# Patient Record
Sex: FEMALE | Race: OTHER | HISPANIC OR LATINO
[De-identification: names, ages, dates, MRNs, and addresses within clinical notes are randomized per-mention and may not be internally consistent; named-entity substitution may affect disease eponyms.]

---

## 2017-05-15 ENCOUNTER — APPOINTMENT (OUTPATIENT)
Dept: OPHTHALMOLOGY | Facility: CLINIC | Age: 72
End: 2017-05-15

## 2017-11-16 ENCOUNTER — APPOINTMENT (OUTPATIENT)
Dept: OPHTHALMOLOGY | Facility: CLINIC | Age: 72
End: 2017-11-16
Payer: MEDICARE

## 2017-11-16 DIAGNOSIS — H25.12 AGE-RELATED NUCLEAR CATARACT, LEFT EYE: ICD-10-CM

## 2017-11-16 PROCEDURE — 92012 INTRM OPH EXAM EST PATIENT: CPT

## 2017-11-16 PROCEDURE — 92083 EXTENDED VISUAL FIELD XM: CPT

## 2017-11-16 PROCEDURE — 92020 GONIOSCOPY: CPT

## 2018-05-17 ENCOUNTER — APPOINTMENT (OUTPATIENT)
Dept: OPHTHALMOLOGY | Facility: CLINIC | Age: 73
End: 2018-05-17
Payer: MEDICARE

## 2018-05-17 PROCEDURE — 92020 GONIOSCOPY: CPT

## 2018-05-17 PROCEDURE — 92014 COMPRE OPH EXAM EST PT 1/>: CPT

## 2018-11-15 ENCOUNTER — APPOINTMENT (OUTPATIENT)
Dept: OPHTHALMOLOGY | Facility: CLINIC | Age: 73
End: 2018-11-15
Payer: MEDICARE

## 2018-11-15 DIAGNOSIS — H40.039 ANATOMICAL NARROW ANGLE, UNSPECIFIED EYE: ICD-10-CM

## 2018-11-15 PROCEDURE — 92133 CPTRZD OPH DX IMG PST SGM ON: CPT

## 2018-11-15 PROCEDURE — 92014 COMPRE OPH EXAM EST PT 1/>: CPT

## 2018-11-15 PROCEDURE — 92083 EXTENDED VISUAL FIELD XM: CPT

## 2018-12-28 ENCOUNTER — OUTPATIENT (OUTPATIENT)
Dept: OUTPATIENT SERVICES | Facility: HOSPITAL | Age: 73
LOS: 1 days | End: 2018-12-28

## 2018-12-28 VITALS
SYSTOLIC BLOOD PRESSURE: 100 MMHG | RESPIRATION RATE: 16 BRPM | TEMPERATURE: 98 F | DIASTOLIC BLOOD PRESSURE: 60 MMHG | HEART RATE: 78 BPM | OXYGEN SATURATION: 98 % | HEIGHT: 59 IN | WEIGHT: 128.09 LBS

## 2018-12-28 DIAGNOSIS — D49.0 NEOPLASM OF UNSPECIFIED BEHAVIOR OF DIGESTIVE SYSTEM: ICD-10-CM

## 2018-12-28 DIAGNOSIS — H54.7 UNSPECIFIED VISUAL LOSS: Chronic | ICD-10-CM

## 2018-12-28 DIAGNOSIS — Z90.49 ACQUIRED ABSENCE OF OTHER SPECIFIED PARTS OF DIGESTIVE TRACT: Chronic | ICD-10-CM

## 2018-12-28 DIAGNOSIS — K38.9 DISEASE OF APPENDIX, UNSPECIFIED: ICD-10-CM

## 2018-12-28 DIAGNOSIS — Z98.890 OTHER SPECIFIED POSTPROCEDURAL STATES: Chronic | ICD-10-CM

## 2018-12-28 DIAGNOSIS — H40.9 UNSPECIFIED GLAUCOMA: Chronic | ICD-10-CM

## 2018-12-28 RX ORDER — SODIUM CHLORIDE 9 MG/ML
1000 INJECTION, SOLUTION INTRAVENOUS
Qty: 0 | Refills: 0 | Status: DISCONTINUED | OUTPATIENT
Start: 2019-01-04 | End: 2019-01-19

## 2018-12-28 NOTE — H&P PST ADULT - NEGATIVE ENMT SYMPTOMS
no recurrent cold sores/no sinus symptoms/no hearing difficulty/no nose bleeds/no vertigo/no nasal congestion/no nasal discharge/no throat pain/no dysphagia/no ear pain/no tinnitus

## 2018-12-28 NOTE — H&P PST ADULT - NSANTHOSAYNRD_GEN_A_CORE
No. YESI screening performed.  STOP BANG Legend: 0-2 = LOW Risk; 3-4 = INTERMEDIATE Risk; 5-8 = HIGH Risk/never tested

## 2018-12-28 NOTE — H&P PST ADULT - RS GEN PE MLT RESP DETAILS PC
no rhonchi/clear to auscultation bilaterally/respirations non-labored/no intercostal retractions/no rales/airway patent

## 2018-12-28 NOTE — H&P PST ADULT - ASSESSMENT
73 yrs old female who had colonoscopy in Oct 2018 that showed enlarged and inflamed appendix as per the pt. Pt presents for preop eval to have laparoscopic appendectomy on 1/4/19.

## 2018-12-28 NOTE — H&P PST ADULT - HISTORY OF PRESENT ILLNESS
73 yrs old female who had colonoscopy in Oct 2018 that showed enlarged and inflamed appendix as per the pt. Pt then had MRI that showed ? mass. Pt presents for preop eval to have laparoscopic appendectomy on 1/4/19.

## 2018-12-28 NOTE — H&P PST ADULT - PSH
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

## 2018-12-28 NOTE — H&P PST ADULT - PMH
Appendix disease Appendix disease    Blindness  right eye, pt wears prostheis on her right eye  Glaucoma  left eye Appendix disease    Blindness  right eye, pt wears prosthesis on her right eye  Glaucoma  left eye

## 2018-12-28 NOTE — H&P PST ADULT - VISION (WITH CORRECTIVE LENSES IF THE PATIENT USUALLY WEARS THEM):
Normal vision: sees adequately in most situations; can see medication labels, newsprint/uses eye glasses Partially impaired: cannot see medication labels or newsprint, but can see obstacles in path, and the surrounding layout; can count fingers at arm's length/uses eye glasses, right eye prosthesis, pt is blind in her right

## 2018-12-28 NOTE — H&P PST ADULT - PROBLEM SELECTOR PLAN 1
Scheduled to have laparoscopic appendectomy on 1/4/19.   Labs and EKG done by PCP and copy in chart.   Preop instructions provided to pt.   Famotidine and chlorhexidine scrubs provided to pt.  Pt had medical clearance - will obtain copy.

## 2018-12-30 PROBLEM — H54.7 UNSPECIFIED VISUAL LOSS: Chronic | Status: ACTIVE | Noted: 2018-12-28

## 2019-01-03 ENCOUNTER — TRANSCRIPTION ENCOUNTER (OUTPATIENT)
Age: 74
End: 2019-01-03

## 2019-01-03 NOTE — ASU PATIENT PROFILE, ADULT - VISION (WITH CORRECTIVE LENSES IF THE PATIENT USUALLY WEARS THEM):
Partially impaired: cannot see medication labels or newsprint, but can see obstacles in path, and the surrounding layout; can count fingers at arm's length/uses eye glasses, right eye prosthesis, pt is blind in her right

## 2019-01-03 NOTE — ASU PATIENT PROFILE, ADULT - PSH
delivery delivered  many yrs ago with tubal ligation  H/O abdominoplasty  many yrs ago  H/O colonoscopy    History of cholecystectomy  15 yrs ago

## 2019-01-04 ENCOUNTER — OUTPATIENT (OUTPATIENT)
Dept: OUTPATIENT SERVICES | Facility: HOSPITAL | Age: 74
LOS: 1 days | Discharge: HOME | End: 2019-01-04
Payer: MEDICARE

## 2019-01-04 ENCOUNTER — RESULT REVIEW (OUTPATIENT)
Age: 74
End: 2019-01-04

## 2019-01-04 VITALS
DIASTOLIC BLOOD PRESSURE: 60 MMHG | HEART RATE: 78 BPM | OXYGEN SATURATION: 98 % | WEIGHT: 128.09 LBS | RESPIRATION RATE: 16 BRPM | HEIGHT: 59 IN | SYSTOLIC BLOOD PRESSURE: 100 MMHG | TEMPERATURE: 98 F

## 2019-01-04 VITALS
OXYGEN SATURATION: 100 % | DIASTOLIC BLOOD PRESSURE: 75 MMHG | HEART RATE: 80 BPM | RESPIRATION RATE: 14 BRPM | SYSTOLIC BLOOD PRESSURE: 133 MMHG

## 2019-01-04 DIAGNOSIS — Z98.890 OTHER SPECIFIED POSTPROCEDURAL STATES: Chronic | ICD-10-CM

## 2019-01-04 DIAGNOSIS — D49.0 NEOPLASM OF UNSPECIFIED BEHAVIOR OF DIGESTIVE SYSTEM: ICD-10-CM

## 2019-01-04 DIAGNOSIS — Z90.49 ACQUIRED ABSENCE OF OTHER SPECIFIED PARTS OF DIGESTIVE TRACT: Chronic | ICD-10-CM

## 2019-01-04 PROCEDURE — 88305 TISSUE EXAM BY PATHOLOGIST: CPT | Mod: 26

## 2019-01-04 PROCEDURE — 88304 TISSUE EXAM BY PATHOLOGIST: CPT | Mod: 26

## 2019-01-04 RX ORDER — IBUPROFEN 200 MG
1 TABLET ORAL
Qty: 0 | Refills: 0 | COMMUNITY

## 2019-01-04 RX ADMIN — SODIUM CHLORIDE 30 MILLILITER(S): 9 INJECTION, SOLUTION INTRAVENOUS at 15:53

## 2019-01-04 NOTE — ASU DISCHARGE PLAN (ADULT/PEDIATRIC). - SPECIAL INSTRUCTIONS
FOLLOW-UP:  1. Please call to make a follow-up appointment within one week of discharge with Dr. Mcgregor (See below for office information to call for an appointment)   ACTIVITY: No heavy lifting or straining. Otherwise, you may return to your usual level of physical activity. If you are taking narcotic pain medication (such as Percocet), do NOT drive a car, operate machinery or make important decisions.  DIET: Return to your usual diet.  WOUND CARE: steri strips will fall off on their own  BATHING: Please do not submerge wound underwater. You may shower and/or sponge bathe.

## 2019-01-04 NOTE — BRIEF OPERATIVE NOTE - OPERATION/FINDINGS
Laparoscopic appendectomy. Appendix engorged; base of appendix w/cecum taken via EndoGIA 45 purple load stapler.

## 2019-01-04 NOTE — ASU PREOP CHECKLIST - 1.
Patient's right eye is blind and small, and she wears a prosthetic to make it look normal.  She sleeps in it and Dr. Rust is aware.  She can leave it in.

## 2019-01-04 NOTE — ASU DISCHARGE PLAN (ADULT/PEDIATRIC). - NURSING INSTRUCTIONS
You were given 1000mg IV Tylenol for pain management.  Please DO NOT take Tylenol, Vicodin or Percocet for the next 6 hours (until 8:15 pm).  DO NOT EXCEED 3000MG OF TYLENOL OVER 24 HOURS.

## 2019-01-04 NOTE — BRIEF OPERATIVE NOTE - PROCEDURE
<<-----Click on this checkbox to enter Procedure Laparoscopic appendectomy  01/04/2019    Active  [facility]

## 2019-01-04 NOTE — ASU DISCHARGE PLAN (ADULT/PEDIATRIC). - MEDICATION SUMMARY - MEDICATIONS TO TAKE
I will START or STAY ON the medications listed below when I get home from the hospital:    Percocet 5/325 oral tablet  -- 1 tab(s) by mouth every 6 hours, As Needed MDD:4 tabs per day   -- Caution federal law prohibits the transfer of this drug to any person other  than the person for whom it was prescribed.  May cause drowsiness.  Alcohol may intensify this effect.  Use care when operating dangerous machinery.  This prescription cannot be refilled.  This product contains acetaminophen.  Do not use  with any other product containing acetaminophen to prevent possible liver damage.  Using more of this medication than prescribed may cause serious breathing problems.    -- Indication: For post-operative pain control    Calcitrate  -- 1 tab(s) by mouth once a day, am  -- Indication: For Nutritional supplementation    Tobradex 0.3%-0.1% ophthalmic suspension  -- 2 drop(s) to each affected eye 4 times a day, last dose 12/28/18  -- Indication: For opthalmic solution    Probiotic Formula oral capsule  -- 1 cap(s) by mouth once a day, am  -- Indication: For probiotic    Centrum oral tablet  -- 1 tab(s) by mouth once a day, last dose 12/28/18  -- Indication: For Nutritional supplementation

## 2019-01-16 LAB — SURGICAL PATHOLOGY STUDY: SIGNIFICANT CHANGE UP

## 2019-01-23 PROBLEM — H40.9 UNSPECIFIED GLAUCOMA: Chronic | Status: ACTIVE | Noted: 2018-12-28

## 2019-01-29 ENCOUNTER — APPOINTMENT (OUTPATIENT)
Dept: COLORECTAL SURGERY | Facility: CLINIC | Age: 74
End: 2019-01-29

## 2019-02-08 ENCOUNTER — OUTPATIENT (OUTPATIENT)
Dept: OUTPATIENT SERVICES | Facility: HOSPITAL | Age: 74
LOS: 1 days | End: 2019-02-08
Payer: MEDICARE

## 2019-02-08 VITALS
WEIGHT: 125 LBS | HEIGHT: 59.5 IN | HEART RATE: 72 BPM | OXYGEN SATURATION: 99 % | TEMPERATURE: 98 F | RESPIRATION RATE: 16 BRPM | SYSTOLIC BLOOD PRESSURE: 120 MMHG | DIASTOLIC BLOOD PRESSURE: 78 MMHG

## 2019-02-08 DIAGNOSIS — Z90.49 ACQUIRED ABSENCE OF OTHER SPECIFIED PARTS OF DIGESTIVE TRACT: Chronic | ICD-10-CM

## 2019-02-08 DIAGNOSIS — Z98.890 OTHER SPECIFIED POSTPROCEDURAL STATES: Chronic | ICD-10-CM

## 2019-02-08 DIAGNOSIS — C18.1 MALIGNANT NEOPLASM OF APPENDIX: ICD-10-CM

## 2019-02-08 LAB
ALBUMIN SERPL ELPH-MCNC: 4.4 G/DL — SIGNIFICANT CHANGE UP (ref 3.3–5)
ALP SERPL-CCNC: 81 U/L — SIGNIFICANT CHANGE UP (ref 40–120)
ALT FLD-CCNC: 15 U/L — SIGNIFICANT CHANGE UP (ref 4–33)
ANION GAP SERPL CALC-SCNC: 13 MMO/L — SIGNIFICANT CHANGE UP (ref 7–14)
AST SERPL-CCNC: 20 U/L — SIGNIFICANT CHANGE UP (ref 4–32)
BILIRUB SERPL-MCNC: 0.5 MG/DL — SIGNIFICANT CHANGE UP (ref 0.2–1.2)
BLD GP AB SCN SERPL QL: NEGATIVE — SIGNIFICANT CHANGE UP
BUN SERPL-MCNC: 9 MG/DL — SIGNIFICANT CHANGE UP (ref 7–23)
CALCIUM SERPL-MCNC: 10.4 MG/DL — SIGNIFICANT CHANGE UP (ref 8.4–10.5)
CHLORIDE SERPL-SCNC: 101 MMOL/L — SIGNIFICANT CHANGE UP (ref 98–107)
CO2 SERPL-SCNC: 26 MMOL/L — SIGNIFICANT CHANGE UP (ref 22–31)
CREAT SERPL-MCNC: 0.68 MG/DL — SIGNIFICANT CHANGE UP (ref 0.5–1.3)
GLUCOSE SERPL-MCNC: 76 MG/DL — SIGNIFICANT CHANGE UP (ref 70–99)
HBA1C BLD-MCNC: 4.5 % — SIGNIFICANT CHANGE UP (ref 4–5.6)
HCT VFR BLD CALC: 39.2 % — SIGNIFICANT CHANGE UP (ref 34.5–45)
HGB BLD-MCNC: 12.9 G/DL — SIGNIFICANT CHANGE UP (ref 11.5–15.5)
MCHC RBC-ENTMCNC: 30.6 PG — SIGNIFICANT CHANGE UP (ref 27–34)
MCHC RBC-ENTMCNC: 32.9 % — SIGNIFICANT CHANGE UP (ref 32–36)
MCV RBC AUTO: 93.1 FL — SIGNIFICANT CHANGE UP (ref 80–100)
NRBC # FLD: 0 K/UL — LOW (ref 25–125)
PLATELET # BLD AUTO: 265 K/UL — SIGNIFICANT CHANGE UP (ref 150–400)
PMV BLD: 11.2 FL — SIGNIFICANT CHANGE UP (ref 7–13)
POTASSIUM SERPL-MCNC: 3.9 MMOL/L — SIGNIFICANT CHANGE UP (ref 3.5–5.3)
POTASSIUM SERPL-SCNC: 3.9 MMOL/L — SIGNIFICANT CHANGE UP (ref 3.5–5.3)
PROT SERPL-MCNC: 7.7 G/DL — SIGNIFICANT CHANGE UP (ref 6–8.3)
RBC # BLD: 4.21 M/UL — SIGNIFICANT CHANGE UP (ref 3.8–5.2)
RBC # FLD: 12 % — SIGNIFICANT CHANGE UP (ref 10.3–14.5)
RH IG SCN BLD-IMP: POSITIVE — SIGNIFICANT CHANGE UP
SODIUM SERPL-SCNC: 140 MMOL/L — SIGNIFICANT CHANGE UP (ref 135–145)
WBC # BLD: 7 K/UL — SIGNIFICANT CHANGE UP (ref 3.8–10.5)
WBC # FLD AUTO: 7 K/UL — SIGNIFICANT CHANGE UP (ref 3.8–10.5)

## 2019-02-08 PROCEDURE — 93010 ELECTROCARDIOGRAM REPORT: CPT

## 2019-02-08 RX ORDER — MULTIVIT-MIN/FERROUS GLUCONATE 9 MG/15 ML
1 LIQUID (ML) ORAL
Qty: 0 | Refills: 0 | COMMUNITY

## 2019-02-08 RX ORDER — TOBRAMYCIN AND DEXAMETHASONE 1; 3 MG/ML; MG/ML
2 SUSPENSION/ DROPS OPHTHALMIC
Qty: 0 | Refills: 0 | COMMUNITY

## 2019-02-08 RX ORDER — L.ACIDOPH/B.ANIMALIS/B.LONGUM 15B CELL
1 CAPSULE ORAL
Qty: 0 | Refills: 0 | COMMUNITY

## 2019-02-08 NOTE — H&P PST ADULT - NEUROLOGICAL DETAILS
responds to verbal commands/cranial nerves intact/normal strength/alert and oriented x 3/responds to pain/sensation intact

## 2019-02-08 NOTE — H&P PST ADULT - NEGATIVE ENMT SYMPTOMS
no tinnitus/no hearing difficulty/no throat pain/no dysphagia/no sinus symptoms/no vertigo/no nasal congestion/no ear pain/no nose bleeds

## 2019-02-08 NOTE — H&P PST ADULT - PMH
Appendix disease    Arthritis    Blindness  right eye, pt wears prosthesis on her right eye  Glaucoma  left eye  Malignant neoplasm of appendix

## 2019-02-08 NOTE — H&P PST ADULT - VISION (WITH CORRECTIVE LENSES IF THE PATIENT USUALLY WEARS THEM):
uses eye glasses, right eye prosthesis, pt is blind in her right eye/Partially impaired: cannot see medication labels or newsprint, but can see obstacles in path, and the surrounding layout; can count fingers at arm's length

## 2019-02-08 NOTE — H&P PST ADULT - NEGATIVE GASTROINTESTINAL SYMPTOMS
no constipation/no abdominal pain/no melena/no diarrhea/no nausea/no vomiting/no change in bowel habits

## 2019-02-08 NOTE — H&P PST ADULT - RS GEN PE MLT RESP DETAILS PC
airway patent/no rales/breath sounds equal/respirations non-labored/clear to auscultation bilaterally/no rhonchi/no wheezes

## 2019-02-08 NOTE — H&P PST ADULT - PROBLEM SELECTOR PLAN 1
Pt is scheduled for laparoscopic right hemicolectomy for 2/22/19. Pre-op instructions provided. Pepcid provided for GI prophylaxis. Chlorhexidine soap provided for skin prep. Pt stated understanding.     Pending medical evaluation due to advanced age and planned major surgery. Instructed patient to make an appt.

## 2019-02-08 NOTE — H&P PST ADULT - NEGATIVE NEUROLOGICAL SYMPTOMS
no difficulty walking/no weakness/no generalized seizures/no focal seizures/no vertigo/no headache/no transient paralysis/no tremors/no paresthesias/no syncope

## 2019-02-08 NOTE — H&P PST ADULT - HISTORY OF PRESENT ILLNESS
74 year old female presents to presurgical testing with diagnosis of malignant neoplasm of appendix scheduled for laparoscopic right hemicolectomy for 2/22/19. Pt is s/p laparoscopic appendectomy on 1/4/19, pathology positive for carcinoma. Pt denies abdominal pain, had two episodes of diarrhea after eating oatmeal, but otherwise, no changes in bowel habits.

## 2019-02-08 NOTE — H&P PST ADULT - PSH
delivery delivered  many yrs ago with tubal ligation  H/O abdominoplasty  many yrs ago  H/O colonoscopy    History of cholecystectomy  15 yrs ago  S/P laparoscopic appendectomy  19

## 2019-02-08 NOTE — H&P PST ADULT - NEGATIVE CARDIOVASCULAR SYMPTOMS
no chest pain/no paroxysmal nocturnal dyspnea/no dyspnea on exertion/no peripheral edema/no palpitations

## 2019-03-04 ENCOUNTER — TRANSCRIPTION ENCOUNTER (OUTPATIENT)
Age: 74
End: 2019-03-04

## 2019-03-05 ENCOUNTER — INPATIENT (INPATIENT)
Facility: HOSPITAL | Age: 74
LOS: 9 days | Discharge: HOME | End: 2019-03-15
Attending: SPECIALIST | Admitting: SPECIALIST
Payer: MEDICARE

## 2019-03-05 ENCOUNTER — RESULT REVIEW (OUTPATIENT)
Age: 74
End: 2019-03-05

## 2019-03-05 VITALS
HEIGHT: 59.5 IN | TEMPERATURE: 98 F | SYSTOLIC BLOOD PRESSURE: 124 MMHG | OXYGEN SATURATION: 98 % | DIASTOLIC BLOOD PRESSURE: 62 MMHG | HEART RATE: 77 BPM | RESPIRATION RATE: 16 BRPM | WEIGHT: 125 LBS

## 2019-03-05 DIAGNOSIS — Z90.49 ACQUIRED ABSENCE OF OTHER SPECIFIED PARTS OF DIGESTIVE TRACT: Chronic | ICD-10-CM

## 2019-03-05 DIAGNOSIS — Z98.890 OTHER SPECIFIED POSTPROCEDURAL STATES: Chronic | ICD-10-CM

## 2019-03-05 DIAGNOSIS — C18.1 MALIGNANT NEOPLASM OF APPENDIX: ICD-10-CM

## 2019-03-05 LAB
ANION GAP SERPL CALC-SCNC: 15 MMO/L — HIGH (ref 7–14)
BLD GP AB SCN SERPL QL: NEGATIVE — SIGNIFICANT CHANGE UP
BUN SERPL-MCNC: 7 MG/DL — SIGNIFICANT CHANGE UP (ref 7–23)
CALCIUM SERPL-MCNC: 8.5 MG/DL — SIGNIFICANT CHANGE UP (ref 8.4–10.5)
CHLORIDE SERPL-SCNC: 101 MMOL/L — SIGNIFICANT CHANGE UP (ref 98–107)
CO2 SERPL-SCNC: 23 MMOL/L — SIGNIFICANT CHANGE UP (ref 22–31)
CREAT SERPL-MCNC: 0.66 MG/DL — SIGNIFICANT CHANGE UP (ref 0.5–1.3)
GLUCOSE BLDC GLUCOMTR-MCNC: 92 MG/DL — SIGNIFICANT CHANGE UP (ref 70–99)
GLUCOSE SERPL-MCNC: 129 MG/DL — HIGH (ref 70–99)
HCT VFR BLD CALC: 35.5 % — SIGNIFICANT CHANGE UP (ref 34.5–45)
HGB BLD-MCNC: 11.6 G/DL — SIGNIFICANT CHANGE UP (ref 11.5–15.5)
MAGNESIUM SERPL-MCNC: 1.3 MG/DL — LOW (ref 1.6–2.6)
MCHC RBC-ENTMCNC: 30.2 PG — SIGNIFICANT CHANGE UP (ref 27–34)
MCHC RBC-ENTMCNC: 32.7 % — SIGNIFICANT CHANGE UP (ref 32–36)
MCV RBC AUTO: 92.4 FL — SIGNIFICANT CHANGE UP (ref 80–100)
NRBC # FLD: 0 K/UL — LOW (ref 25–125)
PHOSPHATE SERPL-MCNC: 3.4 MG/DL — SIGNIFICANT CHANGE UP (ref 2.5–4.5)
PLATELET # BLD AUTO: 210 K/UL — SIGNIFICANT CHANGE UP (ref 150–400)
PMV BLD: 10.1 FL — SIGNIFICANT CHANGE UP (ref 7–13)
POTASSIUM SERPL-MCNC: 3.7 MMOL/L — SIGNIFICANT CHANGE UP (ref 3.5–5.3)
POTASSIUM SERPL-SCNC: 3.7 MMOL/L — SIGNIFICANT CHANGE UP (ref 3.5–5.3)
RBC # BLD: 3.84 M/UL — SIGNIFICANT CHANGE UP (ref 3.8–5.2)
RBC # FLD: 11.9 % — SIGNIFICANT CHANGE UP (ref 10.3–14.5)
RH IG SCN BLD-IMP: POSITIVE — SIGNIFICANT CHANGE UP
SODIUM SERPL-SCNC: 139 MMOL/L — SIGNIFICANT CHANGE UP (ref 135–145)
WBC # BLD: 10.81 K/UL — HIGH (ref 3.8–10.5)
WBC # FLD AUTO: 10.81 K/UL — HIGH (ref 3.8–10.5)

## 2019-03-05 PROCEDURE — 88309 TISSUE EXAM BY PATHOLOGIST: CPT | Mod: 26

## 2019-03-05 RX ORDER — ACETAMINOPHEN 500 MG
1000 TABLET ORAL ONCE
Qty: 0 | Refills: 0 | Status: DISCONTINUED | OUTPATIENT
Start: 2019-03-05 | End: 2019-03-05

## 2019-03-05 RX ORDER — HYDROMORPHONE HYDROCHLORIDE 2 MG/ML
0.5 INJECTION INTRAMUSCULAR; INTRAVENOUS; SUBCUTANEOUS
Qty: 0 | Refills: 0 | Status: DISCONTINUED | OUTPATIENT
Start: 2019-03-05 | End: 2019-03-06

## 2019-03-05 RX ORDER — ONDANSETRON 8 MG/1
4 TABLET, FILM COATED ORAL EVERY 6 HOURS
Qty: 0 | Refills: 0 | Status: DISCONTINUED | OUTPATIENT
Start: 2019-03-05 | End: 2019-03-06

## 2019-03-05 RX ORDER — SODIUM CHLORIDE 9 MG/ML
1000 INJECTION, SOLUTION INTRAVENOUS
Qty: 0 | Refills: 0 | Status: DISCONTINUED | OUTPATIENT
Start: 2019-03-05 | End: 2019-03-06

## 2019-03-05 RX ORDER — ACETAMINOPHEN 500 MG
1000 TABLET ORAL ONCE
Qty: 0 | Refills: 0 | Status: COMPLETED | OUTPATIENT
Start: 2019-03-06 | End: 2019-03-06

## 2019-03-05 RX ORDER — MAGNESIUM SULFATE 500 MG/ML
2 VIAL (ML) INJECTION ONCE
Qty: 0 | Refills: 0 | Status: COMPLETED | OUTPATIENT
Start: 2019-03-05 | End: 2019-03-05

## 2019-03-05 RX ORDER — NALOXONE HYDROCHLORIDE 4 MG/.1ML
0.1 SPRAY NASAL
Qty: 0 | Refills: 0 | Status: DISCONTINUED | OUTPATIENT
Start: 2019-03-05 | End: 2019-03-06

## 2019-03-05 RX ORDER — ENOXAPARIN SODIUM 100 MG/ML
40 INJECTION SUBCUTANEOUS DAILY
Qty: 0 | Refills: 0 | Status: DISCONTINUED | OUTPATIENT
Start: 2019-03-05 | End: 2019-03-15

## 2019-03-05 RX ORDER — ONDANSETRON 8 MG/1
4 TABLET, FILM COATED ORAL ONCE
Qty: 0 | Refills: 0 | Status: DISCONTINUED | OUTPATIENT
Start: 2019-03-05 | End: 2019-03-05

## 2019-03-05 RX ORDER — SODIUM CHLORIDE 9 MG/ML
1000 INJECTION, SOLUTION INTRAVENOUS
Qty: 0 | Refills: 0 | Status: DISCONTINUED | OUTPATIENT
Start: 2019-03-05 | End: 2019-03-05

## 2019-03-05 RX ORDER — HYDROMORPHONE HYDROCHLORIDE 2 MG/ML
30 INJECTION INTRAMUSCULAR; INTRAVENOUS; SUBCUTANEOUS
Qty: 0 | Refills: 0 | Status: DISCONTINUED | OUTPATIENT
Start: 2019-03-05 | End: 2019-03-06

## 2019-03-05 RX ORDER — HYDROMORPHONE HYDROCHLORIDE 2 MG/ML
0.5 INJECTION INTRAMUSCULAR; INTRAVENOUS; SUBCUTANEOUS
Qty: 0 | Refills: 0 | Status: DISCONTINUED | OUTPATIENT
Start: 2019-03-05 | End: 2019-03-05

## 2019-03-05 RX ORDER — ACETAMINOPHEN 500 MG
1000 TABLET ORAL ONCE
Qty: 0 | Refills: 0 | Status: COMPLETED | OUTPATIENT
Start: 2019-03-05 | End: 2019-03-05

## 2019-03-05 RX ORDER — DIPHENHYDRAMINE HCL 50 MG
12.5 CAPSULE ORAL EVERY 4 HOURS
Qty: 0 | Refills: 0 | Status: DISCONTINUED | OUTPATIENT
Start: 2019-03-05 | End: 2019-03-06

## 2019-03-05 RX ADMIN — HYDROMORPHONE HYDROCHLORIDE 30 MILLILITER(S): 2 INJECTION INTRAMUSCULAR; INTRAVENOUS; SUBCUTANEOUS at 16:00

## 2019-03-05 RX ADMIN — ENOXAPARIN SODIUM 40 MILLIGRAM(S): 100 INJECTION SUBCUTANEOUS at 23:53

## 2019-03-05 RX ADMIN — HYDROMORPHONE HYDROCHLORIDE 30 MILLILITER(S): 2 INJECTION INTRAMUSCULAR; INTRAVENOUS; SUBCUTANEOUS at 20:26

## 2019-03-05 RX ADMIN — Medication 50 GRAM(S): at 16:42

## 2019-03-05 RX ADMIN — Medication 400 MILLIGRAM(S): at 23:57

## 2019-03-05 NOTE — CHART NOTE - NSCHARTNOTEFT_GEN_A_CORE
B TEAM SURGERY POST-OPERATIVE NOTE    Subjective:  Patient is s/p laparoscopic right hemicolectomy for appendicular mucocele. Patient tolerated the procedure well and was transferred to PACU and then the floor without any issues. She is feeling well and denies any abdominal pain at this time, she has pressed the PCA pump button once. She has been tolerating sips of water with out any nausea or vomiting. She denies chest pain, lightheadedness or SOB. She has not been out of bed.     Objective:  Vital Signs Last 24 Hrs  T(C): 36.5 (05 Mar 2019 17:57), Max: 36.7 (05 Mar 2019 09:36)  T(F): 97.7 (05 Mar 2019 17:57), Max: 98.1 (05 Mar 2019 09:36)  HR: 88 (05 Mar 2019 17:57) (65 - 93)  BP: 130/70 (05 Mar 2019 17:57) (96/53 - 138/64)  BP(mean): 67 (05 Mar 2019 17:00) (61 - 97)  RR: 16 (05 Mar 2019 17:57) (8 - 16)  SpO2: 96% (05 Mar 2019 17:57) (96% - 100%)  I&O's Detail    05 Mar 2019 07:01  -  05 Mar 2019 19:02  --------------------------------------------------------  IN:    IV PiggyBack: 50 mL    lactated ringers.: 250 mL    Oral Fluid: 30 mL  Total IN: 330 mL    OUT:    Indwelling Catheter - Urethral: 575 mL  Total OUT: 575 mL    Total NET: -245 mL    enoxaparin Injectable 40    Physical Exam:  General: NAD, resting comfortably in bed  Pulmonary: Nonlabored breathing, no respiratory distress on RA  Cardiovascular: pulse is regular  Abdominal: soft, mildly distended, nontender throughout abdomen, even around incisions, midline incision dressing in place with scant s/s staining, lap incision dressings x4 c/d/i  Extremities: WWP  : cortes in place draining clear urine      LABS:                        11.6   10.81 )-----------( 210      ( 05 Mar 2019 14:55 )             35.5     03-05    139  |  101  |  7   ----------------------------<  129<H>  3.7   |  23  |  0.66    Ca    8.5      05 Mar 2019 14:55  Phos  3.4     03-05  Mg     1.3     03-05    CAPILLARY BLOOD GLUCOSE    POCT Blood Glucose.: 92 mg/dL (05 Mar 2019 10:02)    Assessment:  74y Female, now several hours post-op from a laparoscopic right hemicolectomy, recovering well.    Plan:  - CLD with IVF  - Pain control as needed  - LVX for DVT ppx  - OOB and ambulating as tolerated  - F/u AM labs  - Continue cortes until tomorrow

## 2019-03-05 NOTE — BRIEF OPERATIVE NOTE - OPERATION/FINDINGS
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

## 2019-03-05 NOTE — BRIEF OPERATIVE NOTE - PROCEDURE
<<-----Click on this checkbox to enter Procedure Right hemicolectomy  03/05/2019  laparoscopic assisted  Active  [id number]

## 2019-03-06 DIAGNOSIS — Z98.890 OTHER SPECIFIED POSTPROCEDURAL STATES: ICD-10-CM

## 2019-03-06 LAB
ANION GAP SERPL CALC-SCNC: 13 MMO/L — SIGNIFICANT CHANGE UP (ref 7–14)
BUN SERPL-MCNC: 6 MG/DL — LOW (ref 7–23)
CALCIUM SERPL-MCNC: 8.8 MG/DL — SIGNIFICANT CHANGE UP (ref 8.4–10.5)
CHLORIDE SERPL-SCNC: 101 MMOL/L — SIGNIFICANT CHANGE UP (ref 98–107)
CO2 SERPL-SCNC: 23 MMOL/L — SIGNIFICANT CHANGE UP (ref 22–31)
CREAT SERPL-MCNC: 0.59 MG/DL — SIGNIFICANT CHANGE UP (ref 0.5–1.3)
GLUCOSE SERPL-MCNC: 118 MG/DL — HIGH (ref 70–99)
HCT VFR BLD CALC: 37 % — SIGNIFICANT CHANGE UP (ref 34.5–45)
HGB BLD-MCNC: 12.1 G/DL — SIGNIFICANT CHANGE UP (ref 11.5–15.5)
MAGNESIUM SERPL-MCNC: 1.8 MG/DL — SIGNIFICANT CHANGE UP (ref 1.6–2.6)
MCHC RBC-ENTMCNC: 30.1 PG — SIGNIFICANT CHANGE UP (ref 27–34)
MCHC RBC-ENTMCNC: 32.7 % — SIGNIFICANT CHANGE UP (ref 32–36)
MCV RBC AUTO: 92 FL — SIGNIFICANT CHANGE UP (ref 80–100)
NRBC # FLD: 0 K/UL — LOW (ref 25–125)
PHOSPHATE SERPL-MCNC: 3.3 MG/DL — SIGNIFICANT CHANGE UP (ref 2.5–4.5)
PLATELET # BLD AUTO: 248 K/UL — SIGNIFICANT CHANGE UP (ref 150–400)
PMV BLD: 10.7 FL — SIGNIFICANT CHANGE UP (ref 7–13)
POTASSIUM SERPL-MCNC: 4 MMOL/L — SIGNIFICANT CHANGE UP (ref 3.5–5.3)
POTASSIUM SERPL-SCNC: 4 MMOL/L — SIGNIFICANT CHANGE UP (ref 3.5–5.3)
RBC # BLD: 4.02 M/UL — SIGNIFICANT CHANGE UP (ref 3.8–5.2)
RBC # FLD: 11.8 % — SIGNIFICANT CHANGE UP (ref 10.3–14.5)
SODIUM SERPL-SCNC: 137 MMOL/L — SIGNIFICANT CHANGE UP (ref 135–145)
WBC # BLD: 17.27 K/UL — HIGH (ref 3.8–10.5)
WBC # FLD AUTO: 17.27 K/UL — HIGH (ref 3.8–10.5)

## 2019-03-06 PROCEDURE — 93010 ELECTROCARDIOGRAM REPORT: CPT

## 2019-03-06 RX ORDER — SODIUM CHLORIDE 9 MG/ML
1000 INJECTION, SOLUTION INTRAVENOUS
Qty: 0 | Refills: 0 | Status: DISCONTINUED | OUTPATIENT
Start: 2019-03-06 | End: 2019-03-07

## 2019-03-06 RX ORDER — OXYCODONE HYDROCHLORIDE 5 MG/1
5 TABLET ORAL
Qty: 0 | Refills: 0 | Status: DISCONTINUED | OUTPATIENT
Start: 2019-03-06 | End: 2019-03-08

## 2019-03-06 RX ORDER — OXYCODONE HYDROCHLORIDE 5 MG/1
10 TABLET ORAL
Qty: 0 | Refills: 0 | Status: DISCONTINUED | OUTPATIENT
Start: 2019-03-06 | End: 2019-03-08

## 2019-03-06 RX ORDER — ACETAMINOPHEN 500 MG
650 TABLET ORAL EVERY 6 HOURS
Qty: 0 | Refills: 0 | Status: DISCONTINUED | OUTPATIENT
Start: 2019-03-06 | End: 2019-03-08

## 2019-03-06 RX ORDER — MAGNESIUM SULFATE 500 MG/ML
1 VIAL (ML) INJECTION ONCE
Qty: 0 | Refills: 0 | Status: COMPLETED | OUTPATIENT
Start: 2019-03-06 | End: 2019-03-06

## 2019-03-06 RX ADMIN — ENOXAPARIN SODIUM 40 MILLIGRAM(S): 100 INJECTION SUBCUTANEOUS at 11:31

## 2019-03-06 RX ADMIN — HYDROMORPHONE HYDROCHLORIDE 30 MILLILITER(S): 2 INJECTION INTRAMUSCULAR; INTRAVENOUS; SUBCUTANEOUS at 08:33

## 2019-03-06 RX ADMIN — Medication 400 MILLIGRAM(S): at 07:27

## 2019-03-06 RX ADMIN — Medication 650 MILLIGRAM(S): at 19:35

## 2019-03-06 RX ADMIN — Medication 400 MILLIGRAM(S): at 11:30

## 2019-03-06 RX ADMIN — Medication 100 GRAM(S): at 19:35

## 2019-03-06 RX ADMIN — OXYCODONE HYDROCHLORIDE 5 MILLIGRAM(S): 5 TABLET ORAL at 16:45

## 2019-03-06 NOTE — PROGRESS NOTE ADULT - SUBJECTIVE AND OBJECTIVE BOX
POD#1      Subjective: I feel well. I walked today.  I had some liquid to drink. I passed some felicity and some mucus from my rectum'    Objective:   Vital Signs Last 24 Hrs  T(C): 37.1 (06 Mar 2019 21:08), Max: 37.1 (06 Mar 2019 21:08)  T(F): 98.8 (06 Mar 2019 21:08), Max: 98.8 (06 Mar 2019 21:08)  HR: 95 (06 Mar 2019 21:08) (81 - 104)  BP: 114/63 (06 Mar 2019 21:08) (114/63 - 126/72)  BP(mean): --  RR: 18 (06 Mar 2019 21:08) (18 - 18)  SpO2: 94% (06 Mar 2019 21:08) (92% - 100%)    Daily     Daily     Heent: N/C, AT PER no scleral icterus, No JVD  Pul: clear  Cor: RRR  Abdomen: soft, non distended, normal BS. Wound - clean  Extremities: without edema, motor/sensory intact    I&O's Detail    05 Mar 2019 07:01  -  06 Mar 2019 07:00  --------------------------------------------------------  IN:    IV PiggyBack: 50 mL    lactated ringers.: 550 mL    Oral Fluid: 30 mL  Total IN: 630 mL    OUT:    Indwelling Catheter - Urethral: 1175 mL  Total OUT: 1175 mL    Total NET: -545 mL      06 Mar 2019 07:01  -  06 Mar 2019 22:11  --------------------------------------------------------  IN:    dextrose 5% + sodium chloride 0.45%.: 550 mL    Oral Fluid: 536 mL  Total IN: 1086 mL    OUT:    Voided: 1900 mL  Total OUT: 1900 mL    Total NET: -814 mL          MEDICATIONS  (STANDING):  acetaminophen   Tablet .. 650 milliGRAM(s) Oral every 6 hours  dextrose 5% + sodium chloride 0.45%. 1000 milliLiter(s) (50 mL/Hr) IV Continuous <Continuous>  enoxaparin Injectable 40 milliGRAM(s) SubCutaneous daily    MEDICATIONS  (PRN):  oxyCODONE    IR 5 milliGRAM(s) Oral every 3 hours PRN Moderate Pain (4 - 6)  oxyCODONE    IR 10 milliGRAM(s) Oral every 3 hours PRN Severe Pain (7 - 10)                            12.1   17.27 )-----------( 248      ( 06 Mar 2019 05:45 )             37.0     03-06    137  |  101  |  6<L>  ----------------------------<  118<H>  4.0   |  23  |  0.59    Ca    8.8      06 Mar 2019 05:45  Phos  3.3     03-06  Mg     1.8     03-06          Radiologic Studies:

## 2019-03-06 NOTE — PROGRESS NOTE ADULT - SUBJECTIVE AND OBJECTIVE BOX
A Team Surgery    SUBJECTIVE: Pt seen and examined at bedside.  Pt doing well.  Fritz small amount of water, on CLD.  Denies N/V.  +/- GI function.  Pain control adequate.  No acute complaints at this time.    OBJECTIVE: T(C): 36.9 (03-06-19 @ 10:35), Max: 36.9 (03-06-19 @ 10:35)  HR: 83 (03-06-19 @ 10:35) (65 - 93)  BP: 120/63 (03-06-19 @ 10:35) (96/53 - 138/64)  RR: 18 (03-06-19 @ 10:35) (8 - 18)  SpO2: 95% (03-06-19 @ 10:35) (94% - 100%)  Wt(kg): --  I&O's Summary    05 Mar 2019 07:01  -  06 Mar 2019 07:00  --------------------------------------------------------  IN: 630 mL / OUT: 1175 mL / NET: -545 mL      I&O's Detail    05 Mar 2019 07:01  -  06 Mar 2019 07:00  --------------------------------------------------------  IN:    IV PiggyBack: 50 mL    lactated ringers.: 550 mL    Oral Fluid: 30 mL  Total IN: 630 mL    OUT:    Indwelling Catheter - Urethral: 1175 mL  Total OUT: 1175 mL    Total NET: -545 mL        General: NAD  Abdomen: soft, NT, ND, dressing c/d/i    MEDICATIONS  (STANDING):  acetaminophen   Tablet .. 650 milliGRAM(s) Oral every 6 hours  acetaminophen  IVPB .. 1000 milliGRAM(s) IV Intermittent once  enoxaparin Injectable 40 milliGRAM(s) SubCutaneous daily  lactated ringers. 1000 milliLiter(s) (100 mL/Hr) IV Continuous <Continuous>  magnesium sulfate  IVPB 1 Gram(s) IV Intermittent once    MEDICATIONS  (PRN):  oxyCODONE    IR 5 milliGRAM(s) Oral every 3 hours PRN Moderate Pain (4 - 6)  oxyCODONE    IR 10 milliGRAM(s) Oral every 3 hours PRN Severe Pain (7 - 10)      LABS:                        12.1   17.27 )-----------( 248      ( 06 Mar 2019 05:45 )             37.0     03-06    137  |  101  |  6<L>  ----------------------------<  118<H>  4.0   |  23  |  0.59    Ca    8.8      06 Mar 2019 05:45  Phos  3.3     03-06  Mg     1.8     03-06            RADIOLOGY & ADDITIONAL STUDIES:

## 2019-03-06 NOTE — PROGRESS NOTE ADULT - SUBJECTIVE AND OBJECTIVE BOX
ANESTHESIA POSTOP CHECK    74y Female POSTOP DAY 1 S/P     Vital Signs Last 24 Hrs  T(C): 36.9 (06 Mar 2019 10:35), Max: 36.9 (06 Mar 2019 10:35)  T(F): 98.4 (06 Mar 2019 10:35), Max: 98.4 (06 Mar 2019 10:35)  HR: 83 (06 Mar 2019 10:35) (68 - 93)  BP: 120/63 (06 Mar 2019 10:35) (96/53 - 130/70)  BP(mean): 67 (05 Mar 2019 17:00) (64 - 67)  RR: 18 (06 Mar 2019 10:35) (10 - 18)  SpO2: 95% (06 Mar 2019 10:35) (94% - 100%)  I&O's Summary    05 Mar 2019 07:01  -  06 Mar 2019 07:00  --------------------------------------------------------  IN: 630 mL / OUT: 1175 mL / NET: -545 mL    06 Mar 2019 07:01  -  06 Mar 2019 16:21  --------------------------------------------------------  IN: 518 mL / OUT: 400 mL / NET: 118 mL        [X ] NO APPARENT ANESTHESIA COMPLICATIONS      Comments:

## 2019-03-06 NOTE — PROGRESS NOTE ADULT - SUBJECTIVE AND OBJECTIVE BOX
Anesthesia Pain Management Service    SUBJECTIVE: Pt now off IV PCA without problems reported.    Therapy:	  [ X] IV PCA	   [ ] Epidural           [ ] s/p Spinal Opoid              [ ] Postpartum infusion	  [ ] Patient controlled regional anesthesia (PCRA)    [ ] prn Analgesics    Allergies    No Known Allergies    Intolerances      MEDICATIONS  (STANDING):  acetaminophen   Tablet .. 650 milliGRAM(s) Oral every 6 hours  dextrose 5% + sodium chloride 0.45%. 1000 milliLiter(s) (50 mL/Hr) IV Continuous <Continuous>  enoxaparin Injectable 40 milliGRAM(s) SubCutaneous daily  magnesium sulfate  IVPB 1 Gram(s) IV Intermittent once    MEDICATIONS  (PRN):  oxyCODONE    IR 5 milliGRAM(s) Oral every 3 hours PRN Moderate Pain (4 - 6)  oxyCODONE    IR 10 milliGRAM(s) Oral every 3 hours PRN Severe Pain (7 - 10)      OBJECTIVE:   [X] No new signs     [ ] Other:    Side Effects:  [X ] None			[ ] Other:    Assessment of Catheter Site:		[ ] Intact		[ ] Other:    ASSESSMENT/PLAN  [ ] Continue current therapy    [X ] Therapy changed to:    [ ] IV PCA       [ ] Epidural     [ X] prn Analgesics     Comments: PRN Oral/IV opioids and/or non-opioid adjuvant analgesics to be used at this point.    Progress Note written now but Patient was seen earlier.

## 2019-03-06 NOTE — PROGRESS NOTE ADULT - ASSESSMENT
Assessment/Plan: 74yFemale s/p laparoscopic assisted right hemicolectomy  -PRN pain control with IV tylenol and PRN oxy  -IS/OOB/amb, PT  -cont CLD, f/u further GI function  -trend WBC  -remove cortes-->DTV in 8hrs  -strict I&O  -VTE ppx

## 2019-03-06 NOTE — PROGRESS NOTE ADULT - SUBJECTIVE AND OBJECTIVE BOX
Anesthesia Pain Management Service    SUBJECTIVE: Patient is doing well with IV PCA and no significant problems reported.    Pain Scale Score	At rest: _2__ 	With Activity: ___ 	[X ] Refer to charted pain scores    THERAPY:    [ ] IV PCA Morphine		[ ] 5 mg/mL	[ ] 1 mg/mL  [X ] IV PCA Hydromorphone	[ ] 5 mg/mL	[X ] 1 mg/mL  [ ] IV PCA Fentanyl		[ ] 50 micrograms/mL    Demand dose __0.2_ lockout __6_ (minutes) Continuous Rate _0__ Total: __1.2_   mg used (in past 24 hrs)      MEDICATIONS  (STANDING):  acetaminophen   Tablet .. 650 milliGRAM(s) Oral every 6 hours  acetaminophen  IVPB .. 1000 milliGRAM(s) IV Intermittent once  enoxaparin Injectable 40 milliGRAM(s) SubCutaneous daily  lactated ringers. 1000 milliLiter(s) (100 mL/Hr) IV Continuous <Continuous>  magnesium sulfate  IVPB 1 Gram(s) IV Intermittent once    MEDICATIONS  (PRN):  oxyCODONE    IR 5 milliGRAM(s) Oral every 3 hours PRN Moderate Pain (4 - 6)  oxyCODONE    IR 10 milliGRAM(s) Oral every 3 hours PRN Severe Pain (7 - 10)      OBJECTIVE: sitting in chair     Sedation Score:	[ X] Alert	[ ] Drowsy 	[ ] Arousable	[ ] Asleep	[ ] Unresponsive    Side Effects:	[X ] None	[ ] Nausea	[ ] Vomiting	[ ] Pruritus  		[ ] Other:    Vital Signs Last 24 Hrs  T(C): 36.9 (06 Mar 2019 10:35), Max: 36.9 (06 Mar 2019 10:35)  T(F): 98.4 (06 Mar 2019 10:35), Max: 98.4 (06 Mar 2019 10:35)  HR: 83 (06 Mar 2019 10:35) (65 - 93)  BP: 120/63 (06 Mar 2019 10:35) (96/53 - 138/64)  BP(mean): 67 (05 Mar 2019 17:00) (61 - 97)  RR: 18 (06 Mar 2019 10:35) (8 - 18)  SpO2: 95% (06 Mar 2019 10:35) (94% - 100%)    ASSESSMENT/ PLAN    Therapy to  be:	[ ] Continue   [ X] Discontinued   [X ] Change to prn Analgesics    Documentation and Verification of current medications:   [X] Done	[ ] Not done, not elligible    Comments: PRN Oral/IV opioids and/or Adjuvant medication to be ordered at this point.

## 2019-03-07 DIAGNOSIS — M19.90 UNSPECIFIED OSTEOARTHRITIS, UNSPECIFIED SITE: ICD-10-CM

## 2019-03-07 LAB
ANION GAP SERPL CALC-SCNC: 12 MMO/L — SIGNIFICANT CHANGE UP (ref 7–14)
BUN SERPL-MCNC: 3 MG/DL — LOW (ref 7–23)
CALCIUM SERPL-MCNC: 8.7 MG/DL — SIGNIFICANT CHANGE UP (ref 8.4–10.5)
CHLORIDE SERPL-SCNC: 102 MMOL/L — SIGNIFICANT CHANGE UP (ref 98–107)
CO2 SERPL-SCNC: 25 MMOL/L — SIGNIFICANT CHANGE UP (ref 22–31)
CREAT SERPL-MCNC: 0.54 MG/DL — SIGNIFICANT CHANGE UP (ref 0.5–1.3)
GLUCOSE SERPL-MCNC: 113 MG/DL — HIGH (ref 70–99)
HCT VFR BLD CALC: 35.7 % — SIGNIFICANT CHANGE UP (ref 34.5–45)
HGB BLD-MCNC: 11.6 G/DL — SIGNIFICANT CHANGE UP (ref 11.5–15.5)
MAGNESIUM SERPL-MCNC: 1.8 MG/DL — SIGNIFICANT CHANGE UP (ref 1.6–2.6)
MCHC RBC-ENTMCNC: 30.4 PG — SIGNIFICANT CHANGE UP (ref 27–34)
MCHC RBC-ENTMCNC: 32.5 % — SIGNIFICANT CHANGE UP (ref 32–36)
MCV RBC AUTO: 93.7 FL — SIGNIFICANT CHANGE UP (ref 80–100)
NRBC # FLD: 0 K/UL — LOW (ref 25–125)
PHOSPHATE SERPL-MCNC: 1.2 MG/DL — LOW (ref 2.5–4.5)
PLATELET # BLD AUTO: 238 K/UL — SIGNIFICANT CHANGE UP (ref 150–400)
PMV BLD: 11.3 FL — SIGNIFICANT CHANGE UP (ref 7–13)
POTASSIUM SERPL-MCNC: 3.4 MMOL/L — LOW (ref 3.5–5.3)
POTASSIUM SERPL-SCNC: 3.4 MMOL/L — LOW (ref 3.5–5.3)
RBC # BLD: 3.81 M/UL — SIGNIFICANT CHANGE UP (ref 3.8–5.2)
RBC # FLD: 12.1 % — SIGNIFICANT CHANGE UP (ref 10.3–14.5)
SODIUM SERPL-SCNC: 139 MMOL/L — SIGNIFICANT CHANGE UP (ref 135–145)
WBC # BLD: 11.93 K/UL — HIGH (ref 3.8–10.5)
WBC # FLD AUTO: 11.93 K/UL — HIGH (ref 3.8–10.5)

## 2019-03-07 RX ORDER — ONDANSETRON 8 MG/1
4 TABLET, FILM COATED ORAL ONCE
Qty: 0 | Refills: 0 | Status: COMPLETED | OUTPATIENT
Start: 2019-03-07 | End: 2019-03-07

## 2019-03-07 RX ORDER — DIPHENHYDRAMINE HCL 50 MG
50 CAPSULE ORAL ONCE
Qty: 0 | Refills: 0 | Status: COMPLETED | OUTPATIENT
Start: 2019-03-07 | End: 2019-03-07

## 2019-03-07 RX ORDER — POTASSIUM PHOSPHATE, MONOBASIC POTASSIUM PHOSPHATE, DIBASIC 236; 224 MG/ML; MG/ML
15 INJECTION, SOLUTION INTRAVENOUS ONCE
Qty: 0 | Refills: 0 | Status: COMPLETED | OUTPATIENT
Start: 2019-03-07 | End: 2019-03-07

## 2019-03-07 RX ADMIN — Medication 650 MILLIGRAM(S): at 17:36

## 2019-03-07 RX ADMIN — ENOXAPARIN SODIUM 40 MILLIGRAM(S): 100 INJECTION SUBCUTANEOUS at 11:45

## 2019-03-07 RX ADMIN — POTASSIUM PHOSPHATE, MONOBASIC POTASSIUM PHOSPHATE, DIBASIC 62.5 MILLIMOLE(S): 236; 224 INJECTION, SOLUTION INTRAVENOUS at 08:56

## 2019-03-07 RX ADMIN — Medication 650 MILLIGRAM(S): at 12:15

## 2019-03-07 RX ADMIN — Medication 650 MILLIGRAM(S): at 07:13

## 2019-03-07 RX ADMIN — Medication 650 MILLIGRAM(S): at 11:46

## 2019-03-07 RX ADMIN — Medication 650 MILLIGRAM(S): at 00:52

## 2019-03-07 RX ADMIN — ONDANSETRON 4 MILLIGRAM(S): 8 TABLET, FILM COATED ORAL at 07:13

## 2019-03-07 RX ADMIN — Medication 650 MILLIGRAM(S): at 01:30

## 2019-03-07 RX ADMIN — ONDANSETRON 4 MILLIGRAM(S): 8 TABLET, FILM COATED ORAL at 23:18

## 2019-03-07 NOTE — PROGRESS NOTE ADULT - SUBJECTIVE AND OBJECTIVE BOX
POD# 2      Subjective: I was feeling nauseated yesterday. I am still passing flatus.    Objective:   Vital Signs Last 24 Hrs  T(C): 37.3 (07 Mar 2019 10:44), Max: 37.3 (07 Mar 2019 10:44)  T(F): 99.1 (07 Mar 2019 10:44), Max: 99.1 (07 Mar 2019 10:44)  HR: 85 (07 Mar 2019 10:44) (84 - 104)  BP: 147/71 (07 Mar 2019 10:44) (114/63 - 148/80)  BP(mean): --  RR: 18 (07 Mar 2019 10:44) (16 - 18)  SpO2: 96% (07 Mar 2019 10:44) (92% - 100%)    Daily     Daily     Heent: N/C, AT PER no scleral icterus, No JVD  Pul: clear  Cor: RRR  Abdomen: soft, normal BS. Wound - clean  Extremities: without edema, motor/sensory intact    I&O's Detail    06 Mar 2019 07:01  -  07 Mar 2019 07:00  --------------------------------------------------------  IN:    dextrose 5% + sodium chloride 0.45%.: 750 mL    Oral Fluid: 536 mL  Total IN: 1286 mL    OUT:    Voided: 2700 mL  Total OUT: 2700 mL    Total NET: -1414 mL          MEDICATIONS  (STANDING):  acetaminophen   Tablet .. 650 milliGRAM(s) Oral every 6 hours  enoxaparin Injectable 40 milliGRAM(s) SubCutaneous daily    MEDICATIONS  (PRN):  oxyCODONE    IR 5 milliGRAM(s) Oral every 3 hours PRN Moderate Pain (4 - 6)  oxyCODONE    IR 10 milliGRAM(s) Oral every 3 hours PRN Severe Pain (7 - 10)                            11.6   11.93 )-----------( 238      ( 07 Mar 2019 06:15 )             35.7     03-07    139  |  102  |  3<L>  ----------------------------<  113<H>  3.4<L>   |  25  |  0.54    Ca    8.7      07 Mar 2019 06:00  Phos  1.2     03-07  Mg     1.8     03-07          Radiologic Studies:

## 2019-03-07 NOTE — PROGRESS NOTE ADULT - SUBJECTIVE AND OBJECTIVE BOX
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 Patient states she urinated multiple times overnight and was unable to sleep, she denies dysuria. Patient passing gas no bm    Vital Signs Last 24 Hrs  T(C): 36.4 (07 Mar 2019 05:50), Max: 37.1 (06 Mar 2019 21:08)  T(F): 97.5 (07 Mar 2019 05:50), Max: 98.8 (06 Mar 2019 21:08)  HR: 84 (07 Mar 2019 01:40) (81 - 104)  BP: 148/80 (07 Mar 2019 05:50) (114/63 - 148/80)  BP(mean): --  RR: 16 (07 Mar 2019 05:50) (16 - 18)  SpO2: 93% (07 Mar 2019 05:50) (92% - 100%)I&O's Detail    05 Mar 2019 07:01  -  06 Mar 2019 07:00  --------------------------------------------------------  IN:    IV PiggyBack: 50 mL    lactated ringers.: 550 mL    Oral Fluid: 30 mL  Total IN: 630 mL    OUT:    Indwelling Catheter - Urethral: 1175 mL  Total OUT: 1175 mL    Total NET: -545 mL      06 Mar 2019 07:01  -  07 Mar 2019 06:52  --------------------------------------------------------  IN:    dextrose 5% + sodium chloride 0.45%.: 750 mL    Oral Fluid: 536 mL  Total IN: 1286 mL    OUT:    Voided: 2500 mL  Total OUT: 2500 mL    Total NET: -1214 mL      MEDICATIONS  (STANDING):  acetaminophen   Tablet .. 650 milliGRAM(s) Oral every 6 hours  enoxaparin Injectable 40 milliGRAM(s) SubCutaneous daily  ondansetron Injectable 4 milliGRAM(s) IV Push once    MEDICATIONS  (PRN):  oxyCODONE    IR 5 milliGRAM(s) Oral every 3 hours PRN Moderate Pain (4 - 6)  oxyCODONE    IR 10 milliGRAM(s) Oral every 3 hours PRN Severe Pain (7 - 10)      Physical Exam  Constitutional: A&Ox3, NAD  Respiratory: nonlabored breathing  Gastrointestinal: soft appropriately tender, incision c/d/i    LABS:                        12.1   17.27 )-----------( 248      ( 06 Mar 2019 05:45 )             37.0     03-06    137  |  101  |  6<L>  ----------------------------<  118<H>  4.0   |  23  |  0.59    Ca    8.8      06 Mar 2019 05:45  Phos  3.3     03-06  Mg     1.8     03-06

## 2019-03-07 NOTE — PROVIDER CONTACT NOTE (OTHER) - SITUATION
Pt. is complaining of incisional pain; standing tylenol is not due at this time; provider said ok to give tylenol early

## 2019-03-07 NOTE — PROGRESS NOTE ADULT - ASSESSMENT
74yFemale s/p laparoscopic assisted right hemicolectomy with nausea this am  -PRN pain control with IV tylenol and PRN oxy  -Zofran for nausea  -IV lock  -IS/OOB/amb, PT  -cont CLD, f/u further GI function  -trend WBC  -strict I&O  -VTE ppx  -Seen and d/w A team

## 2019-03-08 LAB
ANION GAP SERPL CALC-SCNC: 12 MMO/L — SIGNIFICANT CHANGE UP (ref 7–14)
BUN SERPL-MCNC: 6 MG/DL — LOW (ref 7–23)
CALCIUM SERPL-MCNC: 9.7 MG/DL — SIGNIFICANT CHANGE UP (ref 8.4–10.5)
CHLORIDE SERPL-SCNC: 100 MMOL/L — SIGNIFICANT CHANGE UP (ref 98–107)
CO2 SERPL-SCNC: 28 MMOL/L — SIGNIFICANT CHANGE UP (ref 22–31)
CREAT SERPL-MCNC: 0.6 MG/DL — SIGNIFICANT CHANGE UP (ref 0.5–1.3)
GLUCOSE SERPL-MCNC: 115 MG/DL — HIGH (ref 70–99)
HCT VFR BLD CALC: 38.6 % — SIGNIFICANT CHANGE UP (ref 34.5–45)
HGB BLD-MCNC: 12.5 G/DL — SIGNIFICANT CHANGE UP (ref 11.5–15.5)
MAGNESIUM SERPL-MCNC: 1.9 MG/DL — SIGNIFICANT CHANGE UP (ref 1.6–2.6)
MCHC RBC-ENTMCNC: 30.5 PG — SIGNIFICANT CHANGE UP (ref 27–34)
MCHC RBC-ENTMCNC: 32.4 % — SIGNIFICANT CHANGE UP (ref 32–36)
MCV RBC AUTO: 94.1 FL — SIGNIFICANT CHANGE UP (ref 80–100)
NRBC # FLD: 0 K/UL — LOW (ref 25–125)
PHOSPHATE SERPL-MCNC: 3.2 MG/DL — SIGNIFICANT CHANGE UP (ref 2.5–4.5)
PLATELET # BLD AUTO: 290 K/UL — SIGNIFICANT CHANGE UP (ref 150–400)
PMV BLD: 10.5 FL — SIGNIFICANT CHANGE UP (ref 7–13)
POTASSIUM SERPL-MCNC: 3.7 MMOL/L — SIGNIFICANT CHANGE UP (ref 3.5–5.3)
POTASSIUM SERPL-SCNC: 3.7 MMOL/L — SIGNIFICANT CHANGE UP (ref 3.5–5.3)
RBC # BLD: 4.1 M/UL — SIGNIFICANT CHANGE UP (ref 3.8–5.2)
RBC # FLD: 11.7 % — SIGNIFICANT CHANGE UP (ref 10.3–14.5)
SODIUM SERPL-SCNC: 140 MMOL/L — SIGNIFICANT CHANGE UP (ref 135–145)
WBC # BLD: 11.26 K/UL — HIGH (ref 3.8–10.5)
WBC # FLD AUTO: 11.26 K/UL — HIGH (ref 3.8–10.5)

## 2019-03-08 PROCEDURE — 71045 X-RAY EXAM CHEST 1 VIEW: CPT | Mod: 26

## 2019-03-08 PROCEDURE — 74018 RADEX ABDOMEN 1 VIEW: CPT | Mod: 26

## 2019-03-08 RX ORDER — ACETAMINOPHEN 500 MG
1000 TABLET ORAL ONCE
Qty: 0 | Refills: 0 | Status: COMPLETED | OUTPATIENT
Start: 2019-03-08 | End: 2019-03-08

## 2019-03-08 RX ORDER — BENZOCAINE AND MENTHOL 5; 1 G/100ML; G/100ML
1 LIQUID ORAL EVERY 6 HOURS
Qty: 0 | Refills: 0 | Status: DISCONTINUED | OUTPATIENT
Start: 2019-03-08 | End: 2019-03-11

## 2019-03-08 RX ORDER — HYDROMORPHONE HYDROCHLORIDE 2 MG/ML
1 INJECTION INTRAMUSCULAR; INTRAVENOUS; SUBCUTANEOUS EVERY 6 HOURS
Qty: 0 | Refills: 0 | Status: DISCONTINUED | OUTPATIENT
Start: 2019-03-08 | End: 2019-03-13

## 2019-03-08 RX ORDER — ONDANSETRON 8 MG/1
8 TABLET, FILM COATED ORAL ONCE
Qty: 0 | Refills: 0 | Status: COMPLETED | OUTPATIENT
Start: 2019-03-08 | End: 2019-03-08

## 2019-03-08 RX ORDER — HYDROMORPHONE HYDROCHLORIDE 2 MG/ML
0.5 INJECTION INTRAMUSCULAR; INTRAVENOUS; SUBCUTANEOUS EVERY 6 HOURS
Qty: 0 | Refills: 0 | Status: DISCONTINUED | OUTPATIENT
Start: 2019-03-08 | End: 2019-03-13

## 2019-03-08 RX ORDER — DEXTROSE MONOHYDRATE, SODIUM CHLORIDE, AND POTASSIUM CHLORIDE 50; .745; 4.5 G/1000ML; G/1000ML; G/1000ML
1000 INJECTION, SOLUTION INTRAVENOUS
Qty: 0 | Refills: 0 | Status: DISCONTINUED | OUTPATIENT
Start: 2019-03-08 | End: 2019-03-10

## 2019-03-08 RX ORDER — SODIUM CHLORIDE 9 MG/ML
500 INJECTION INTRAMUSCULAR; INTRAVENOUS; SUBCUTANEOUS ONCE
Qty: 0 | Refills: 0 | Status: COMPLETED | OUTPATIENT
Start: 2019-03-08 | End: 2019-03-08

## 2019-03-08 RX ORDER — HYDROMORPHONE HYDROCHLORIDE 2 MG/ML
1 INJECTION INTRAMUSCULAR; INTRAVENOUS; SUBCUTANEOUS EVERY 6 HOURS
Qty: 0 | Refills: 0 | Status: DISCONTINUED | OUTPATIENT
Start: 2019-03-08 | End: 2019-03-08

## 2019-03-08 RX ADMIN — ENOXAPARIN SODIUM 40 MILLIGRAM(S): 100 INJECTION SUBCUTANEOUS at 15:06

## 2019-03-08 RX ADMIN — Medication 650 MILLIGRAM(S): at 07:04

## 2019-03-08 RX ADMIN — Medication 50 MILLIGRAM(S): at 01:39

## 2019-03-08 RX ADMIN — Medication 400 MILLIGRAM(S): at 21:38

## 2019-03-08 RX ADMIN — Medication 400 MILLIGRAM(S): at 15:06

## 2019-03-08 RX ADMIN — Medication 1000 MILLIGRAM(S): at 22:08

## 2019-03-08 RX ADMIN — ONDANSETRON 8 MILLIGRAM(S): 8 TABLET, FILM COATED ORAL at 02:31

## 2019-03-08 RX ADMIN — SODIUM CHLORIDE 500 MILLILITER(S): 9 INJECTION INTRAMUSCULAR; INTRAVENOUS; SUBCUTANEOUS at 08:49

## 2019-03-08 RX ADMIN — Medication 1000 MILLIGRAM(S): at 15:30

## 2019-03-08 RX ADMIN — Medication 650 MILLIGRAM(S): at 01:32

## 2019-03-08 RX ADMIN — DEXTROSE MONOHYDRATE, SODIUM CHLORIDE, AND POTASSIUM CHLORIDE 75 MILLILITER(S): 50; .745; 4.5 INJECTION, SOLUTION INTRAVENOUS at 09:39

## 2019-03-08 RX ADMIN — BENZOCAINE AND MENTHOL 1 LOZENGE: 5; 1 LIQUID ORAL at 16:24

## 2019-03-08 NOTE — PROGRESS NOTE ADULT - ASSESSMENT
74yFemale s/p laparoscopic assisted right hemicolectomy with nausea this am  -PRN pain control with IV tylenol and PRN oxy  -Zofran for nausea  -AM bolus IV NS  -IS/OOB/amb, PT  -cont CLD, f/u further GI function  -trend WBC  -strict I&O  -VTE ppx    [id number]

## 2019-03-08 NOTE — PROGRESS NOTE ADULT - SUBJECTIVE AND OBJECTIVE BOX
POD # 3        Subjective:  I vomited a large amount this morning after feeling full and nauseated most of the day yesterday.  I have passed some liquid stools as well as some flatus.    Objective:   Vital Signs Last 24 Hrs  T(C): 37.2 (08 Mar 2019 10:43), Max: 37.2 (07 Mar 2019 14:37)  T(F): 99 (08 Mar 2019 10:43), Max: 99 (07 Mar 2019 14:37)  HR: 100 (08 Mar 2019 10:43) (82 - 100)  BP: 117/80 (08 Mar 2019 10:43) (117/80 - 147/88)  BP(mean): --  RR: 18 (08 Mar 2019 10:43) (18 - 20)  SpO2: 96% (08 Mar 2019 10:43) (93% - 98%)    Daily     Daily Weight in k.3 (08 Mar 2019 01:24)    Heent: N/C, AT PER no scleral icterus, No JVD  Pul: clear  Cor: RRR  Abdomen: soft, non distended, non tender, decreased BS. Wound - clean  Extremities: without edema, motor/sensory intact    I&O's Detail    07 Mar 2019 07:01  -  08 Mar 2019 07:00  --------------------------------------------------------  IN:    Oral Fluid: 100 mL  Total IN: 100 mL    OUT:    Voided: 950 mL  Total OUT: 950 mL    Total NET: -850 mL      08 Mar 2019 07:  -  08 Mar 2019 13:30  --------------------------------------------------------  IN:    dextrose 5% + sodium chloride 0.9% with potassium chloride 20 mEq/L: 75 mL    Oral Fluid: 100 mL  Total IN: 175 mL    OUT:    Voided: 200 mL  Total OUT: 200 mL  < from: Xray Abdomen 1 View PORTABLE -Urgent (19 @ 09:44) >  Mildly dilated air-filled loops of small bowel are seen. Possible air   within the cecum. Nonspecific bowel gas pattern, if there is clinical   concern for obstruction cross-sectional imaging can be obtained.     Mild degenerative changes of the thoracolumbar spine.    IMPRESSION:     Mildly dilated air-filled loops of small bowel. Nonspecific bowel gas   pattern.     If there is clinical concern for obstruction cross-sectional imaging can   be obtained.    < end of copied text >    Total NET: -25 mL          MEDICATIONS  (STANDING):  acetaminophen   Tablet .. 650 milliGRAM(s) Oral every 6 hours  dextrose 5% + sodium chloride 0.9% with potassium chloride 20 mEq/L 1000 milliLiter(s) (75 mL/Hr) IV Continuous <Continuous>  enoxaparin Injectable 40 milliGRAM(s) SubCutaneous daily    MEDICATIONS  (PRN):  oxyCODONE    IR 5 milliGRAM(s) Oral every 3 hours PRN Moderate Pain (4 - 6)  oxyCODONE    IR 10 milliGRAM(s) Oral every 3 hours PRN Severe Pain (7 - 10)                            12.5   11.26 )-----------( 290      ( 08 Mar 2019 05:45 )             38.6     03-08    140  |  100  |  6<L>  ----------------------------<  115<H>  3.7   |  28  |  0.60    Ca    9.7      08 Mar 2019 05:45  Phos  3.2     03-08  Mg     1.9     03-08          Radiologic Studies:< from: Xray Abdomen 1 View PORTABLE -Urgent (19 @ 09:44) >  Mildly dilated air-filled loops of small bowel are seen. Possible air   within the cecum. Nonspecific bowel gas pattern, if there is clinical   concern for obstruction cross-sectional imaging can be obtained.     Mild degenerative changes of the thoracolumbar spine.    IMPRESSION:     Mildly dilated air-filled loops of small bowel. Nonspecific bowel gas   pattern.     If there is clinical concern for obstruction cross-sectional imaging can   be obtained.

## 2019-03-08 NOTE — PROGRESS NOTE ADULT - SUBJECTIVE AND OBJECTIVE BOX
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 Patient states she urinated multiple times overnight. Patient passing gas, no bm.    Vital Signs (24 Hrs):  T(C): 36.8 (19 @ 01:24), Max: 37.3 (19 @ 10:44)  HR: 90 (19 @ 01:24) (82 - 90)  BP: 132/76 (19 @ 01:24) (132/76 - 147/88)  RR: 20 (19 @ 01:24) (18 - 20)  SpO2: 96% (19 @ 01:24) (93% - 97%)  Wt(kg): --  Daily     Daily Weight in k.3 (08 Mar 2019 01:24)    I&O's Summary    06 Mar 2019 07:01  -  07 Mar 2019 07:00  --------------------------------------------------------  IN: 1286 mL / OUT: 2700 mL / NET: -1414 mL    07 Mar 2019 07:01  -  08 Mar 2019 06:48  --------------------------------------------------------  IN: 100 mL / OUT: 850 mL / NET: -750 mL      Physical Exam  Constitutional: A&Ox3, NAD  Respiratory: nonlabored breathing  Gastrointestinal: soft appropriately tender, incision c/d/i                          11.6   11.93 )-----------( 238      ( 07 Mar 2019 06:15 )             35.7           139  |  102  |  3<L>  ----------------------------<  113<H>  3.4<L>   |  25  |  0.54    Ca    8.7      07 Mar 2019 06:00  Phos  1.2     03-07  Mg     1.8     03-07

## 2019-03-09 LAB
ANION GAP SERPL CALC-SCNC: 10 MMO/L — SIGNIFICANT CHANGE UP (ref 7–14)
BUN SERPL-MCNC: 8 MG/DL — SIGNIFICANT CHANGE UP (ref 7–23)
CALCIUM SERPL-MCNC: 9.1 MG/DL — SIGNIFICANT CHANGE UP (ref 8.4–10.5)
CHLORIDE SERPL-SCNC: 106 MMOL/L — SIGNIFICANT CHANGE UP (ref 98–107)
CO2 SERPL-SCNC: 28 MMOL/L — SIGNIFICANT CHANGE UP (ref 22–31)
CREAT SERPL-MCNC: 0.55 MG/DL — SIGNIFICANT CHANGE UP (ref 0.5–1.3)
GLUCOSE SERPL-MCNC: 118 MG/DL — HIGH (ref 70–99)
HCT VFR BLD CALC: 35.9 % — SIGNIFICANT CHANGE UP (ref 34.5–45)
HGB BLD-MCNC: 11.6 G/DL — SIGNIFICANT CHANGE UP (ref 11.5–15.5)
MAGNESIUM SERPL-MCNC: 1.8 MG/DL — SIGNIFICANT CHANGE UP (ref 1.6–2.6)
MCHC RBC-ENTMCNC: 29.8 PG — SIGNIFICANT CHANGE UP (ref 27–34)
MCHC RBC-ENTMCNC: 32.3 % — SIGNIFICANT CHANGE UP (ref 32–36)
MCV RBC AUTO: 92.3 FL — SIGNIFICANT CHANGE UP (ref 80–100)
NRBC # FLD: 0 K/UL — LOW (ref 25–125)
PHOSPHATE SERPL-MCNC: 2.2 MG/DL — LOW (ref 2.5–4.5)
PLATELET # BLD AUTO: 320 K/UL — SIGNIFICANT CHANGE UP (ref 150–400)
PMV BLD: 10.3 FL — SIGNIFICANT CHANGE UP (ref 7–13)
POTASSIUM SERPL-MCNC: 3.5 MMOL/L — SIGNIFICANT CHANGE UP (ref 3.5–5.3)
POTASSIUM SERPL-SCNC: 3.5 MMOL/L — SIGNIFICANT CHANGE UP (ref 3.5–5.3)
RBC # BLD: 3.89 M/UL — SIGNIFICANT CHANGE UP (ref 3.8–5.2)
RBC # FLD: 11.9 % — SIGNIFICANT CHANGE UP (ref 10.3–14.5)
SODIUM SERPL-SCNC: 144 MMOL/L — SIGNIFICANT CHANGE UP (ref 135–145)
WBC # BLD: 9.76 K/UL — SIGNIFICANT CHANGE UP (ref 3.8–10.5)
WBC # FLD AUTO: 9.76 K/UL — SIGNIFICANT CHANGE UP (ref 3.8–10.5)

## 2019-03-09 PROCEDURE — 74018 RADEX ABDOMEN 1 VIEW: CPT | Mod: 26,59

## 2019-03-09 RX ADMIN — BENZOCAINE AND MENTHOL 1 LOZENGE: 5; 1 LIQUID ORAL at 03:27

## 2019-03-09 RX ADMIN — BENZOCAINE AND MENTHOL 1 LOZENGE: 5; 1 LIQUID ORAL at 14:53

## 2019-03-09 RX ADMIN — ENOXAPARIN SODIUM 40 MILLIGRAM(S): 100 INJECTION SUBCUTANEOUS at 13:52

## 2019-03-09 RX ADMIN — BENZOCAINE AND MENTHOL 1 LOZENGE: 5; 1 LIQUID ORAL at 21:09

## 2019-03-09 RX ADMIN — DEXTROSE MONOHYDRATE, SODIUM CHLORIDE, AND POTASSIUM CHLORIDE 75 MILLILITER(S): 50; .745; 4.5 INJECTION, SOLUTION INTRAVENOUS at 13:52

## 2019-03-09 NOTE — PROGRESS NOTE ADULT - SUBJECTIVE AND OBJECTIVE BOX
POD # 4      Subjective: I am passing stool and flatus. I have no abdominal pain.    Objective:   Vital Signs Last 24 Hrs  T(C): 37.4 (09 Mar 2019 10:00), Max: 37.4 (08 Mar 2019 21:37)  T(F): 99.4 (09 Mar 2019 10:00), Max: 99.4 (09 Mar 2019 10:00)  HR: 105 (09 Mar 2019 10:00) (93 - 105)  BP: 129/79 (09 Mar 2019 10:00) (117/63 - 144/87)  BP(mean): --  RR: 18 (09 Mar 2019 10:00) (16 - 18)  SpO2: 95% (09 Mar 2019 10:00) (93% - 97%)    Daily     Daily     Heent: N/C, AT PER no scleral icterus, No JVD  Pul: clear  Cor: RRR  Abdomen: soft, non distended. non tender, Wound - clean  Extremities: without edema, motor/sensory intact    I&O's Detail    08 Mar 2019 07:01  -  09 Mar 2019 07:00  --------------------------------------------------------  IN:    dextrose 5% + sodium chloride 0.9% with potassium chloride 20 mEq/L: 1650 mL    Oral Fluid: 100 mL  Total IN: 1750 mL    OUT:    Nasoenteral Tube: 1350 mL    Voided: 550 mL  Total OUT: 1900 mL    Total NET: -150 mL      09 Mar 2019 06:01  -  09 Mar 2019 13:05  --------------------------------------------------------  IN:    dextrose 5% + sodium chloride 0.9% with potassium chloride 20 mEq/L: 300 mL  Total IN: 300 mL    OUT:    Nasoenteral Tube: 250 mL    Voided: 200 mL  Total OUT: 450 mL    Total NET: -150 mL          MEDICATIONS  (STANDING):  dextrose 5% + sodium chloride 0.9% with potassium chloride 20 mEq/L 1000 milliLiter(s) (75 mL/Hr) IV Continuous <Continuous>  enoxaparin Injectable 40 milliGRAM(s) SubCutaneous daily    MEDICATIONS  (PRN):  benzocaine 15 mG/menthol 3.6 mG Lozenge 1 Lozenge Oral every 6 hours PRN Sore Throat  HYDROmorphone  Injectable 0.5 milliGRAM(s) IV Push every 6 hours PRN Moderate Pain (4 - 6)  HYDROmorphone  Injectable 1 milliGRAM(s) IV Push every 6 hours PRN Severe Pain (7 - 10)                            11.6   9.76  )-----------( 320      ( 09 Mar 2019 06:00 )             35.9     03-09    144  |  106  |  8   ----------------------------<  118<H>  3.5   |  28  |  0.55    Ca    9.1      09 Mar 2019 06:00  Phos  2.2     03-09  Mg     1.8     03-09          Radiologic Studies: Abdominal X-ray done today: Still with gas distension of the small bowel, with a cluster in the right upper quadrant.  Gas seen in the descending colon.

## 2019-03-09 NOTE — PROGRESS NOTE ADULT - ASSESSMENT
74yFemale s/p laparoscopic assisted right hemicolectomy with nausea this am  -PRN pain control with IV tylenol and PRN oxy  -Zofran for nausea  -AM bolus IV NS  -IS/OOB/amb, PT  -cont CLD, f/u further GI function  -trend WBC  -strict I&O  -VTE ppx 74yFemale s/p laparoscopic assisted right hemicolectomy with nausea this am  -PRN pain control with IV tylenol and PRN oxy  -Zofran for nausea  -IVF  -IS/OOB/amb, PT  - NPO, f/u further GI function  -strict I&O,   - Abd X ray  -VTE ppx

## 2019-03-09 NOTE — PROGRESS NOTE ADULT - SUBJECTIVE AND OBJECTIVE BOX
Morning Surgical Progress Note    SUBJECTIVE: Patient seen and examined at bedside with surgical team, patient states she is nauseated. Patient states she urinated multiple times overnight. Patient passing gas, no bm.      Vital Signs Last 24 Hrs  T(C): 37.1 (09 Mar 2019 05:07), Max: 37.4 (08 Mar 2019 21:37)  T(F): 98.8 (09 Mar 2019 05:07), Max: 99.3 (08 Mar 2019 21:37)  HR: 97 (09 Mar 2019 05:07) (93 - 101)  BP: 118/70 (09 Mar 2019 05:07) (117/63 - 144/87)  BP(mean): --  RR: 17 (09 Mar 2019 05:07) (16 - 18)  SpO2: 93% (09 Mar 2019 05:07) (93% - 97%)    I&O's Detail    08 Mar 2019 07:01  -  09 Mar 2019 07:00  --------------------------------------------------------  IN:    dextrose 5% + sodium chloride 0.9% with potassium chloride 20 mEq/L: 1650 mL    Oral Fluid: 100 mL  Total IN: 1750 mL    OUT:    Nasoenteral Tube: 1350 mL    Voided: 550 mL  Total OUT: 1900 mL    Total NET: -150 mL          MEDICATIONS  (STANDING):  dextrose 5% + sodium chloride 0.9% with potassium chloride 20 mEq/L 1000 milliLiter(s) (75 mL/Hr) IV Continuous <Continuous>  enoxaparin Injectable 40 milliGRAM(s) SubCutaneous daily    MEDICATIONS  (PRN):  benzocaine 15 mG/menthol 3.6 mG Lozenge 1 Lozenge Oral every 6 hours PRN Sore Throat  HYDROmorphone  Injectable 0.5 milliGRAM(s) IV Push every 6 hours PRN Moderate Pain (4 - 6)  HYDROmorphone  Injectable 1 milliGRAM(s) IV Push every 6 hours PRN Severe Pain (7 - 10)      LABS:                        11.6   9.76  )-----------( 320      ( 09 Mar 2019 06:00 )             35.9     03-09    144  |  106  |  8   ----------------------------<  118<H>  3.5   |  28  |  0.55    Ca    9.1      09 Mar 2019 06:00  Phos  2.2       Mg     1.8                       Vital Signs (24 Hrs):  T(C): 36.8 (19 @ 01:24), Max: 37.3 (19 @ 10:44)  HR: 90 (19 @ 01:24) (82 - 90)  BP: 132/76 (19 @ 01:24) (132/76 - 147/88)  RR: 20 (19:24) (18 - 20)  SpO2: 96% (19 @ 01:24) (93% - 97%)  Wt(kg): --  Daily     Daily Weight in k.3 (08 Mar 2019 01:24)    I&O's Summary    06 Mar 2019 07:01  -  07 Mar 2019 07:00  --------------------------------------------------------  IN: 1286 mL / OUT: 2700 mL / NET: -1414 mL    07 Mar 2019 07:01  -  08 Mar 2019 06:48  --------------------------------------------------------  IN: 100 mL / OUT: 850 mL / NET: -750 mL      Physical Exam  Constitutional: A&Ox3, NAD  Respiratory: nonlabored breathing  Gastrointestinal: soft appropriately tender, incision c/d/i                          11.6   11.93 )-----------( 238      ( 07 Mar 2019 06:15 )             35.7       03-07    139  |  102  |  3<L>  ----------------------------<  113<H>  3.4<L>   |  25  |  0.54    Ca    8.7      07 Mar 2019 06:00  Phos  1.2     03-07  Mg     1.8     03-07 Morning Surgical Progress Note    SUBJECTIVE: Patient seen and examined at bedside with surgical team, patient states she is nauseated with NG tube in place. Patient states she urinated multiple times overnight. Patient passing gas, no bm.       Vital Signs Last 24 Hrs  T(C): 37.1 (09 Mar 2019 05:07), Max: 37.4 (08 Mar 2019 21:37)  T(F): 98.8 (09 Mar 2019 05:07), Max: 99.3 (08 Mar 2019 21:37)  HR: 97 (09 Mar 2019 05:07) (93 - 101)  BP: 118/70 (09 Mar 2019 05:07) (117/63 - 144/87)  BP(mean): --  RR: 17 (09 Mar 2019 05:07) (16 - 18)  SpO2: 93% (09 Mar 2019 05:07) (93% - 97%)    I&O's Detail    08 Mar 2019 07:01  -  09 Mar 2019 07:00  --------------------------------------------------------  IN:    dextrose 5% + sodium chloride 0.9% with potassium chloride 20 mEq/L: 1650 mL    Oral Fluid: 100 mL  Total IN: 1750 mL    OUT:    Nasoenteral Tube: 1350 mL    Voided: 550 mL  Total OUT: 1900 mL    Total NET: -150 mL          MEDICATIONS  (STANDING):  dextrose 5% + sodium chloride 0.9% with potassium chloride 20 mEq/L 1000 milliLiter(s) (75 mL/Hr) IV Continuous <Continuous>  enoxaparin Injectable 40 milliGRAM(s) SubCutaneous daily    MEDICATIONS  (PRN):  benzocaine 15 mG/menthol 3.6 mG Lozenge 1 Lozenge Oral every 6 hours PRN Sore Throat  HYDROmorphone  Injectable 0.5 milliGRAM(s) IV Push every 6 hours PRN Moderate Pain (4 - 6)  HYDROmorphone  Injectable 1 milliGRAM(s) IV Push every 6 hours PRN Severe Pain (7 - 10)      LABS:                        11.6   9.76  )-----------( 320      ( 09 Mar 2019 06:00 )             35.9     03-09    144  |  106  |  8   ----------------------------<  118<H>  3.5   |  28  |  0.55    Ca    9.1      09 Mar 2019 06:00  Phos  2.2       Mg     1.8                       Vital Signs (24 Hrs):  T(C): 36.8 (19 @ 01:24), Max: 37.3 (19 @ 10:44)  HR: 90 (19 @ 01:24) (82 - 90)  BP: 132/76 (19 @ 01:24) (132/76 - 147/88)  RR: 20 (19:24) (18 - 20)  SpO2: 96% (19 @ 01:24) (93% - 97%)  Wt(kg): --  Daily     Daily Weight in k.3 (08 Mar 2019 01:24)    I&O's Summary    06 Mar 2019 07:01  -  07 Mar 2019 07:00  --------------------------------------------------------  IN: 1286 mL / OUT: 2700 mL / NET: -1414 mL    07 Mar 2019 07:01  -  08 Mar 2019 06:48  --------------------------------------------------------  IN: 100 mL / OUT: 850 mL / NET: -750 mL      Physical Exam  Constitutional: A&Ox3, NAD  Respiratory: nonlabored breathing  Gastrointestinal: soft appropriately tender, incision c/d/i                          11.6   11.93 )-----------( 238      ( 07 Mar 2019 06:15 )             35.7           139  |  102  |  3<L>  ----------------------------<  113<H>  3.4<L>   |  25  |  0.54    Ca    8.7      07 Mar 2019 06:00  Phos  1.2     03-07  Mg     1.8     03-07

## 2019-03-10 LAB
ANION GAP SERPL CALC-SCNC: 12 MMO/L — SIGNIFICANT CHANGE UP (ref 7–14)
BUN SERPL-MCNC: 9 MG/DL — SIGNIFICANT CHANGE UP (ref 7–23)
CALCIUM SERPL-MCNC: 9 MG/DL — SIGNIFICANT CHANGE UP (ref 8.4–10.5)
CHLORIDE SERPL-SCNC: 108 MMOL/L — HIGH (ref 98–107)
CO2 SERPL-SCNC: 26 MMOL/L — SIGNIFICANT CHANGE UP (ref 22–31)
CREAT SERPL-MCNC: 0.57 MG/DL — SIGNIFICANT CHANGE UP (ref 0.5–1.3)
GLUCOSE SERPL-MCNC: 132 MG/DL — HIGH (ref 70–99)
HCT VFR BLD CALC: 33.6 % — LOW (ref 34.5–45)
HGB BLD-MCNC: 10.6 G/DL — LOW (ref 11.5–15.5)
MAGNESIUM SERPL-MCNC: 1.8 MG/DL — SIGNIFICANT CHANGE UP (ref 1.6–2.6)
MCHC RBC-ENTMCNC: 29.5 PG — SIGNIFICANT CHANGE UP (ref 27–34)
MCHC RBC-ENTMCNC: 31.5 % — LOW (ref 32–36)
MCV RBC AUTO: 93.6 FL — SIGNIFICANT CHANGE UP (ref 80–100)
NRBC # FLD: 0 K/UL — LOW (ref 25–125)
PHOSPHATE SERPL-MCNC: 2 MG/DL — LOW (ref 2.5–4.5)
PLATELET # BLD AUTO: 301 K/UL — SIGNIFICANT CHANGE UP (ref 150–400)
PMV BLD: SIGNIFICANT CHANGE UP FL (ref 7–13)
POTASSIUM SERPL-MCNC: 3.2 MMOL/L — LOW (ref 3.5–5.3)
POTASSIUM SERPL-SCNC: 3.2 MMOL/L — LOW (ref 3.5–5.3)
RBC # BLD: 3.59 M/UL — LOW (ref 3.8–5.2)
RBC # FLD: SIGNIFICANT CHANGE UP % (ref 10.3–14.5)
SODIUM SERPL-SCNC: 146 MMOL/L — HIGH (ref 135–145)
WBC # BLD: 10.43 K/UL — SIGNIFICANT CHANGE UP (ref 3.8–10.5)
WBC # FLD AUTO: 10.43 K/UL — SIGNIFICANT CHANGE UP (ref 3.8–10.5)

## 2019-03-10 PROCEDURE — 71045 X-RAY EXAM CHEST 1 VIEW: CPT | Mod: 26

## 2019-03-10 PROCEDURE — 74177 CT ABD & PELVIS W/CONTRAST: CPT | Mod: 26

## 2019-03-10 RX ORDER — POTASSIUM CHLORIDE 20 MEQ
10 PACKET (EA) ORAL
Qty: 0 | Refills: 0 | Status: COMPLETED | OUTPATIENT
Start: 2019-03-10 | End: 2019-03-10

## 2019-03-10 RX ORDER — DEXTROSE MONOHYDRATE, SODIUM CHLORIDE, AND POTASSIUM CHLORIDE 50; .745; 4.5 G/1000ML; G/1000ML; G/1000ML
1000 INJECTION, SOLUTION INTRAVENOUS
Qty: 0 | Refills: 0 | Status: DISCONTINUED | OUTPATIENT
Start: 2019-03-10 | End: 2019-03-13

## 2019-03-10 RX ORDER — BENZOCAINE AND MENTHOL 5; 1 G/100ML; G/100ML
1 LIQUID ORAL THREE TIMES A DAY
Qty: 0 | Refills: 0 | Status: DISCONTINUED | OUTPATIENT
Start: 2019-03-10 | End: 2019-03-13

## 2019-03-10 RX ADMIN — ENOXAPARIN SODIUM 40 MILLIGRAM(S): 100 INJECTION SUBCUTANEOUS at 12:57

## 2019-03-10 RX ADMIN — BENZOCAINE AND MENTHOL 1 LOZENGE: 5; 1 LIQUID ORAL at 23:46

## 2019-03-10 RX ADMIN — Medication 100 MILLIEQUIVALENT(S): at 19:35

## 2019-03-10 RX ADMIN — Medication 100 MILLIEQUIVALENT(S): at 18:19

## 2019-03-10 RX ADMIN — DEXTROSE MONOHYDRATE, SODIUM CHLORIDE, AND POTASSIUM CHLORIDE 75 MILLILITER(S): 50; .745; 4.5 INJECTION, SOLUTION INTRAVENOUS at 17:47

## 2019-03-10 RX ADMIN — Medication 100 MILLIEQUIVALENT(S): at 21:36

## 2019-03-10 NOTE — PROGRESS NOTE ADULT - SUBJECTIVE AND OBJECTIVE BOX
Morning Surgical Progress Note    SUBJECTIVE: Patient seen and examined at bedside with surgical team, patient denies nausea. Patient states she urinated multiple times overnight, but little volume. Patient passing gas, no bm.     Vital Signs (24 Hrs):  T(C): 37.2 (03-10-19 @ 05:43), Max: 37.5 (03-09-19 @ 17:06)  HR: 92 (03-10-19 @ 05:43) (90 - 106)  BP: 125/69 (03-10-19 @ 05:43) (105/70 - 131/66)  RR: 18 (03-10-19 @ 05:43) (18 - 18)  SpO2: 95% (03-10-19 @ 05:43) (95% - 97%)  Wt(kg): --  Daily     Daily     I&O's Summary    09 Mar 2019 06:01  -  10 Mar 2019 07:00  --------------------------------------------------------  IN: 600 mL / OUT: 810 mL / NET: -210 mL    Physical Exam  Constitutional: A&Ox3, NAD  Respiratory: nonlabored breathing  Gastrointestinal: soft appropriately tender, incision c/d/i                          10.6   10.43 )-----------( 301      ( 10 Mar 2019 06:34 )             33.6       03-10    146<H>  |  108<H>  |  9   ----------------------------<  132<H>  3.2<L>   |  26  |  0.57    Ca    9.0      10 Mar 2019 06:34  Phos  2.0     03-10  Mg     1.8     03-10

## 2019-03-10 NOTE — PROGRESS NOTE ADULT - SUBJECTIVE AND OBJECTIVE BOX
POD # 5        Subjective: I am passing flatus and stool.  I have no abdominal pain.  My NG tube came out during the night.  I have no nausea or vomiting.    Objective:   Vital Signs Last 24 Hrs  T(C): 36.9 (10 Mar 2019 14:00), Max: 37.5 (09 Mar 2019 17:06)  T(F): 98.4 (10 Mar 2019 14:00), Max: 99.5 (09 Mar 2019 17:06)  HR: 94 (10 Mar 2019 14:00) (90 - 102)  BP: 135/68 (10 Mar 2019 14:00) (125/69 - 135/68)  BP(mean): --  RR: 18 (10 Mar 2019 14:00) (18 - 18)  SpO2: 95% (10 Mar 2019 14:00) (95% - 97%)    Daily     Daily     Heent: N/C, AT PER no scleral icterus, No JVD  Pul: clear  Cor: RRR  Abdomen: soft, normal BS. Wound - clean  Extremities: without edema, motor/sensory intact    I&O's Detail    09 Mar 2019 06:01  -  10 Mar 2019 07:00  --------------------------------------------------------  IN:    dextrose 5% + sodium chloride 0.9% with potassium chloride 20 mEq/L: 600 mL  Total IN: 600 mL    OUT:    Nasoenteral Tube: 410 mL    Voided: 400 mL  Total OUT: 810 mL    Total NET: -210 mL      10 Mar 2019 07:01  -  10 Mar 2019 14:07  --------------------------------------------------------  IN:    dextrose 5% + sodium chloride 0.9% with potassium chloride 20 mEq/L: 375 mL    Oral Fluid: 970 mL  Total IN: 1345 mL    OUT:    Voided: 100 mL  Total OUT: 100 mL    Total NET: 1245 mL          MEDICATIONS  (STANDING):  dextrose 5% + sodium chloride 0.9% with potassium chloride 20 mEq/L 1000 milliLiter(s) (75 mL/Hr) IV Continuous <Continuous>  enoxaparin Injectable 40 milliGRAM(s) SubCutaneous daily    MEDICATIONS  (PRN):  benzocaine 15 mG/menthol 3.6 mG Lozenge 1 Lozenge Oral every 6 hours PRN Sore Throat  HYDROmorphone  Injectable 0.5 milliGRAM(s) IV Push every 6 hours PRN Moderate Pain (4 - 6)  HYDROmorphone  Injectable 1 milliGRAM(s) IV Push every 6 hours PRN Severe Pain (7 - 10)                            10.6   10.43 )-----------( 301      ( 10 Mar 2019 06:34 )             33.6     03-10    146<H>  |  108<H>  |  9   ----------------------------<  132<H>  3.2<L>   |  26  |  0.57    Ca    9.0      10 Mar 2019 06:34  Phos  2.0     03-10  Mg     1.8     03-10          Radiologic Studies:  < from: CT Abdomen and Pelvis w/ Oral Cont and w/ IV Cont (03.10.19 @ 13:39) >  Bowel: Post right hemicolectomy with dilated loops of proximal small   bowel and collapsed distal small bowel and proximal colon. Small amount   of oral contrast in the distal small bowel without a transition point.    Pelvis:  Air within the urinary bladder noted related to recent   instrumentation. Small amount of pelvic ascites noted. Pelvic organs are   unremarkable.     Bones and soft tissues: No focal lytic or blastic lesions.    IMPRESSION:    Post right hemicolectomy with low-grade or partial small bowel   obstruction. No abscess or perforation.    Minimal patchy opacity in the right lower lobe which may reflect focal   areas of mucoid impaction    < end of copied text >

## 2019-03-10 NOTE — PROGRESS NOTE ADULT - ASSESSMENT
74F s/p laparoscopic assisted right hemicolectomy     - CT abd pelvis with contrast today  - Monitor UOP  - PRN pain control with IV tylenol and PRN oxy  - Zofran for nausea  - IVF  - IS/OOB/amb, PT  - NPO, f/u further GI function  - strict I&O,   - VTE ppx    [id number]

## 2019-03-11 LAB
ANION GAP SERPL CALC-SCNC: 12 MMO/L — SIGNIFICANT CHANGE UP (ref 7–14)
ANION GAP SERPL CALC-SCNC: 15 MMO/L — HIGH (ref 7–14)
BUN SERPL-MCNC: 7 MG/DL — SIGNIFICANT CHANGE UP (ref 7–23)
BUN SERPL-MCNC: 9 MG/DL — SIGNIFICANT CHANGE UP (ref 7–23)
CALCIUM SERPL-MCNC: 8.8 MG/DL — SIGNIFICANT CHANGE UP (ref 8.4–10.5)
CALCIUM SERPL-MCNC: 9 MG/DL — SIGNIFICANT CHANGE UP (ref 8.4–10.5)
CHLORIDE SERPL-SCNC: 105 MMOL/L — SIGNIFICANT CHANGE UP (ref 98–107)
CHLORIDE SERPL-SCNC: 106 MMOL/L — SIGNIFICANT CHANGE UP (ref 98–107)
CO2 SERPL-SCNC: 22 MMOL/L — SIGNIFICANT CHANGE UP (ref 22–31)
CO2 SERPL-SCNC: 26 MMOL/L — SIGNIFICANT CHANGE UP (ref 22–31)
CREAT SERPL-MCNC: 0.45 MG/DL — LOW (ref 0.5–1.3)
CREAT SERPL-MCNC: 0.5 MG/DL — SIGNIFICANT CHANGE UP (ref 0.5–1.3)
GLUCOSE SERPL-MCNC: 110 MG/DL — HIGH (ref 70–99)
GLUCOSE SERPL-MCNC: 117 MG/DL — HIGH (ref 70–99)
HCT VFR BLD CALC: 32.7 % — LOW (ref 34.5–45)
HGB BLD-MCNC: 10.5 G/DL — LOW (ref 11.5–15.5)
MAGNESIUM SERPL-MCNC: 1.6 MG/DL — SIGNIFICANT CHANGE UP (ref 1.6–2.6)
MAGNESIUM SERPL-MCNC: 2.1 MG/DL — SIGNIFICANT CHANGE UP (ref 1.6–2.6)
MCHC RBC-ENTMCNC: 30.3 PG — SIGNIFICANT CHANGE UP (ref 27–34)
MCHC RBC-ENTMCNC: 32.1 % — SIGNIFICANT CHANGE UP (ref 32–36)
MCV RBC AUTO: 94.5 FL — SIGNIFICANT CHANGE UP (ref 80–100)
NRBC # FLD: 0 K/UL — LOW (ref 25–125)
PHOSPHATE SERPL-MCNC: 2.2 MG/DL — LOW (ref 2.5–4.5)
PHOSPHATE SERPL-MCNC: 3.1 MG/DL — SIGNIFICANT CHANGE UP (ref 2.5–4.5)
PLATELET # BLD AUTO: 282 K/UL — SIGNIFICANT CHANGE UP (ref 150–400)
PMV BLD: 10.5 FL — SIGNIFICANT CHANGE UP (ref 7–13)
POTASSIUM SERPL-MCNC: 3.3 MMOL/L — LOW (ref 3.5–5.3)
POTASSIUM SERPL-MCNC: 3.5 MMOL/L — SIGNIFICANT CHANGE UP (ref 3.5–5.3)
POTASSIUM SERPL-SCNC: 3.3 MMOL/L — LOW (ref 3.5–5.3)
POTASSIUM SERPL-SCNC: 3.5 MMOL/L — SIGNIFICANT CHANGE UP (ref 3.5–5.3)
RBC # BLD: 3.46 M/UL — LOW (ref 3.8–5.2)
RBC # FLD: 12.1 % — SIGNIFICANT CHANGE UP (ref 10.3–14.5)
SODIUM SERPL-SCNC: 142 MMOL/L — SIGNIFICANT CHANGE UP (ref 135–145)
SODIUM SERPL-SCNC: 144 MMOL/L — SIGNIFICANT CHANGE UP (ref 135–145)
WBC # BLD: 8.1 K/UL — SIGNIFICANT CHANGE UP (ref 3.8–10.5)
WBC # FLD AUTO: 8.1 K/UL — SIGNIFICANT CHANGE UP (ref 3.8–10.5)

## 2019-03-11 PROCEDURE — 71045 X-RAY EXAM CHEST 1 VIEW: CPT | Mod: 26

## 2019-03-11 RX ORDER — POTASSIUM PHOSPHATE, MONOBASIC POTASSIUM PHOSPHATE, DIBASIC 236; 224 MG/ML; MG/ML
15 INJECTION, SOLUTION INTRAVENOUS ONCE
Qty: 0 | Refills: 0 | Status: COMPLETED | OUTPATIENT
Start: 2019-03-11 | End: 2019-03-11

## 2019-03-11 RX ORDER — TETRACAINE/BENZOCAINE/BUTAMBEN 2%-14%-2%
1 OINTMENT (GRAM) TOPICAL
Qty: 0 | Refills: 0 | Status: DISCONTINUED | OUTPATIENT
Start: 2019-03-11 | End: 2019-03-13

## 2019-03-11 RX ORDER — METOCLOPRAMIDE HCL 10 MG
10 TABLET ORAL EVERY 6 HOURS
Qty: 0 | Refills: 0 | Status: DISCONTINUED | OUTPATIENT
Start: 2019-03-11 | End: 2019-03-13

## 2019-03-11 RX ORDER — POTASSIUM CHLORIDE 20 MEQ
10 PACKET (EA) ORAL
Qty: 0 | Refills: 0 | Status: COMPLETED | OUTPATIENT
Start: 2019-03-11 | End: 2019-03-11

## 2019-03-11 RX ORDER — MAGNESIUM SULFATE 500 MG/ML
2 VIAL (ML) INJECTION ONCE
Qty: 0 | Refills: 0 | Status: COMPLETED | OUTPATIENT
Start: 2019-03-11 | End: 2019-03-11

## 2019-03-11 RX ORDER — METOCLOPRAMIDE HCL 10 MG
10 TABLET ORAL EVERY 6 HOURS
Qty: 0 | Refills: 0 | Status: DISCONTINUED | OUTPATIENT
Start: 2019-03-11 | End: 2019-03-11

## 2019-03-11 RX ADMIN — Medication 10 MILLIGRAM(S): at 18:24

## 2019-03-11 RX ADMIN — Medication 50 GRAM(S): at 09:29

## 2019-03-11 RX ADMIN — Medication 100 MILLIEQUIVALENT(S): at 23:05

## 2019-03-11 RX ADMIN — Medication 100 MILLIEQUIVALENT(S): at 11:13

## 2019-03-11 RX ADMIN — Medication 10 MILLIGRAM(S): at 14:14

## 2019-03-11 RX ADMIN — POTASSIUM PHOSPHATE, MONOBASIC POTASSIUM PHOSPHATE, DIBASIC 62.5 MILLIMOLE(S): 236; 224 INJECTION, SOLUTION INTRAVENOUS at 15:37

## 2019-03-11 RX ADMIN — DEXTROSE MONOHYDRATE, SODIUM CHLORIDE, AND POTASSIUM CHLORIDE 75 MILLILITER(S): 50; .745; 4.5 INJECTION, SOLUTION INTRAVENOUS at 12:43

## 2019-03-11 RX ADMIN — Medication 100 MILLIEQUIVALENT(S): at 12:43

## 2019-03-11 RX ADMIN — Medication 100 MILLIEQUIVALENT(S): at 21:55

## 2019-03-11 RX ADMIN — ENOXAPARIN SODIUM 40 MILLIGRAM(S): 100 INJECTION SUBCUTANEOUS at 14:11

## 2019-03-11 RX ADMIN — Medication 100 MILLIEQUIVALENT(S): at 20:45

## 2019-03-11 NOTE — PROGRESS NOTE ADULT - ASSESSMENT
74F s/p laparoscopic assisted right hemicolectomy with ileus     - NPO with NG Tube to LWS  - PRN pain control with IV tylenol and PRN oxy  - Monitor GI function  - IVF  - IS/OOB/amb, PT  - strict I&O,   - VTE ppx    [id number]

## 2019-03-11 NOTE — PROGRESS NOTE ADULT - PROBLEM SELECTOR PLAN 1
S/P Laparoscopic appendectomy  S/P Laparoscopic assisted right francia-colectomy  Pending pathology report
S/P Laparoscopic right francia-colectomy  Awaiting pathology report
S/P Laparoscopic right francia-colectomy  Awaiting pathology results
S/P Laparoscopic right hemicolectomy  Awaiting pathology report
S/P Right francia-colectomy  Awaiting pathology results
S/P laparoscopic appendectomy  S/P Laparoscopic right hemicolectomy  Awaiting pathology report

## 2019-03-11 NOTE — PROGRESS NOTE ADULT - SUBJECTIVE AND OBJECTIVE BOX
POD # 6        Subjective: I did not sleep well. I had a bowel movement,  I have no abdominal pain,    Objective:   Vital Signs Last 24 Hrs  T(C): 36.8 (11 Mar 2019 05:51), Max: 37.1 (10 Mar 2019 18:26)  T(F): 98.3 (11 Mar 2019 05:51), Max: 98.8 (10 Mar 2019 18:26)  HR: 72 (11 Mar 2019 05:51) (72 - 101)  BP: 107/53 (11 Mar 2019 05:51) (103/52 - 135/68)  BP(mean): --  RR: 18 (11 Mar 2019 05:51) (18 - 18)  SpO2: 96% (11 Mar 2019 05:51) (94% - 96%)    Daily     Daily     Heent: N/C, AT PER no scleral icterus, No JVD  Pul: clear  Cor: RRR  Abdomen: soft, non distended, normal BS. Wound - clean  Extremities: without edema, motor/sensory intact    I&O's Detail    10 Mar 2019 07:01  -  11 Mar 2019 07:00  --------------------------------------------------------  IN:    dextrose 5% + sodium chloride 0.9% with potassium chloride 20 mEq/L: 375 mL    dextrose 5% + sodium chloride 0.9% with potassium chloride 20 mEq/L: 300 mL    IV PiggyBack: 100 mL    Oral Fluid: 970 mL  Total IN: 1745 mL    OUT:    Nasoenteral Tube: 900 mL    Voided: 400 mL  Total OUT: 1300 mL    Total NET: 445 mL          MEDICATIONS  (STANDING):  dextrose 5% + sodium chloride 0.9% with potassium chloride 20 mEq/L 1000 milliLiter(s) (75 mL/Hr) IV Continuous <Continuous>  enoxaparin Injectable 40 milliGRAM(s) SubCutaneous daily  magnesium sulfate  IVPB 2 Gram(s) IV Intermittent once  metoclopramide  IVPB 10 milliGRAM(s) IV Intermittent every 6 hours  potassium chloride  10 mEq/100 mL IVPB 10 milliEquivalent(s) IV Intermittent every 1 hour  potassium phosphate IVPB 15 milliMole(s) IV Intermittent once    MEDICATIONS  (PRN):  benzocaine 15 mG/menthol 3.6 mG (Sugar-Free) Lozenge 1 Lozenge Oral three times a day PRN Sore Throat  benzocaine 15 mG/menthol 3.6 mG Lozenge 1 Lozenge Oral every 6 hours PRN Sore Throat  HYDROmorphone  Injectable 0.5 milliGRAM(s) IV Push every 6 hours PRN Moderate Pain (4 - 6)  HYDROmorphone  Injectable 1 milliGRAM(s) IV Push every 6 hours PRN Severe Pain (7 - 10)                            10.5   8.10  )-----------( 282      ( 11 Mar 2019 07:03 )             32.7     03-11    144  |  106  |  7   ----------------------------<  110<H>  3.3<L>   |  26  |  0.50    Ca    8.8      11 Mar 2019 07:03  Phos  2.2     03-11  Mg     1.6     03-11          Radiologic Studies:

## 2019-03-11 NOTE — PROGRESS NOTE ADULT - PROBLEM SELECTOR PLAN 2
Delayed GI function suggestive of partial SBO vs ileus.  NPO  Will continue NG suction one more day and start on REGLAN q 6 h.  Small bowel follow through tomorrow am.
Had vomiting - Abdominal X-Ray suggests ileus vs obstruction  Will insert NG tube for naso-gastric decompression  To repeat AXR in the morning.
Passing flatus and stool  Pain well controlled  To remove Harrison catheter  Advance diet in am.
Passing stool and flatus but CT scan shows dilated stomach and proximal small bowel  suggestive of partial small bowel obstruction  We will reinsert the naso-gastric tube for GI decompression.  Eventually we will do a small bowel follow through.  To continue IV hydration - If the SBO does not resolve, we will have to consider TPN in a few days.
Still with ileus vs possible partial SBO.  To continue naso-gastric decompression  To repeat Flat plate and upright abdominal series in AM.
Had nausea yesterday, although she has continued to pass flatus.  Will keep on liquid today and advance to regular if no nausea by tomorrow.

## 2019-03-11 NOTE — PROGRESS NOTE ADULT - PROBLEM SELECTOR PROBLEM 1
Malignant neoplasm of appendix

## 2019-03-11 NOTE — PROGRESS NOTE ADULT - PROBLEM SELECTOR PROBLEM 2
Post-operative state

## 2019-03-11 NOTE — PROGRESS NOTE ADULT - SUBJECTIVE AND OBJECTIVE BOX
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 (10 Mar 2019 14:00)      SUBJECTIVE: Patient seen and examined at bedside with surgical team, patient denies nausea and is passing gas, had 2 BMs yesterday    Vital Signs Last 24 Hrs  T(C): 36.8 (11 Mar 2019 05:51), Max: 37.1 (10 Mar 2019 18:26)  T(F): 98.3 (11 Mar 2019 05:51), Max: 98.8 (10 Mar 2019 18:26)  HR: 72 (11 Mar 2019 05:51) (72 - 101)  BP: 107/53 (11 Mar 2019 05:51) (103/52 - 135/68)  RR: 18 (11 Mar 2019 05:51) (18 - 18)  SpO2: 96% (11 Mar 2019 05:51) (94% - 96%)I&O's Detail    10 Mar 2019 07:01  -  11 Mar 2019 07:00  --------------------------------------------------------  IN:    dextrose 5% + sodium chloride 0.9% with potassium chloride 20 mEq/L: 300 mL    dextrose 5% + sodium chloride 0.9% with potassium chloride 20 mEq/L: 375 mL    IV PiggyBack: 100 mL    Oral Fluid: 970 mL  Total IN: 1745 mL  OUT:    Nasoenteral Tube: 500 mL    Voided: 400 mL  Total OUT: 900 mL  Total NET: 845 mL    MEDICATIONS  (STANDING):  dextrose 5% + sodium chloride 0.9% with potassium chloride 20 mEq/L 1000 milliLiter(s) (75 mL/Hr) IV Continuous <Continuous>  enoxaparin Injectable 40 milliGRAM(s) SubCutaneous daily  MEDICATIONS  (PRN):  benzocaine 15 mG/menthol 3.6 mG (Sugar-Free) Lozenge 1 Lozenge Oral three times a day PRN Sore Throat  benzocaine 15 mG/menthol 3.6 mG Lozenge 1 Lozenge Oral every 6 hours PRN Sore Throat  HYDROmorphone  Injectable 0.5 milliGRAM(s) IV Push every 6 hours PRN Moderate Pain (4 - 6)  HYDROmorphone  Injectable 1 milliGRAM(s) IV Push every 6 hours PRN Severe Pain (7 - 10)      Physical Exam  Constitutional: A&Ox3, NAD  Respiratory: unlabored breathing  Gastrointestinal: Soft nontender, mildly distended    LABS:                        10.6   10.43 )-----------( 301      ( 10 Mar 2019 06:34 )             33.6     03-10    146<H>  |  108<H>  |  9   ----------------------------<  132<H>  3.2<L>   |  26  |  0.57    Ca    9.0      10 Mar 2019 06:34  Phos  2.0     03-10  Mg     1.8     03-10

## 2019-03-12 LAB
ANION GAP SERPL CALC-SCNC: 11 MMO/L — SIGNIFICANT CHANGE UP (ref 7–14)
BUN SERPL-MCNC: 5 MG/DL — LOW (ref 7–23)
CALCIUM SERPL-MCNC: 8.9 MG/DL — SIGNIFICANT CHANGE UP (ref 8.4–10.5)
CHLORIDE SERPL-SCNC: 107 MMOL/L — SIGNIFICANT CHANGE UP (ref 98–107)
CO2 SERPL-SCNC: 24 MMOL/L — SIGNIFICANT CHANGE UP (ref 22–31)
CREAT SERPL-MCNC: 0.48 MG/DL — LOW (ref 0.5–1.3)
GLUCOSE SERPL-MCNC: 104 MG/DL — HIGH (ref 70–99)
HCT VFR BLD CALC: 30.7 % — LOW (ref 34.5–45)
HGB BLD-MCNC: 10 G/DL — LOW (ref 11.5–15.5)
MAGNESIUM SERPL-MCNC: 1.9 MG/DL — SIGNIFICANT CHANGE UP (ref 1.6–2.6)
MCHC RBC-ENTMCNC: 29.9 PG — SIGNIFICANT CHANGE UP (ref 27–34)
MCHC RBC-ENTMCNC: 32.6 % — SIGNIFICANT CHANGE UP (ref 32–36)
MCV RBC AUTO: 91.6 FL — SIGNIFICANT CHANGE UP (ref 80–100)
NRBC # FLD: 0 K/UL — LOW (ref 25–125)
PHOSPHATE SERPL-MCNC: 2.9 MG/DL — SIGNIFICANT CHANGE UP (ref 2.5–4.5)
PLATELET # BLD AUTO: 270 K/UL — SIGNIFICANT CHANGE UP (ref 150–400)
PMV BLD: 10.5 FL — SIGNIFICANT CHANGE UP (ref 7–13)
POTASSIUM SERPL-MCNC: 3.8 MMOL/L — SIGNIFICANT CHANGE UP (ref 3.5–5.3)
POTASSIUM SERPL-SCNC: 3.8 MMOL/L — SIGNIFICANT CHANGE UP (ref 3.5–5.3)
RBC # BLD: 3.35 M/UL — LOW (ref 3.8–5.2)
RBC # FLD: 11.9 % — SIGNIFICANT CHANGE UP (ref 10.3–14.5)
SODIUM SERPL-SCNC: 142 MMOL/L — SIGNIFICANT CHANGE UP (ref 135–145)
WBC # BLD: 8.01 K/UL — SIGNIFICANT CHANGE UP (ref 3.8–10.5)
WBC # FLD AUTO: 8.01 K/UL — SIGNIFICANT CHANGE UP (ref 3.8–10.5)

## 2019-03-12 PROCEDURE — 74018 RADEX ABDOMEN 1 VIEW: CPT | Mod: 26,59

## 2019-03-12 PROCEDURE — 74250 X-RAY XM SM INT 1CNTRST STD: CPT | Mod: 26

## 2019-03-12 RX ORDER — POTASSIUM CHLORIDE 20 MEQ
10 PACKET (EA) ORAL
Qty: 0 | Refills: 0 | Status: COMPLETED | OUTPATIENT
Start: 2019-03-12 | End: 2019-03-12

## 2019-03-12 RX ADMIN — Medication 100 MILLIEQUIVALENT(S): at 15:00

## 2019-03-12 RX ADMIN — Medication 10 MILLIGRAM(S): at 11:55

## 2019-03-12 RX ADMIN — Medication 1 SPRAY(S): at 00:57

## 2019-03-12 RX ADMIN — Medication 10 MILLIGRAM(S): at 00:16

## 2019-03-12 RX ADMIN — Medication 100 MILLIEQUIVALENT(S): at 13:33

## 2019-03-12 RX ADMIN — DEXTROSE MONOHYDRATE, SODIUM CHLORIDE, AND POTASSIUM CHLORIDE 75 MILLILITER(S): 50; .745; 4.5 INJECTION, SOLUTION INTRAVENOUS at 11:56

## 2019-03-12 RX ADMIN — Medication 10 MILLIGRAM(S): at 05:46

## 2019-03-12 RX ADMIN — ENOXAPARIN SODIUM 40 MILLIGRAM(S): 100 INJECTION SUBCUTANEOUS at 11:56

## 2019-03-12 RX ADMIN — Medication 100 MILLIEQUIVALENT(S): at 11:56

## 2019-03-12 RX ADMIN — Medication 10 MILLIGRAM(S): at 17:36

## 2019-03-12 NOTE — PROGRESS NOTE ADULT - NSICDXPROBLEM_GEN_ALL_CORE_FT
PROBLEM DIAGNOSES  Problem: Post-operative state  Assessment and Plan:     Problem: Malignant neoplasm of appendix  Assessment and Plan:

## 2019-03-12 NOTE — PROVIDER CONTACT NOTE (OTHER) - NAME OF MD/NP/PA/DO NOTIFIED:
Batool Flores
Cesar Fishman- Surg A
Jerad Galindo -Surg A
MARITA JOHNSON MD
MARITA JOHNSON MD- Surg A

## 2019-03-12 NOTE — PROGRESS NOTE ADULT - SUBJECTIVE AND OBJECTIVE BOX
POD # 7        Subjective: I have no abdominal pain.  I had some diarrhea    Objective:   Vital Signs Last 24 Hrs  T(C): 36.7 (12 Mar 2019 07:47), Max: 36.9 (11 Mar 2019 17:07)  T(F): 98.1 (12 Mar 2019 07:47), Max: 98.5 (11 Mar 2019 17:07)  HR: 72 (12 Mar 2019 07:47) (72 - 91)  BP: 106/64 (12 Mar 2019 07:47) (106/64 - 140/57)  BP(mean): --  RR: 18 (12 Mar 2019 07:47) (18 - 20)  SpO2: 96% (12 Mar 2019 07:47) (95% - 98%)    Daily     Daily Weight in k.2 (12 Mar 2019 01:56)    Heent: N/C, AT PER no scleral icterus, No JVD  Pul: clear  Cor: RRR  Abdomen: soft, normal BS. Wound - clean  Extremities: without edema, motor/sensory intact    I&O's Detail    11 Mar 2019 07:01  -  12 Mar 2019 07:00  --------------------------------------------------------  IN:    dextrose 5% + sodium chloride 0.9% with potassium chloride 20 mEq/L: 750 mL    IV PiggyBack: 285 mL  Total IN: 1035 mL    OUT:    Nasoenteral Tube: 720 mL    Voided: 250 mL  Total OUT: 970 mL    Total NET: 65 mL          MEDICATIONS  (STANDING):  dextrose 5% + sodium chloride 0.9% with potassium chloride 20 mEq/L 1000 milliLiter(s) (75 mL/Hr) IV Continuous <Continuous>  enoxaparin Injectable 40 milliGRAM(s) SubCutaneous daily  metoclopramide Injectable 10 milliGRAM(s) IV Push every 6 hours    MEDICATIONS  (PRN):  benzocaine 15 mG/menthol 3.6 mG (Sugar-Free) Lozenge 1 Lozenge Oral three times a day PRN Sore Throat  HYDROmorphone  Injectable 0.5 milliGRAM(s) IV Push every 6 hours PRN Moderate Pain (4 - 6)  HYDROmorphone  Injectable 1 milliGRAM(s) IV Push every 6 hours PRN Severe Pain (7 - 10)  tetracaine/benzocaine/butamben Spray 1 Spray(s) Topical four times a day PRN throat irritation                            10.0   8.01  )-----------( 270      ( 12 Mar 2019 06:01 )             30.7     03-12    142  |  107  |  5<L>  ----------------------------<  104<H>  3.8   |  24  |  0.48<L>    Ca    8.9      12 Mar 2019 06:01  Phos  2.9     03-12  Mg     1.9     03-12    Abdominal X-Ray done this morning  with no official report yet. Small bowel distension has improved.  However, there are still some loops that are gas filled in the right upper and the left upper quadrants. No much gas seen in the colon.      Radiologic Studies:

## 2019-03-12 NOTE — PROVIDER CONTACT NOTE (OTHER) - BACKGROUND
admitted for malignant neoplasm of appendix
75y/o female admitted for Malignant neoplasm of appendix
Patient admitted for malignant neoplasm of appendix
Patient admitted for malignant neoplasm of appendix, s/p right francia 3/5
Patient s/p hemicolectomy. NGT placed for n/v. Patient receiving D5 0.9% NS and runs of K+ and Mag+.

## 2019-03-12 NOTE — PROGRESS NOTE ADULT - SUBJECTIVE AND OBJECTIVE BOX
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 Patient states she is voiding but has not been saving it all.     Vital Signs Last 24 Hrs  T(C): 36.8 (12 Mar 2019 01:56), Max: 37.2 (11 Mar 2019 10:20)  T(F): 98.3 (12 Mar 2019 01:56), Max: 98.9 (11 Mar 2019 10:20)  HR: 78 (12 Mar 2019 01:56) (76 - 91)  BP: 113/55 (12 Mar 2019 01:56) (112/65 - 140/57)  RR: 20 (12 Mar 2019 01:56) (18 - 20)  SpO2: 96% (12 Mar 2019 01:56) (95% - 98%)I&O's Detail    11 Mar 2019 07:01  -  12 Mar 2019 07:00  --------------------------------------------------------  IN:    dextrose 5% + sodium chloride 0.9% with potassium chloride 20 mEq/L: 750 mL    IV PiggyBack: 285 mL  Total IN: 1035 mL  OUT:    Nasoenteral Tube: 220 mL    Voided: 150 mL  Total OUT: 370 mL  Total NET: 665 mL    MEDICATIONS  (STANDING):  dextrose 5% + sodium chloride 0.9% with potassium chloride 20 mEq/L 1000 milliLiter(s) (75 mL/Hr) IV Continuous <Continuous>  enoxaparin Injectable 40 milliGRAM(s) SubCutaneous daily  metoclopramide Injectable 10 milliGRAM(s) IV Push every 6 hours  MEDICATIONS  (PRN):  benzocaine 15 mG/menthol 3.6 mG (Sugar-Free) Lozenge 1 Lozenge Oral three times a day PRN Sore Throat  HYDROmorphone  Injectable 0.5 milliGRAM(s) IV Push every 6 hours PRN Moderate Pain (4 - 6)  HYDROmorphone  Injectable 1 milliGRAM(s) IV Push every 6 hours PRN Severe Pain (7 - 10)  tetracaine/benzocaine/butamben Spray 1 Spray(s) Topical four times a day PRN throat irritation    Physical Exam  Constitutional: A&Ox3, NAD, + NGT  Gastrointestinal:     LABS:             10.0   8.01  )-----------( 270      ( 12 Mar 2019 06:01 )             30.7   03-11  142  |  105  |  9   ----------------------------<  117<H>  3.5   |  22  |  0.45<L>  Ca    9.0      11 Mar 2019 18:07  Phos  3.1     03-11  Mg     2.1     03-11

## 2019-03-12 NOTE — PROVIDER CONTACT NOTE (OTHER) - REASON
NGT came out
NGT dislodged
NGT pulled slightly down and patient having loose stool
Patient vomiting
Pt. complaining of pain

## 2019-03-12 NOTE — PROGRESS NOTE ADULT - ASSESSMENT
74F s/p laparoscopic assisted right hemicolectomy with ileus now with GI function    - NPO with NG Tube to LWS, possibly remove today  - PRN pain control with IV tylenol  - Monitor GI function  - IVF  - IS/OOB/amb, PT  - strict I&O  - VTE ppx  - seen and discussed with a team    [id number]

## 2019-03-12 NOTE — PROVIDER CONTACT NOTE (OTHER) - ASSESSMENT
A&Ox4, VS stable, no signs or symptoms of resp distress. Complaining of incisional pain.
Patient is A + O x 4, resting comfortably. Suction turned off and tubing placed at bedside.
Patient resting in bed with no signs of distress. Patient had 3 loose BM, denies having pain, N/V. No signs of bleeding noted.
Patient vomited 1x after c/o feeling nauseous throughout the night. Gave zofran 8 mg IVP at night.
Pt denies pain, sob, n/v. +BM, flatus. Dark brown/green output noted from NGT when it was maintained on low continuous suction

## 2019-03-12 NOTE — PROVIDER CONTACT NOTE (OTHER) - ACTION/TREATMENT ORDERED:
Provider said to give tylenol early. Continue to monitor for pain
MARITA JOHNSON MD made aware, ordered to keep NGT off for now and to maintain NPO status. Will continue to monitor
Provider made aware, assessed patient at bedside. Stated the NGT was ok, to just put new tape in place. Provider stated loose BM is ok and will just do regular AM labs and see K+ levels later today.
Provider made aware, was at bedside assessing and talking to patient. NS bolus to be given.
Surgical team CARSON Flores made aware and will assess patient shortly. Will continue to monitor.

## 2019-03-12 NOTE — PROVIDER CONTACT NOTE (OTHER) - SITUATION
Patient's NGT moved slightly down when changing her gown, and patient has been having 3 loose BM, had 3 K+ runs. Patient's NGT moved slightly down when changing her gown, and patient has been having 3 loose BM, had 3 potassium chloride IVPB runs.

## 2019-03-12 NOTE — PROGRESS NOTE ADULT - ASSESSMENT
S/P laparoscopic right hemicolectomy   Pathology report still pending  Post-operative ileus vs partial sbo.  X-Ray done this am still shows some dilated loops of small bowel, although she is passing flatus and stool.  We will obtain a small bowel follow through this morning.  Further decision will depend on the results.

## 2019-03-13 LAB
ANION GAP SERPL CALC-SCNC: 13 MMO/L — SIGNIFICANT CHANGE UP (ref 7–14)
BUN SERPL-MCNC: 4 MG/DL — LOW (ref 7–23)
CALCIUM SERPL-MCNC: 9.1 MG/DL — SIGNIFICANT CHANGE UP (ref 8.4–10.5)
CHLORIDE SERPL-SCNC: 102 MMOL/L — SIGNIFICANT CHANGE UP (ref 98–107)
CO2 SERPL-SCNC: 24 MMOL/L — SIGNIFICANT CHANGE UP (ref 22–31)
CREAT SERPL-MCNC: 0.5 MG/DL — SIGNIFICANT CHANGE UP (ref 0.5–1.3)
GLUCOSE SERPL-MCNC: 90 MG/DL — SIGNIFICANT CHANGE UP (ref 70–99)
HCT VFR BLD CALC: 31.7 % — LOW (ref 34.5–45)
HGB BLD-MCNC: 10.3 G/DL — LOW (ref 11.5–15.5)
MAGNESIUM SERPL-MCNC: 1.9 MG/DL — SIGNIFICANT CHANGE UP (ref 1.6–2.6)
MCHC RBC-ENTMCNC: 30.5 PG — SIGNIFICANT CHANGE UP (ref 27–34)
MCHC RBC-ENTMCNC: 32.5 % — SIGNIFICANT CHANGE UP (ref 32–36)
MCV RBC AUTO: 93.8 FL — SIGNIFICANT CHANGE UP (ref 80–100)
NRBC # FLD: 0 K/UL — LOW (ref 25–125)
PHOSPHATE SERPL-MCNC: 3.5 MG/DL — SIGNIFICANT CHANGE UP (ref 2.5–4.5)
PLATELET # BLD AUTO: 326 K/UL — SIGNIFICANT CHANGE UP (ref 150–400)
PMV BLD: 10.5 FL — SIGNIFICANT CHANGE UP (ref 7–13)
POTASSIUM SERPL-MCNC: 3.7 MMOL/L — SIGNIFICANT CHANGE UP (ref 3.5–5.3)
POTASSIUM SERPL-SCNC: 3.7 MMOL/L — SIGNIFICANT CHANGE UP (ref 3.5–5.3)
RBC # BLD: 3.38 M/UL — LOW (ref 3.8–5.2)
RBC # FLD: 12 % — SIGNIFICANT CHANGE UP (ref 10.3–14.5)
SODIUM SERPL-SCNC: 139 MMOL/L — SIGNIFICANT CHANGE UP (ref 135–145)
SURGICAL PATHOLOGY STUDY: SIGNIFICANT CHANGE UP
WBC # BLD: 7.59 K/UL — SIGNIFICANT CHANGE UP (ref 3.8–10.5)
WBC # FLD AUTO: 7.59 K/UL — SIGNIFICANT CHANGE UP (ref 3.8–10.5)

## 2019-03-13 RX ORDER — OXYCODONE HYDROCHLORIDE 5 MG/1
5 TABLET ORAL EVERY 6 HOURS
Qty: 0 | Refills: 0 | Status: DISCONTINUED | OUTPATIENT
Start: 2019-03-13 | End: 2019-03-15

## 2019-03-13 RX ORDER — OXYCODONE HYDROCHLORIDE 5 MG/1
10 TABLET ORAL EVERY 6 HOURS
Qty: 0 | Refills: 0 | Status: DISCONTINUED | OUTPATIENT
Start: 2019-03-13 | End: 2019-03-15

## 2019-03-13 RX ORDER — ACETAMINOPHEN 500 MG
650 TABLET ORAL EVERY 6 HOURS
Qty: 0 | Refills: 0 | Status: DISCONTINUED | OUTPATIENT
Start: 2019-03-13 | End: 2019-03-15

## 2019-03-13 RX ADMIN — Medication 10 MILLIGRAM(S): at 12:27

## 2019-03-13 RX ADMIN — Medication 10 MILLIGRAM(S): at 06:00

## 2019-03-13 RX ADMIN — DEXTROSE MONOHYDRATE, SODIUM CHLORIDE, AND POTASSIUM CHLORIDE 75 MILLILITER(S): 50; .745; 4.5 INJECTION, SOLUTION INTRAVENOUS at 12:27

## 2019-03-13 RX ADMIN — Medication 10 MILLIGRAM(S): at 00:55

## 2019-03-13 RX ADMIN — ENOXAPARIN SODIUM 40 MILLIGRAM(S): 100 INJECTION SUBCUTANEOUS at 12:28

## 2019-03-13 NOTE — PROGRESS NOTE ADULT - SUBJECTIVE AND OBJECTIVE BOX
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     SUBJECTIVE: Patient seen and examined at bedside with surgical team, patient without complaints. Patient is POD 8 s/p right hemicolectomy. Patient is recovering well and reports passing flatus and having 2-3 episodes of diarrhea yesterday. Patient has been voiding without difficulty. Patient denies pain, CP, SOB, nausea, vomiting, and bloating.     OBJECTIVE:  Vital Signs Last 24 Hrs  T(C): 36.6 (13 Mar 2019 05:55), Max: 37.2 (12 Mar 2019 14:40)  T(F): 97.8 (13 Mar 2019 05:55), Max: 98.9 (12 Mar 2019 14:40)  HR: 75 (13 Mar 2019 05:55) (72 - 88)  BP: 123/69 (13 Mar 2019 05:55) (106/64 - 132/69)  BP(mean): --  RR: 18 (13 Mar 2019 05:55) (18 - 18)  SpO2: 96% (13 Mar 2019 05:55) (94% - 98%)I&O's Detail    11 Mar 2019 07:01  -  12 Mar 2019 07:00  --------------------------------------------------------  IN:    dextrose 5% + sodium chloride 0.9% with potassium chloride 20 mEq/L: 750 mL    IV PiggyBack: 285 mL  Total IN: 1035 mL    OUT:    Nasoenteral Tube: 720 mL    Voided: 250 mL  Total OUT: 970 mL    Total NET: 65 mL    12 Mar 2019 07:01  -  13 Mar 2019 06:58  --------------------------------------------------------  IN:  Total IN: 0 mL    OUT:    Voided: 300 mL  Total OUT: 300 mL    Total NET: -300 mL    MEDICATIONS  (STANDING):  dextrose 5% + sodium chloride 0.9% with potassium chloride 20 mEq/L 1000 milliLiter(s) (75 mL/Hr) IV Continuous <Continuous>  enoxaparin Injectable 40 milliGRAM(s) SubCutaneous daily  metoclopramide Injectable 10 milliGRAM(s) IV Push every 6 hours    MEDICATIONS  (PRN):  benzocaine 15 mG/menthol 3.6 mG (Sugar-Free) Lozenge 1 Lozenge Oral three times a day PRN Sore Throat  HYDROmorphone  Injectable 0.5 milliGRAM(s) IV Push every 6 hours PRN Moderate Pain (4 - 6)  HYDROmorphone  Injectable 1 milliGRAM(s) IV Push every 6 hours PRN Severe Pain (7 - 10)  tetracaine/benzocaine/butamben Spray 1 Spray(s) Topical four times a day PRN throat irritation      Physical Exam  Constitutional: A&Ox3, NAD. NGT in place.  Respiratory: No evidence of respiratory distress. Breathing unlabored.   Gastrointestinal: Abdomen nondistended, soft, nontender. No rebound or guarding.     LABS:                        10.0   8.01  )-----------( 270      ( 12 Mar 2019 06:01 )             30.7     03-12    142  |  107  |  5<L>  ----------------------------<  104<H>  3.8   |  24  |  0.48<L>    Ca    8.9      12 Mar 2019 06:01  Phos  2.9     03-12  Mg     1.9     03-12      ASSESSMENT/ PLAN:  Patient is a 74y old Female  POD 8 s/p right hemicolectomy for mucinous adenocarcinoma of the appendix. Exhibiting signs of GI function.  -f/u AM labs  -NPO with NG Tube to LWS, consider removing today  - PRN pain control with IV tylenol  - Monitor GI function  - IVF  - IS/OOB/amb, PT  - strict I&O  - VTE ppx  - seen and discussed with A team    [id number]

## 2019-03-14 ENCOUNTER — TRANSCRIPTION ENCOUNTER (OUTPATIENT)
Age: 74
End: 2019-03-14

## 2019-03-14 LAB
ANION GAP SERPL CALC-SCNC: 17 MMO/L — HIGH (ref 7–14)
BUN SERPL-MCNC: 5 MG/DL — LOW (ref 7–23)
CALCIUM SERPL-MCNC: 9.2 MG/DL — SIGNIFICANT CHANGE UP (ref 8.4–10.5)
CHLORIDE SERPL-SCNC: 99 MMOL/L — SIGNIFICANT CHANGE UP (ref 98–107)
CO2 SERPL-SCNC: 23 MMOL/L — SIGNIFICANT CHANGE UP (ref 22–31)
CREAT SERPL-MCNC: 0.55 MG/DL — SIGNIFICANT CHANGE UP (ref 0.5–1.3)
GLUCOSE BLDC GLUCOMTR-MCNC: 119 MG/DL — HIGH (ref 70–99)
GLUCOSE SERPL-MCNC: 98 MG/DL — SIGNIFICANT CHANGE UP (ref 70–99)
HCT VFR BLD CALC: 31.4 % — LOW (ref 34.5–45)
HGB BLD-MCNC: 10.2 G/DL — LOW (ref 11.5–15.5)
MAGNESIUM SERPL-MCNC: 1.9 MG/DL — SIGNIFICANT CHANGE UP (ref 1.6–2.6)
MCHC RBC-ENTMCNC: 30.3 PG — SIGNIFICANT CHANGE UP (ref 27–34)
MCHC RBC-ENTMCNC: 32.5 % — SIGNIFICANT CHANGE UP (ref 32–36)
MCV RBC AUTO: 93.2 FL — SIGNIFICANT CHANGE UP (ref 80–100)
NRBC # FLD: 0 K/UL — LOW (ref 25–125)
PHOSPHATE SERPL-MCNC: 3.5 MG/DL — SIGNIFICANT CHANGE UP (ref 2.5–4.5)
PLATELET # BLD AUTO: 349 K/UL — SIGNIFICANT CHANGE UP (ref 150–400)
PMV BLD: 10.6 FL — SIGNIFICANT CHANGE UP (ref 7–13)
POTASSIUM SERPL-MCNC: 3.3 MMOL/L — LOW (ref 3.5–5.3)
POTASSIUM SERPL-SCNC: 3.3 MMOL/L — LOW (ref 3.5–5.3)
RBC # BLD: 3.37 M/UL — LOW (ref 3.8–5.2)
RBC # FLD: 11.9 % — SIGNIFICANT CHANGE UP (ref 10.3–14.5)
SODIUM SERPL-SCNC: 139 MMOL/L — SIGNIFICANT CHANGE UP (ref 135–145)
WBC # BLD: 7.65 K/UL — SIGNIFICANT CHANGE UP (ref 3.8–10.5)
WBC # FLD AUTO: 7.65 K/UL — SIGNIFICANT CHANGE UP (ref 3.8–10.5)

## 2019-03-14 RX ORDER — POTASSIUM CHLORIDE 20 MEQ
10 PACKET (EA) ORAL
Qty: 0 | Refills: 0 | Status: COMPLETED | OUTPATIENT
Start: 2019-03-14 | End: 2019-03-14

## 2019-03-14 RX ORDER — MAGNESIUM SULFATE 500 MG/ML
1 VIAL (ML) INJECTION ONCE
Qty: 0 | Refills: 0 | Status: COMPLETED | OUTPATIENT
Start: 2019-03-14 | End: 2019-03-14

## 2019-03-14 RX ADMIN — ENOXAPARIN SODIUM 40 MILLIGRAM(S): 100 INJECTION SUBCUTANEOUS at 12:08

## 2019-03-14 RX ADMIN — Medication 100 MILLIEQUIVALENT(S): at 12:08

## 2019-03-14 RX ADMIN — Medication 100 GRAM(S): at 10:43

## 2019-03-14 RX ADMIN — Medication 100 MILLIEQUIVALENT(S): at 17:40

## 2019-03-14 RX ADMIN — Medication 100 MILLIEQUIVALENT(S): at 14:53

## 2019-03-14 NOTE — DISCHARGE NOTE PROVIDER - NSDCCPCAREPLAN_GEN_ALL_CORE_FT
PRINCIPAL DISCHARGE DIAGNOSIS  Diagnosis: Malignant neoplasm of appendix  Assessment and Plan of Treatment: WOUND CARE:  Keep incisions, clean, dry and in tact.   BATHING: Please do not submerge wound underwater. You may shower and/or sponge bathe.  ACTIVITY: No heavy lifting or straining. Otherwise, you may return to your usual level of physical activity. If you are taking narcotic pain medication (such as Percocet) DO NOT drive a car, operate machinery or make important decisions.  DIET: Return to your usual diet.  NOTIFY YOUR SURGEON IF: You have any bleeding that does not stop, any pus draining from your wound(s), any fever (over 100.4 F) or chills, persistent nausea/vomiting, persistent diarrhea, or if your pain is not controlled on your discharge pain medications.  FOLLOW-UP: Please follow up with your primary care physician in one week regarding your hospitalization   Please call Dr. Mcgregor to make an appointment in 1 week [phone]

## 2019-03-14 NOTE — PROGRESS NOTE ADULT - ASSESSMENT
S/P laparoscopic right hemicolectomy  Pathology report: no residual tumor- all Lymph nodes= negative    Post=operative ileus vs partial SBO resolved as SBFT shows prompt flow of contrast material to the rectum.  We will advance diet as tolerated.  To have all staples removed from the suture lines.

## 2019-03-14 NOTE — DISCHARGE NOTE PROVIDER - HOSPITAL COURSE
74 year old female presents to presurgical testing with diagnosis of malignant neoplasm of appendix scheduled for laparoscopic right hemicolectomy for 2/22/19. Pt is s/p laparoscopic appendectomy on 1/4/19, pathology positive for carcinoma. Pt denies abdominal pain, had two episodes of diarrhea after eating oatmeal, but otherwise, no changes in bowel habits.         Patient taken to the operating room laparoscopic assisted right hemicolectomy. Patient tolerated procedure well and transferred to surgical floor. During hospital course patients diet was slowly advanced as tolerated.  At this time, pt is tolerating a regular diet, ambulating and voiding.  Pt is stable for discharge as per the attending at this time. 74 year old female presents to presurgical testing with diagnosis of malignant neoplasm of appendix scheduled for laparoscopic right hemicolectomy for 2/22/19. Pt is s/p laparoscopic appendectomy on 1/4/19, pathology positive for carcinoma. Pt denies abdominal pain, had two episodes of diarrhea after eating oatmeal, but otherwise, no changes in bowel habits.         Patient taken to the operating room laparoscopic assisted right hemicolectomy. Patient tolerated procedure well and transferred to surgical floor. During hospital course patients diet was slowly advanced as tolerated.  At this time, pt is tolerating a regular diet, ambulating and voiding.  Staples removed on post-op day. Pt is stable for discharge as per the attending at this time. 74 year old female presents to presurgical testing with diagnosis of malignant neoplasm of appendix scheduled for laparoscopic right hemicolectomy for 2/22/19. Pt is s/p laparoscopic appendectomy on 1/4/19, pathology positive for carcinoma. Pt denies abdominal pain, had two episodes of diarrhea after eating oatmeal, but otherwise, no changes in bowel habits.         Patient taken to the operating room laparoscopic assisted right hemicolectomy. Patient tolerated procedure well and transferred to surgical floor. During hospital course patients diet was slowly advanced as tolerated.  At this time, pt is tolerating a regular diet, ambulating and voiding.  Staples removed prior to discharge. Pt is stable for discharge as per the attending at this time.

## 2019-03-14 NOTE — DISCHARGE NOTE PROVIDER - NSDCFUADDAPPT_GEN_ALL_CORE_FT
Please call Dr. Mcgregor to make an appointment in 1 week [phone] Please call Dr. Mcgregor to make an appointment in 1 week [phone]  Please follow up with primary medical doctor within one week of discharge.

## 2019-03-14 NOTE — PROGRESS NOTE ADULT - REASON FOR ADMISSION
Appendiceal malignancy
Mucinous adenocarcinoma of the appendix
Appendiceal mucinous carcinoma
Carcinoma of the appendix
Mucinous adenocarcinoma of the appendix
Neoplasm of the appendix

## 2019-03-14 NOTE — PROGRESS NOTE ADULT - SUBJECTIVE AND OBJECTIVE BOX
GENERAL SURGERY DAILY PROGRESS NOTE:    Interval:  No acute events overnight.    Subjective:  Patient seen and examined. Reports pain is well controlled. Denies N/V. Tolerating clears, though oral intake bothers her throat. Passing flatus, +BM.    Vital Signs Last 24 Hrs  T(C): 36.7 (14 Mar 2019 10:32), Max: 37.1 (13 Mar 2019 17:07)  T(F): 98 (14 Mar 2019 10:32), Max: 98.7 (13 Mar 2019 17:07)  HR: 82 (14 Mar 2019 10:32) (72 - 83)  BP: 108/66 (14 Mar 2019 10:32) (102/70 - 128/67)  RR: 18 (14 Mar 2019 10:32) (17 - 18)  SpO2: 96% (14 Mar 2019 10:32) (95% - 99%)    Exam:  Gen: NAD, resting in bed, alert and responding appropriately  Resp: Airway patent, non-labored respirations  Abd: Soft, ND, NT, no rebound or guarding. Incision c/d/i  Ext: WWP      I&O's Detail    13 Mar 2019 07:01  -  14 Mar 2019 07:00  --------------------------------------------------------  IN:    dextrose 5% + sodium chloride 0.9% with potassium chloride 20 mEq/L: 150 mL    Oral Fluid: 240 mL  Total IN: 390 mL    OUT:    Voided: 900 mL  Total OUT: 900 mL    Total NET: -510 mL      MEDICATIONS  (STANDING):  enoxaparin Injectable 40 milliGRAM(s) SubCutaneous daily  magnesium sulfate  IVPB 1 Gram(s) IV Intermittent once  potassium chloride  10 mEq/100 mL IVPB 10 milliEquivalent(s) IV Intermittent every 1 hour    MEDICATIONS  (PRN):  acetaminophen   Tablet .. 650 milliGRAM(s) Oral every 6 hours PRN Mild Pain (1 - 3)  oxyCODONE    IR 5 milliGRAM(s) Oral every 6 hours PRN Moderate Pain (4 - 6)  oxyCODONE    IR 10 milliGRAM(s) Oral every 6 hours PRN Severe Pain (7 - 10)      LABS:                        10.2   7.65  )-----------( 349      ( 14 Mar 2019 05:23 )             31.4     03-14    139  |  99  |  5<L>  ----------------------------<  98  3.3<L>   |  23  |  0.55    Ca    9.2      14 Mar 2019 05:23  Phos  3.5     03-14  Mg     1.9     03-14

## 2019-03-14 NOTE — PROGRESS NOTE ADULT - ASSESSMENT
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

## 2019-03-14 NOTE — PROGRESS NOTE ADULT - SUBJECTIVE AND OBJECTIVE BOX
POD # 9        Subjective: I had several bowel movements.  I have no abdominal pain.     Objective:   Vital Signs Last 24 Hrs  T(C): 36.8 (14 Mar 2019 05:49), Max: 37.1 (13 Mar 2019 10:33)  T(F): 98.3 (14 Mar 2019 05:49), Max: 98.7 (13 Mar 2019 10:33)  HR: 72 (14 Mar 2019 05:49) (72 - 83)  BP: 128/67 (14 Mar 2019 05:49) (102/70 - 128/67)  BP(mean): --  RR: 17 (14 Mar 2019 05:49) (17 - 18)  SpO2: 97% (14 Mar 2019 05:49) (95% - 99%)    Daily     Daily     Heent: N/C, AT PER no scleral icterus, No JVD  Pul: clear  Cor: RRR  Abdomen: soft, normal BS. Wound - clean  Extremities: without edema, motor/sensory intact    I&O's Detail    13 Mar 2019 07:01  -  14 Mar 2019 07:00  --------------------------------------------------------  IN:    dextrose 5% + sodium chloride 0.9% with potassium chloride 20 mEq/L: 150 mL    Oral Fluid: 240 mL  Total IN: 390 mL    OUT:    Voided: 900 mL  Total OUT: 900 mL    Total NET: -510 mL          MEDICATIONS  (STANDING):  enoxaparin Injectable 40 milliGRAM(s) SubCutaneous daily    MEDICATIONS  (PRN):  acetaminophen   Tablet .. 650 milliGRAM(s) Oral every 6 hours PRN Mild Pain (1 - 3)  oxyCODONE    IR 5 milliGRAM(s) Oral every 6 hours PRN Moderate Pain (4 - 6)  oxyCODONE    IR 10 milliGRAM(s) Oral every 6 hours PRN Severe Pain (7 - 10)                            10.2   7.65  )-----------( 349      ( 14 Mar 2019 05:23 )             31.4     03-14    139  |  99  |  5<L>  ----------------------------<  98  3.3<L>   |  23  |  0.55    Ca    9.2      14 Mar 2019 05:23  Phos  3.5     03-14  Mg     1.9     03-14          Radiologic Studies:< from: Xray Small Bowel Series (03.12.19 @ 17:24) >  Immediately after the injection of contrast, contrast is seen within the   stomach. At 4 hours there is contrast within the small and large bowel.   No evidence of obstruction. There is no evidence of free air.    IMPRESSION:     No obstruction or free air.      < end of copied text >    Surgical Pathology Report (03.05.19 @ 13:16)    Surgical Pathology Report:   ACCESSION No:  [id number]    VENITA DELGADO                     2        Surgical Final Report          Final Diagnosis  1-Right colon, resection:  - Focal adenoma (0.2 x 0.2 x 0.2 cm) at the prior  appendectomy site changes.  - No mucin or invasion identified.  - Twenty nine lymph nodes are negative for tumor (N0).  - Resection margins are free of tumor.    Verified by: Paulo Castellanos M.D.  (Electronic Signature)  Reported on: 03/13/19 12:42 EDT, [address] [place] [address]  _________________________________________________________________    Clinical History  S/P appendectomy for mucinous adenocarcinoma in the appendix    Specimen(s) Submitted  1-Right colon    Gross Description    The specimen is received in formalin and the specimen container  is labeled: Right colon.  It consists of a 4.0 cm terminal ileal  segment with an closed proximal margin, 4.5 cm in circumference,  inked blue, that surrounded by a 3.0 cm ileum mesentery.  The  ileum is contiguous with the cecum and a 14.0 cm portion of  ascending colon, the distal margin of which is closed, 5.5 cm in  open circumference, inked black, that surrounded x 5.5 cm  mesocolon. The cecum shows a 1.5 x 1.5 x 0.9 cm nodular area at  the appendiceal orifice.  It approaches the serosal surface of  cecum without puckering, inked green. Photographs taken.  It lies  2.0 cm below the ileocecal valve, and 8.0 cm from the pedicle  (inked orange). The adjacent mucosa is grossly unremarkable.  The  pericolic fat contains 28 lymph nodes the ranging in size from  0.1 cm to 0.4 cm in greatest dimension.  Representative sections  submitted as follows: 1A = proximal margin, perpendicular  section; 1B = distal margin, perpendicular section; 1C= pedicle  with lymph node, shave section; 1D-1F= nodular area, entirely;  1G= ileocecal valve above the lesional area, perpendicular  section; 1H= three lymph nodes; 1I= four lymph nodes; 1J= four  lymph nodes; 1K= four lymph nodes; 1L= four lymph nodes; 1M= four  lymph nodes; 1N= onebisected lymph node; 1O= three lymph nodes.  Total cassettes-15    In addition to other data that may appear on the specimen  container, the label has been inspected to confirm the              VENITA DELGADO 2        Surgical Final Report          presence of the patient's name and date of birth.  TK 03/10/2019 18:37

## 2019-03-14 NOTE — DISCHARGE NOTE PROVIDER - CARE PROVIDER_API CALL
Brooks Mcgregor)  Surgery  [address] [place] [address], [address]  [place] [address]  Phone: [phone]  Fax: [phone]  Follow Up Time:

## 2019-03-14 NOTE — PROGRESS NOTE ADULT - NSHPATTENDINGPLANDISCUSS_GEN_ALL_CORE
Dr. Hon Henry
Dr. Baker
Dr. Cesar Fishman and Dr. Hon Henry
Dr. Hon Henry.

## 2019-03-15 ENCOUNTER — TRANSCRIPTION ENCOUNTER (OUTPATIENT)
Age: 74
End: 2019-03-15

## 2019-03-15 VITALS
HEART RATE: 88 BPM | TEMPERATURE: 98 F | DIASTOLIC BLOOD PRESSURE: 67 MMHG | OXYGEN SATURATION: 96 % | SYSTOLIC BLOOD PRESSURE: 109 MMHG | RESPIRATION RATE: 18 BRPM

## 2019-03-15 LAB
ANION GAP SERPL CALC-SCNC: 13 MMO/L — SIGNIFICANT CHANGE UP (ref 7–14)
BUN SERPL-MCNC: 6 MG/DL — LOW (ref 7–23)
CALCIUM SERPL-MCNC: 9 MG/DL — SIGNIFICANT CHANGE UP (ref 8.4–10.5)
CHLORIDE SERPL-SCNC: 100 MMOL/L — SIGNIFICANT CHANGE UP (ref 98–107)
CO2 SERPL-SCNC: 24 MMOL/L — SIGNIFICANT CHANGE UP (ref 22–31)
CREAT SERPL-MCNC: 0.54 MG/DL — SIGNIFICANT CHANGE UP (ref 0.5–1.3)
GLUCOSE SERPL-MCNC: 102 MG/DL — HIGH (ref 70–99)
HCT VFR BLD CALC: 30.3 % — LOW (ref 34.5–45)
HGB BLD-MCNC: 9.9 G/DL — LOW (ref 11.5–15.5)
MAGNESIUM SERPL-MCNC: 2 MG/DL — SIGNIFICANT CHANGE UP (ref 1.6–2.6)
MCHC RBC-ENTMCNC: 30.1 PG — SIGNIFICANT CHANGE UP (ref 27–34)
MCHC RBC-ENTMCNC: 32.7 % — SIGNIFICANT CHANGE UP (ref 32–36)
MCV RBC AUTO: 92.1 FL — SIGNIFICANT CHANGE UP (ref 80–100)
NRBC # FLD: 0 K/UL — LOW (ref 25–125)
PHOSPHATE SERPL-MCNC: 3.3 MG/DL — SIGNIFICANT CHANGE UP (ref 2.5–4.5)
PLATELET # BLD AUTO: 325 K/UL — SIGNIFICANT CHANGE UP (ref 150–400)
PMV BLD: 10 FL — SIGNIFICANT CHANGE UP (ref 7–13)
POTASSIUM SERPL-MCNC: 3.9 MMOL/L — SIGNIFICANT CHANGE UP (ref 3.5–5.3)
POTASSIUM SERPL-SCNC: 3.9 MMOL/L — SIGNIFICANT CHANGE UP (ref 3.5–5.3)
RBC # BLD: 3.29 M/UL — LOW (ref 3.8–5.2)
RBC # FLD: 11.9 % — SIGNIFICANT CHANGE UP (ref 10.3–14.5)
SODIUM SERPL-SCNC: 137 MMOL/L — SIGNIFICANT CHANGE UP (ref 135–145)
WBC # BLD: 7.68 K/UL — SIGNIFICANT CHANGE UP (ref 3.8–10.5)
WBC # FLD AUTO: 7.68 K/UL — SIGNIFICANT CHANGE UP (ref 3.8–10.5)

## 2019-03-15 RX ORDER — ACETAMINOPHEN 500 MG
2 TABLET ORAL
Qty: 0 | Refills: 0 | DISCHARGE
Start: 2019-03-15

## 2019-03-15 RX ORDER — OXYCODONE HYDROCHLORIDE 5 MG/1
1 TABLET ORAL
Qty: 8 | Refills: 0 | OUTPATIENT
Start: 2019-03-15 | End: 2019-03-16

## 2019-03-15 RX ORDER — OXYCODONE HYDROCHLORIDE 5 MG/1
1 TABLET ORAL
Qty: 0 | Refills: 0 | COMMUNITY
Start: 2019-03-15

## 2019-03-15 RX ORDER — OXYCODONE HYDROCHLORIDE 5 MG/1
1 TABLET ORAL
Qty: 8 | Refills: 0
Start: 2019-03-15 | End: 2019-03-16

## 2019-03-15 RX ADMIN — ENOXAPARIN SODIUM 40 MILLIGRAM(S): 100 INJECTION SUBCUTANEOUS at 12:09

## 2019-03-15 NOTE — PROGRESS NOTE ADULT - PROVIDER SPECIALTY LIST ADULT
Anesthesia
Pain Medicine
Pain Medicine
Surgery

## 2019-03-15 NOTE — DISCHARGE NOTE NURSING/CASE MANAGEMENT/SOCIAL WORK - NSDCPEEMAIL_GEN_ALL_CORE
[facility] email [email or web address]

## 2019-03-15 NOTE — PROGRESS NOTE ADULT - SUBJECTIVE AND OBJECTIVE BOX
GENERAL SURGERY DAILY PROGRESS NOTE:    Interval:  No acute events overnight.    Subjective:  Patient seen and examined. Reports pain is well controlled. Denies N/V. Tolerating diet. Passing flatus, +BM.    Vital Signs Last 24 Hrs  T(C): 36.8 (14 Mar 2019 21:19), Max: 36.8 (14 Mar 2019 05:49)  T(F): 98.3 (14 Mar 2019 21:19), Max: 98.3 (14 Mar 2019 05:49)  HR: 81 (14 Mar 2019 21:19) (72 - 90)  BP: 96/61 (14 Mar 2019 21:19) (96/61 - 128/67)  RR: 16 (14 Mar 2019 21:19) (16 - 22)  SpO2: 96% (14 Mar 2019 21:19) (92% - 97%)    Exam:  Gen: NAD, resting in bed, alert and responding appropriately  Resp: Airway patent, non-labored respirations  Abd: Soft, ND, NT, incision healing c/d/i  Ext: WWP      I&O's Detail    13 Mar 2019 07:01  -  14 Mar 2019 07:00  --------------------------------------------------------  IN:    dextrose 5% + sodium chloride 0.9% with potassium chloride 20 mEq/L: 150 mL    Oral Fluid: 240 mL  Total IN: 390 mL    OUT:    Voided: 900 mL  Total OUT: 900 mL    Total NET: -510 mL    MEDICATIONS  (STANDING):  enoxaparin Injectable 40 milliGRAM(s) SubCutaneous daily    MEDICATIONS  (PRN):  acetaminophen   Tablet .. 650 milliGRAM(s) Oral every 6 hours PRN Mild Pain (1 - 3)  oxyCODONE    IR 5 milliGRAM(s) Oral every 6 hours PRN Moderate Pain (4 - 6)  oxyCODONE    IR 10 milliGRAM(s) Oral every 6 hours PRN Severe Pain (7 - 10)      LABS:                        10.2   7.65  )-----------( 349      ( 14 Mar 2019 05:23 )             31.4     03-14    139  |  99  |  5<L>  ----------------------------<  98  3.3<L>   |  23  |  0.55    Ca    9.2      14 Mar 2019 05:23  Phos  3.5     03-14  Mg     1.9     03-14

## 2019-03-15 NOTE — PROGRESS NOTE ADULT - ASSESSMENT
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

## 2019-03-15 NOTE — DISCHARGE NOTE NURSING/CASE MANAGEMENT/SOCIAL WORK - NSDCPNINST_GEN_ALL_CORE
Instructed patient to monitor incision site for redness, swelling, pain and drainage.  Patient verbalized understanding.

## 2019-03-15 NOTE — DISCHARGE NOTE NURSING/CASE MANAGEMENT/SOCIAL WORK - NSDCPEWEB_GEN_ALL_CORE
[place] website --- [email or web address]/[facility] for Tobacco Control website --- [email or web address]

## 2019-03-15 NOTE — DISCHARGE NOTE NURSING/CASE MANAGEMENT/SOCIAL WORK - NSDCDPATPORTLINK_GEN_ALL_CORE
You can access the [facility] Patient Portal, offered by [facility], by registering with the following website: [email or web address]

## 2019-03-15 NOTE — DISCHARGE NOTE NURSING/CASE MANAGEMENT/SOCIAL WORK - NSDCFUADDAPPT_GEN_ALL_CORE_FT
Please call Dr. Mcgregor to make an appointment in 1 week [phone]  Please follow up with primary medical doctor within one week of discharge.

## 2019-05-23 ENCOUNTER — APPOINTMENT (OUTPATIENT)
Dept: OPHTHALMOLOGY | Facility: CLINIC | Age: 74
End: 2019-05-23
Payer: MEDICARE

## 2019-05-23 ENCOUNTER — NON-APPOINTMENT (OUTPATIENT)
Age: 74
End: 2019-05-23

## 2019-05-23 PROBLEM — M19.90 UNSPECIFIED OSTEOARTHRITIS, UNSPECIFIED SITE: Chronic | Status: ACTIVE | Noted: 2019-02-08

## 2019-05-23 PROCEDURE — 92020 GONIOSCOPY: CPT

## 2019-05-23 PROCEDURE — 76514 ECHO EXAM OF EYE THICKNESS: CPT

## 2019-05-23 PROCEDURE — 92014 COMPRE OPH EXAM EST PT 1/>: CPT

## 2019-05-23 PROCEDURE — 92250 FUNDUS PHOTOGRAPHY W/I&R: CPT

## 2019-05-23 PROCEDURE — 92083 EXTENDED VISUAL FIELD XM: CPT

## 2019-09-27 NOTE — PATIENT PROFILE ADULT - NSPROEXTENSIONSOFSELF_GEN_A_NUR
Encounter addended by: Lor Carbajal, PT on: 9/27/2019 3:23 PM   Actions taken: Clinical Note Signed, Episode resolved eyeglasses

## 2019-10-24 ENCOUNTER — NON-APPOINTMENT (OUTPATIENT)
Age: 74
End: 2019-10-24

## 2019-10-24 ENCOUNTER — APPOINTMENT (OUTPATIENT)
Dept: OPHTHALMOLOGY | Facility: CLINIC | Age: 74
End: 2019-10-24
Payer: MEDICARE

## 2019-10-24 PROCEDURE — 92014 COMPRE OPH EXAM EST PT 1/>: CPT

## 2019-10-24 PROCEDURE — 92083 EXTENDED VISUAL FIELD XM: CPT

## 2019-10-24 PROCEDURE — 92133 CPTRZD OPH DX IMG PST SGM ON: CPT

## 2020-03-26 ENCOUNTER — APPOINTMENT (OUTPATIENT)
Dept: OPHTHALMOLOGY | Facility: CLINIC | Age: 75
End: 2020-03-26

## 2020-09-16 ENCOUNTER — APPOINTMENT (OUTPATIENT)
Dept: OPHTHALMOLOGY | Facility: CLINIC | Age: 75
End: 2020-09-16
Payer: MEDICARE

## 2020-09-16 ENCOUNTER — NON-APPOINTMENT (OUTPATIENT)
Age: 75
End: 2020-09-16

## 2020-09-16 PROCEDURE — 92250 FUNDUS PHOTOGRAPHY W/I&R: CPT

## 2020-09-16 PROCEDURE — 92020 GONIOSCOPY: CPT

## 2020-09-16 PROCEDURE — 92014 COMPRE OPH EXAM EST PT 1/>: CPT

## 2020-09-16 PROCEDURE — 92083 EXTENDED VISUAL FIELD XM: CPT

## 2020-11-06 ENCOUNTER — NON-APPOINTMENT (OUTPATIENT)
Age: 75
End: 2020-11-06

## 2020-11-06 ENCOUNTER — APPOINTMENT (OUTPATIENT)
Dept: OPHTHALMOLOGY | Facility: CLINIC | Age: 75
End: 2020-11-06
Payer: MEDICARE

## 2020-11-06 PROCEDURE — 99072 ADDL SUPL MATRL&STAF TM PHE: CPT

## 2020-11-06 PROCEDURE — 92014 COMPRE OPH EXAM EST PT 1/>: CPT

## 2021-01-04 ENCOUNTER — APPOINTMENT (OUTPATIENT)
Dept: OPHTHALMOLOGY | Facility: CLINIC | Age: 76
End: 2021-01-04

## 2021-03-31 ENCOUNTER — APPOINTMENT (OUTPATIENT)
Dept: OPHTHALMOLOGY | Facility: CLINIC | Age: 76
End: 2021-03-31
Payer: MEDICARE

## 2021-03-31 ENCOUNTER — NON-APPOINTMENT (OUTPATIENT)
Age: 76
End: 2021-03-31

## 2021-03-31 PROCEDURE — 92083 EXTENDED VISUAL FIELD XM: CPT

## 2021-03-31 PROCEDURE — 99213 OFFICE O/P EST LOW 20 MIN: CPT

## 2021-03-31 PROCEDURE — 92133 CPTRZD OPH DX IMG PST SGM ON: CPT

## 2021-03-31 PROCEDURE — 99072 ADDL SUPL MATRL&STAF TM PHE: CPT

## 2021-05-05 NOTE — H&P PST ADULT - TOBACCO USAGE DEFINITIONS
START Repaglinide 0.5mg before dinner.     While on Dexcom: If blood sugar is below 80 or above 200, confirm with finger stick. Do not consume more than 500mg of Vitamin C per day.     Follow up in 6 weeks with Dexcom.     -Anytime you feel \"low\"    Call Nima Go MD office Dept: [phone] if your blood sugar goes below 70 or if it is above 300 and not coming down.    Bring in your blood sugar readings or glucose meter to every appointment with Nima Go MD.    
.

## 2021-05-07 ENCOUNTER — APPOINTMENT (OUTPATIENT)
Dept: OPHTHALMOLOGY | Facility: CLINIC | Age: 76
End: 2021-05-07

## 2021-06-02 ENCOUNTER — NON-APPOINTMENT (OUTPATIENT)
Age: 76
End: 2021-06-02

## 2021-06-02 ENCOUNTER — APPOINTMENT (OUTPATIENT)
Dept: OPHTHALMOLOGY | Facility: CLINIC | Age: 76
End: 2021-06-02
Payer: MEDICARE

## 2021-06-02 PROCEDURE — 99072 ADDL SUPL MATRL&STAF TM PHE: CPT

## 2021-06-02 PROCEDURE — 92012 INTRM OPH EXAM EST PATIENT: CPT

## 2021-07-06 NOTE — H&P PST ADULT - PRESSURE ULCER(S)
PROBLEM VISIT     Date of service: 2021    Keysha Barker  Is a 25 y.o. single female    PT's PCP is: Keren Lujan MD     : 1996                                             Subjective:       Patient's last menstrual period was 2021 (exact date). OB History    Para Term  AB Living   0 0 0 0 0 0   SAB TAB Ectopic Molar Multiple Live Births   0 0 0 0 0 0        Social History     Tobacco Use   Smoking Status Never Smoker   Smokeless Tobacco Never Used        Social History     Substance and Sexual Activity   Alcohol Use Yes    Comment: social       Allergies: Patient has no known allergies. Current Outpatient Medications:     norgestimate-ethinyl estradiol (ORTHO-CYCLEN, 28,) 0.25-35 MG-MCG per tablet, Take 1 tablet by mouth daily, Disp: 3 packet, Rfl: 3    Social History     Substance and Sexual Activity   Sexual Activity Not Currently    Partners: Male       Last Yearly:      Last pap: 21 UNSAT    Last HPV: never     Have you had a positive covid test: No    Have you had the covid immunization: No    Chief Complaint   Patient presents with   [facility] Other     pt presents here today for a repeat pap. last pap 21 UNSAT. pt states no issues or concerns     Exposure to STD     pt needs GENIE. previously treated. PE:  Vital Signs  Blood pressure 122/80, height 5' 7\" (1.702 m), weight 249 lb (112.9 kg), last menstrual period 2021, not currently breastfeeding. Estimated body mass index is 39 kg/m² as calculated from the following:    Height as of this encounter: 5' 7\" (1.702 m). Weight as of this encounter: 249 lb (112.9 kg). No data recorded    NURSE: LOU    HPI: Patient here today for follow-up chlamydia and unsatisfactory Pap. Patient states she is very sad that she was in a situation and she did have a discussion with him and they are not together anymore.     Yes PT denies fever, chills, nausea and vomiting       Objective     Pelvic Exam:
no

## 2021-09-29 ENCOUNTER — NON-APPOINTMENT (OUTPATIENT)
Age: 76
End: 2021-09-29

## 2021-09-29 ENCOUNTER — APPOINTMENT (OUTPATIENT)
Dept: OPHTHALMOLOGY | Facility: CLINIC | Age: 76
End: 2021-09-29
Payer: MEDICARE

## 2021-09-29 PROCEDURE — 92020 GONIOSCOPY: CPT

## 2021-09-29 PROCEDURE — 92250 FUNDUS PHOTOGRAPHY W/I&R: CPT

## 2021-09-29 PROCEDURE — 92014 COMPRE OPH EXAM EST PT 1/>: CPT

## 2021-09-29 PROCEDURE — 92083 EXTENDED VISUAL FIELD XM: CPT

## 2021-10-20 ENCOUNTER — LABORATORY RESULT (OUTPATIENT)
Age: 76
End: 2021-10-20

## 2021-10-20 ENCOUNTER — APPOINTMENT (OUTPATIENT)
Dept: GASTROENTEROLOGY | Facility: CLINIC | Age: 76
End: 2021-10-20
Payer: MEDICARE

## 2021-10-20 VITALS
WEIGHT: 124 LBS | SYSTOLIC BLOOD PRESSURE: 140 MMHG | TEMPERATURE: 97.6 F | DIASTOLIC BLOOD PRESSURE: 80 MMHG | HEIGHT: 59 IN | BODY MASS INDEX: 25 KG/M2

## 2021-10-20 DIAGNOSIS — Z63.5 DISRUPTION OF FAMILY BY SEPARATION AND DIVORCE: ICD-10-CM

## 2021-10-20 DIAGNOSIS — Z78.9 OTHER SPECIFIED HEALTH STATUS: ICD-10-CM

## 2021-10-20 PROCEDURE — 99204 OFFICE O/P NEW MOD 45 MIN: CPT

## 2021-10-20 SDOH — SOCIAL STABILITY - SOCIAL INSECURITY: DISRUPTION OF FAMILY BY SEPARATION AND DIVORCE: Z63.5

## 2021-10-20 NOTE — PHYSICAL EXAM
[General Appearance - Alert] : alert [General Appearance - In No Acute Distress] : in no acute distress [Sclera] : the sclera and conjunctiva were normal [PERRL With Normal Accommodation] : pupils were equal in size, round, and reactive to light [Extraocular Movements] : extraocular movements were intact [Outer Ear] : the ears and nose were normal in appearance [Neck Appearance] : the appearance of the neck was normal [Oropharynx] : the oropharynx was normal [Neck Cervical Mass (___cm)] : no neck mass was observed [Jugular Venous Distention Increased] : there was no jugular-venous distention [Thyroid Diffuse Enlargement] : the thyroid was not enlarged [Thyroid Nodule] : there were no palpable thyroid nodules [Auscultation Breath Sounds / Voice Sounds] : lungs were clear to auscultation bilaterally [Heart Rate And Rhythm] : heart rate was normal and rhythm regular [Heart Sounds] : normal S1 and S2 [Heart Sounds Gallop] : no gallops [Murmurs] : no murmurs [Heart Sounds Pericardial Friction Rub] : no pericardial rub [Bowel Sounds] : normal bowel sounds [Abdomen Soft] : soft [Abdomen Tenderness] : non-tender [Abdomen Mass (___ Cm)] : no abdominal mass palpated [Abnormal Walk] : normal gait [Nail Clubbing] : no clubbing  or cyanosis of the fingernails [Musculoskeletal - Swelling] : no joint swelling seen [Motor Tone] : muscle strength and tone were normal [Skin Color & Pigmentation] : normal skin color and pigmentation [Skin Turgor] : normal skin turgor [] : no rash [Deep Tendon Reflexes (DTR)] : deep tendon reflexes were 2+ and symmetric [Sensation] : the sensory exam was normal to light touch and pinprick [No Focal Deficits] : no focal deficits

## 2021-10-20 NOTE — ASSESSMENT
[FreeTextEntry1] : Rule out celiac sprue as etiology of her diarrhea\par Rule out recurrent colonic lesion or inflammation in the colon\par Labs were drawn\par \par VENITA DELGADO was advised to undergo colonoscopy to which she agreed. The procedure will be performed in [facility] \par [facility] with the assistance of an anesthesiologist. The patient was given a Suprep preparation prescription and understood the \par procedure as it was explained to her. She was given a booklet distributed by the American Society of Gastrointestinal\par  Endoscopy explaining the procedure in detail and she understood the risks of the procedure not limited to infection, bleeding,\par perforation or non- diagnosis of colorectal cancer. She was advised that she could not drive home, if she chooses to\par  receive sedation.\par \par Further diagnostic and treatment recommendations will be based upon the procedure and any biopsies, if they are taken.\par \par Thank you for allowing me to participate in this [facility] health care.\par \par , Best personal regards -- Don\par

## 2021-10-20 NOTE — CONSULT LETTER
[Dear  ___] : Dear  [unfilled], [Consult Letter:] : I had the pleasure of evaluating your patient, [unfilled]. [( Thank you for referring [unfilled] for consultation for _____ )] : Thank you for referring [unfilled] for consultation for [unfilled] [Please see my note below.] : Please see my note below. [Consult Closing:] : Thank you very much for allowing me to participate in the care of this patient.  If you have any questions, please do not hesitate to contact me. [Sincerely,] : Sincerely, [FreeTextEntry3] : don\par \par George Barboza MD\par

## 2021-10-20 NOTE — HISTORY OF PRESENT ILLNESS
[FreeTextEntry1] : She is a 76-year-old female with 2 episodes of explosive diarrhea.  She has a past history of colon cancer resected in 2019.  Her first episode she had a lot of  lactose-containing foods that she feels contributed to her diarrhea.  Since then ,she has stopped lactose-containing foods.  Recently she had an episode of diarrhea on Sunday while she was in [place].  She denies abdominal pain, rectal bleeding or weight loss.  Since Sunday, her bowel movements have been normal Monday Tuesday and  Wednesday.  She denies a history of antibiotic use.

## 2021-10-21 LAB
ALBUMIN SERPL ELPH-MCNC: 4.6 G/DL
ALP BLD-CCNC: 90 U/L
ALT SERPL-CCNC: 17 U/L
ANION GAP SERPL CALC-SCNC: 19 MMOL/L
AST SERPL-CCNC: 25 U/L
BASOPHILS # BLD AUTO: 0.02 K/UL
BASOPHILS NFR BLD AUTO: 0.3 %
BILIRUB SERPL-MCNC: 0.3 MG/DL
BUN SERPL-MCNC: 9 MG/DL
CALCIUM SERPL-MCNC: 9.4 MG/DL
CHLORIDE SERPL-SCNC: 104 MMOL/L
CO2 SERPL-SCNC: 18 MMOL/L
CREAT SERPL-MCNC: 0.61 MG/DL
ENDOMYSIUM IGA SER QL: NEGATIVE
ENDOMYSIUM IGA TITR SER: NORMAL
EOSINOPHIL # BLD AUTO: 0.04 K/UL
EOSINOPHIL NFR BLD AUTO: 0.6 %
GLIADIN IGA SER QL: <5 UNITS
GLIADIN IGG SER QL: <5 UNITS
GLIADIN PEPTIDE IGA SER-ACNC: NEGATIVE
GLIADIN PEPTIDE IGG SER-ACNC: NEGATIVE
GLUCOSE SERPL-MCNC: 98 MG/DL
HCT VFR BLD CALC: 39.8 %
HGB BLD-MCNC: 13 G/DL
IMM GRANULOCYTES NFR BLD AUTO: 0.1 %
LYMPHOCYTES # BLD AUTO: 2.09 K/UL
LYMPHOCYTES NFR BLD AUTO: 29.8 %
MAN DIFF?: NORMAL
MCHC RBC-ENTMCNC: 31.3 PG
MCHC RBC-ENTMCNC: 32.7 GM/DL
MCV RBC AUTO: 95.9 FL
MONOCYTES # BLD AUTO: 0.52 K/UL
MONOCYTES NFR BLD AUTO: 7.4 %
NEUTROPHILS # BLD AUTO: 4.33 K/UL
NEUTROPHILS NFR BLD AUTO: 61.8 %
PLATELET # BLD AUTO: 270 K/UL
POTASSIUM SERPL-SCNC: 3.8 MMOL/L
PROT SERPL-MCNC: 7.5 G/DL
RBC # BLD: 4.15 M/UL
RBC # FLD: 11.8 %
SODIUM SERPL-SCNC: 141 MMOL/L
T3RU NFR SERPL: 0.9 TBI
T4 FREE SERPL-MCNC: 1.3 NG/DL
TSH SERPL-ACNC: 1.03 UIU/ML
TTG IGA SER IA-ACNC: 1.2 U/ML
TTG IGA SER-ACNC: NEGATIVE
TTG IGG SER IA-ACNC: <1.2 U/ML
TTG IGG SER IA-ACNC: NEGATIVE
WBC # FLD AUTO: 7.01 K/UL

## 2021-10-24 LAB — CEA SERPL-MCNC: 1.8 NG/ML

## 2021-11-30 ENCOUNTER — RESULT REVIEW (OUTPATIENT)
Age: 76
End: 2021-11-30

## 2021-11-30 ENCOUNTER — APPOINTMENT (OUTPATIENT)
Dept: GASTROENTEROLOGY | Facility: AMBULATORY SURGERY CENTER | Age: 76
End: 2021-11-30
Payer: MEDICARE

## 2021-11-30 PROCEDURE — 45380 COLONOSCOPY AND BIOPSY: CPT | Mod: 52

## 2021-12-20 ENCOUNTER — APPOINTMENT (OUTPATIENT)
Dept: GASTROENTEROLOGY | Facility: CLINIC | Age: 76
End: 2021-12-20
Payer: MEDICARE

## 2021-12-20 VITALS — WEIGHT: 123 LBS | BODY MASS INDEX: 24.8 KG/M2 | HEIGHT: 59 IN

## 2021-12-20 DIAGNOSIS — K63.5 POLYP OF COLON: ICD-10-CM

## 2021-12-20 DIAGNOSIS — C18.9 MALIGNANT NEOPLASM OF COLON, UNSPECIFIED: ICD-10-CM

## 2021-12-20 DIAGNOSIS — R19.7 DIARRHEA, UNSPECIFIED: ICD-10-CM

## 2021-12-20 PROCEDURE — 99214 OFFICE O/P EST MOD 30 MIN: CPT

## 2021-12-20 NOTE — CONSULT LETTER
[Dear  ___] : Dear  [unfilled], [Consult Letter:] : I had the pleasure of evaluating your patient, [unfilled]. [( Thank you for referring [unfilled] for consultation for _____ )] : Thank you for referring [unfilled] for consultation for [unfilled] [Please see my note below.] : Please see my note below. [Consult Closing:] : Thank you very much for allowing me to participate in the care of this patient.  If you have any questions, please do not hesitate to contact me. [Sincerely,] : Sincerely, [FreeTextEntry3] : Don\par \par George Barboza MD\par \par Gastroenterology\par [facility]\par [facility]\par \par

## 2021-12-20 NOTE — HISTORY OF PRESENT ILLNESS
[FreeTextEntry1] : Colonoscopy revealed normal mucosa and a right hemicolectomy.  3 small polyps were removed which were benign.  Laboratory data for hypothyroidism and celiac sprue were negative.  She is feeling well and only had one episode of diarrhea for which she took Imodium which resolved her diarrhea.  She has not had diarrhea for a while and is feeling great without any complaints

## 2021-12-30 ENCOUNTER — APPOINTMENT (OUTPATIENT)
Dept: OPHTHALMOLOGY | Facility: CLINIC | Age: 76
End: 2021-12-30

## 2022-03-16 ENCOUNTER — NON-APPOINTMENT (OUTPATIENT)
Age: 77
End: 2022-03-16

## 2022-03-16 ENCOUNTER — APPOINTMENT (OUTPATIENT)
Dept: OPHTHALMOLOGY | Facility: CLINIC | Age: 77
End: 2022-03-16
Payer: MEDICARE

## 2022-03-16 PROCEDURE — 92083 EXTENDED VISUAL FIELD XM: CPT

## 2022-03-16 PROCEDURE — 99213 OFFICE O/P EST LOW 20 MIN: CPT

## 2022-03-16 PROCEDURE — 92133 CPTRZD OPH DX IMG PST SGM ON: CPT

## 2022-08-24 ENCOUNTER — NON-APPOINTMENT (OUTPATIENT)
Age: 77
End: 2022-08-24

## 2022-08-24 ENCOUNTER — APPOINTMENT (OUTPATIENT)
Dept: OPHTHALMOLOGY | Facility: CLINIC | Age: 77
End: 2022-08-24

## 2022-08-24 PROCEDURE — 92250 FUNDUS PHOTOGRAPHY W/I&R: CPT

## 2022-08-24 PROCEDURE — 92014 COMPRE OPH EXAM EST PT 1/>: CPT

## 2022-08-24 PROCEDURE — 92020 GONIOSCOPY: CPT

## 2023-02-01 ENCOUNTER — APPOINTMENT (OUTPATIENT)
Dept: ORTHOPEDIC SURGERY | Facility: CLINIC | Age: 78
End: 2023-02-01
Payer: MEDICARE

## 2023-02-01 VITALS
HEIGHT: 59 IN | HEART RATE: 77 BPM | BODY MASS INDEX: 26.21 KG/M2 | OXYGEN SATURATION: 97 % | SYSTOLIC BLOOD PRESSURE: 108 MMHG | WEIGHT: 130 LBS | DIASTOLIC BLOOD PRESSURE: 70 MMHG | TEMPERATURE: 97.3 F

## 2023-02-01 PROCEDURE — 99203 OFFICE O/P NEW LOW 30 MIN: CPT

## 2023-02-01 NOTE — PHYSICAL EXAM
[Antalgic] : antalgic [Stooped] : stooped [de-identified] : Examination of the lumbar spine reveals no midline tenderness palpation, step-offs, or skin lesions. Decreased range of motion with respect to flexion, extension, lateral bending, and rotation. No tenderness to palpation of the sciatic notch. No tenderness palpation of the bilateral greater trochanters. No pain with passive internal/external rotation of the hips. No instability of bilateral lower extremities.  Negative JUSTINE. Negative straight leg raise bilaterally. No bowstring. Negative femoral stretch. 5 out of 5 iliopsoas, hip abductors, hips adductors, quadriceps, hamstrings, gastrocsoleus, tibialis anterior, extensor hallucis longus, peroneals. Grossly intact sensation to light touch bilateral lower extremities. 1+ patellar and Achilles reflexes. Downgoing Babinski. No clonus. Intact proprioception. Palpable pulses. No skin lesion and no edema on the right and left lower extremities. [de-identified] : AP lateral lumbar x-rays reviewed by me today reveals multilevel spondylosis.  She may have some trace L4-5 spondylolisthesis.

## 2023-02-01 NOTE — DISCUSSION/SUMMARY
[de-identified] : Given her worsening symptoms of neurogenic claudication I recommended a lumbar spine MRI.  She has tried a home exercise program and medications.  Follow-up afterwards.

## 2023-02-01 NOTE — HISTORY OF PRESENT ILLNESS
[de-identified] : Ms. VENITA DELGADO  is a 78 year old female who presents with bilateral leg pain in buttocks, hamstrings, and calves since the summer which has gotten worse since November.  She has taken steroids and gabapentin without any relief.  Walking and standing are difficult.  She has tried PT but was unable to do it secondary to pain.  She does a modified HEP.   Denies back pain.  Normal bowel and bladder control.   Denies any recent fevers, chills, sweats, weight loss, or infection.\par \par The patients past medical history, past surgical history, medications, allergies, and social history were reviewed by me today with the patient and documented accordingly.  In addition, the patient's family history, which is noncontributory to their visit, was also reviewed.\par

## 2023-02-03 ENCOUNTER — APPOINTMENT (OUTPATIENT)
Dept: ORTHOPEDIC SURGERY | Facility: CLINIC | Age: 78
End: 2023-02-03

## 2023-02-06 ENCOUNTER — APPOINTMENT (OUTPATIENT)
Dept: MRI IMAGING | Facility: IMAGING CENTER | Age: 78
End: 2023-02-06
Payer: MEDICARE

## 2023-02-06 ENCOUNTER — OUTPATIENT (OUTPATIENT)
Dept: OUTPATIENT SERVICES | Facility: HOSPITAL | Age: 78
LOS: 1 days | End: 2023-02-06
Payer: MEDICARE

## 2023-02-06 DIAGNOSIS — Z90.49 ACQUIRED ABSENCE OF OTHER SPECIFIED PARTS OF DIGESTIVE TRACT: Chronic | ICD-10-CM

## 2023-02-06 DIAGNOSIS — Z98.890 OTHER SPECIFIED POSTPROCEDURAL STATES: Chronic | ICD-10-CM

## 2023-02-06 DIAGNOSIS — Z00.00 ENCOUNTER FOR GENERAL ADULT MEDICAL EXAMINATION WITHOUT ABNORMAL FINDINGS: ICD-10-CM

## 2023-02-06 PROCEDURE — 72148 MRI LUMBAR SPINE W/O DYE: CPT | Mod: 26

## 2023-02-06 PROCEDURE — 72148 MRI LUMBAR SPINE W/O DYE: CPT

## 2023-02-13 ENCOUNTER — APPOINTMENT (OUTPATIENT)
Dept: ORTHOPEDIC SURGERY | Facility: CLINIC | Age: 78
End: 2023-02-13
Payer: MEDICARE

## 2023-02-13 VITALS
BODY MASS INDEX: 26.21 KG/M2 | DIASTOLIC BLOOD PRESSURE: 70 MMHG | TEMPERATURE: 97.7 F | HEIGHT: 59 IN | WEIGHT: 130 LBS | OXYGEN SATURATION: 97 % | HEART RATE: 74 BPM | SYSTOLIC BLOOD PRESSURE: 107 MMHG

## 2023-02-13 PROCEDURE — 99214 OFFICE O/P EST MOD 30 MIN: CPT

## 2023-02-13 NOTE — PHYSICAL EXAM
[Antalgic] : antalgic [Stooped] : stooped [de-identified] : Examination of the lumbar spine reveals no midline tenderness palpation, step-offs, or skin lesions. Decreased range of motion with respect to flexion, extension, lateral bending, and rotation. No tenderness to palpation of the sciatic notch. No tenderness palpation of the bilateral greater trochanters. No pain with passive internal/external rotation of the hips. No instability of bilateral lower extremities.  Negative JUSTINE. Negative straight leg raise bilaterally. No bowstring. Negative femoral stretch. 5 out of 5 iliopsoas, hip abductors, hips adductors, quadriceps, hamstrings, gastrocsoleus, tibialis anterior, extensor hallucis longus, peroneals. Grossly intact sensation to light touch bilateral lower extremities. 1+ patellar and Achilles reflexes. Downgoing Babinski. No clonus. Intact proprioception. Palpable pulses. No skin lesion and no edema on the right and left lower extremities. [de-identified] : AP lateral lumbar x-rays reviewed by me today reveals multilevel spondylosis.  She may have some trace L4-5 spondylolisthesis.\par \par Review of her lumbar spine MRI reveals moderate stenosis at L3-4 and severe stenosis at L4-5.  She also has some slight L4-5 spondylolisthesis.

## 2023-02-13 NOTE — HISTORY OF PRESENT ILLNESS
[de-identified] : Ms. VENITA DELGADO  is a 78 year old female who presents to the office for a follow-up visit.  She is here to review MRI results.  She feels slightly better.  Symptoms will wax and wane. \par

## 2023-02-13 NOTE — DISCUSSION/SUMMARY
[de-identified] : We reviewed her imaging.  We discussed further treatment options.  Overall her symptoms are better than she last saw me.  We discussed further treatment options including formal physical therapy, injections, and even surgery.  At this point she would like to try some physical therapy.  Prescription was given.  She will follow-up in 6 weeks time for reevaluation or sooner with any changes or worsening of her symptoms.

## 2023-02-16 ENCOUNTER — APPOINTMENT (OUTPATIENT)
Dept: NEUROLOGY | Facility: CLINIC | Age: 78
End: 2023-02-16

## 2023-03-16 ENCOUNTER — NON-APPOINTMENT (OUTPATIENT)
Age: 78
End: 2023-03-16

## 2023-03-16 ENCOUNTER — APPOINTMENT (OUTPATIENT)
Dept: OPHTHALMOLOGY | Facility: CLINIC | Age: 78
End: 2023-03-16
Payer: MEDICARE

## 2023-03-16 PROCEDURE — 92012 INTRM OPH EXAM EST PATIENT: CPT

## 2023-03-16 PROCEDURE — 92083 EXTENDED VISUAL FIELD XM: CPT

## 2023-03-16 PROCEDURE — 92133 CPTRZD OPH DX IMG PST SGM ON: CPT

## 2023-04-03 ENCOUNTER — APPOINTMENT (OUTPATIENT)
Dept: ORTHOPEDIC SURGERY | Facility: CLINIC | Age: 78
End: 2023-04-03
Payer: MEDICARE

## 2023-04-03 VITALS — BODY MASS INDEX: 26.21 KG/M2 | WEIGHT: 130 LBS | TEMPERATURE: 98 F | HEIGHT: 59 IN

## 2023-04-03 DIAGNOSIS — M54.16 RADICULOPATHY, LUMBAR REGION: ICD-10-CM

## 2023-04-03 PROCEDURE — 99214 OFFICE O/P EST MOD 30 MIN: CPT

## 2023-04-05 PROBLEM — M54.16 LUMBAR RADICULOPATHY: Status: ACTIVE | Noted: 2023-02-01

## 2023-04-05 NOTE — PHYSICAL EXAM
[Antalgic] : antalgic [Stooped] : stooped [de-identified] : Examination of the lumbar spine reveals no midline tenderness palpation, step-offs, or skin lesions. Decreased range of motion with respect to flexion, extension, lateral bending, and rotation. No tenderness to palpation of the sciatic notch. No tenderness palpation of the bilateral greater trochanters. No pain with passive internal/external rotation of the hips. No instability of bilateral lower extremities.  Negative JUSTINE. Negative straight leg raise bilaterally. No bowstring. Negative femoral stretch. 5 out of 5 iliopsoas, hip abductors, hips adductors, quadriceps, hamstrings, gastrocsoleus, tibialis anterior, extensor hallucis longus, peroneals. Grossly intact sensation to light touch bilateral lower extremities. 1+ patellar and Achilles reflexes. Downgoing Babinski. No clonus. Intact proprioception. Palpable pulses. No skin lesion and no edema on the right and left lower extremities. [de-identified] : AP lateral lumbar x-rays reviewed by me today reveals multilevel spondylosis.  She may have some trace L4-5 spondylolisthesis.\par \par Review of her lumbar spine MRI reveals moderate stenosis at L3-4 and severe stenosis at L4-5.  She also has some slight L4-5 spondylolisthesis.

## 2023-04-05 NOTE — HISTORY OF PRESENT ILLNESS
[de-identified] : Ms. VENITA DELGADO  is a 78 year old female who presents to the office for a follow-up visit.  She has been doing PT and she continues to have bilateral leg and left heel pain.

## 2023-04-05 NOTE — DISCUSSION/SUMMARY
[de-identified] : We reviewed her imaging.  We discussed further treatment options both nonsurgical and surgical.  This point she would like to try epidural injections.  We briefly discussed the role of surgery in the form of a lumbar decompression and possible fusion.  Follow-up after evaluation for epidural injections or sooner with any changes or worsening of her symptoms.

## 2023-04-13 ENCOUNTER — APPOINTMENT (OUTPATIENT)
Dept: NEUROLOGY | Facility: CLINIC | Age: 78
End: 2023-04-13

## 2023-06-02 ENCOUNTER — APPOINTMENT (OUTPATIENT)
Dept: NEUROLOGY | Facility: CLINIC | Age: 78
End: 2023-06-02

## 2023-07-26 ENCOUNTER — APPOINTMENT (OUTPATIENT)
Dept: ORTHOPEDIC SURGERY | Facility: CLINIC | Age: 78
End: 2023-07-26
Payer: MEDICARE

## 2023-07-26 PROCEDURE — 99215 OFFICE O/P EST HI 40 MIN: CPT

## 2023-07-26 NOTE — DISCUSSION/SUMMARY
[de-identified] : We reviewed her MRI.  We discussed further treatment options.  She has had multiple injections for her lumbar spine without significant relief.  She also had an injection for her cervical spine without relief.  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  We reviewed her MRI which does demonstrate significant stenosis at some areas of myelomalacia.  We have discussed the role of surgery.  We have discussed anterior and posterior procedures.  We have talked more about posterior procedures given her circumferential stenosis and multilevel nature.  We discussed the natural history of cervical myelopathy.  At this point she is unsure if she wished to proceed with surgery.  She will consider her options and let me know how she would like to proceed.

## 2023-07-26 NOTE — HISTORY OF PRESENT ILLNESS
[de-identified] : Ms. VENITA DELGADO  is a 78 year old female who presents to the office for a follow-up visit.   She had a recent cervical MRI.  She has had some pain into her right upper extremity.

## 2023-07-26 NOTE — PHYSICAL EXAM
[Antalgic] : antalgic [Stooped] : stooped [Ataxic] : not ataxic [de-identified] : Examination of the cervical spine reveals no midline or paraspinal tenderness to palpation. No cervical lymphadenopathy. Decreased range of motion with respect to flexion, extension, rotation, and lateral bending. Negative Spurlings. Negative Lhermitte's. Full range of motion bilateral shoulders without evidence of impingement. No instability of bilateral upper extremities.  Cranial nerves II through XII grossly intact. Intact sensation bilateral upper extremities. 5/5 deltoids biceps triceps wrist extensors wrist flexors finger flexors and hand intrinsics. 1+ biceps triceps and brachioradialis reflexes.  Positive Solomon's. 2+ radial pulse. Negative Tinel's over the cubital and carpal tunnel. No skin lesions on the right and left upper extremities.  Slow alternating hand movements\par \par Examination of the lumbar spine reveals no midline tenderness palpation, step-offs, or skin lesions. Decreased range of motion with respect to flexion, extension, lateral bending, and rotation. No tenderness to palpation of the sciatic notch. No tenderness palpation of the bilateral greater trochanters. No pain with passive internal/external rotation of the hips. No instability of bilateral lower extremities.  Negative JUSTINE. Negative straight leg raise bilaterally. No bowstring. Negative femoral stretch. 5 out of 5 iliopsoas, hip abductors, hips adductors, quadriceps, hamstrings, gastrocsoleus, tibialis anterior, extensor hallucis longus, peroneals. Grossly intact sensation to light touch bilateral lower extremities. 1+ patellar and Achilles reflexes. Downgoing Babinski. No clonus. Intact proprioception. Palpable pulses. No skin lesion and no edema on the right and left lower extremities. [de-identified] : AP lateral lumbar x-rays reviewed by me today reveals multilevel spondylosis.  She may have some trace L4-5 spondylolisthesis.\par \par Review of her lumbar spine MRI reveals moderate stenosis at L3-4 and severe stenosis at L4-5.  She also has some slight L4-5 spondylolisthesis.\par \par Review of her cervical spine MRI does reveal more significant stenosis with some areas of cord compression from C3-7.  Myelomalacia at the C3-4 level.

## 2023-08-23 ENCOUNTER — APPOINTMENT (OUTPATIENT)
Dept: ORTHOPEDIC SURGERY | Facility: CLINIC | Age: 78
End: 2023-08-23

## 2023-08-24 ENCOUNTER — APPOINTMENT (OUTPATIENT)
Dept: ORTHOPEDIC SURGERY | Facility: CLINIC | Age: 78
End: 2023-08-24
Payer: MEDICARE

## 2023-08-24 VITALS
DIASTOLIC BLOOD PRESSURE: 74 MMHG | SYSTOLIC BLOOD PRESSURE: 134 MMHG | OXYGEN SATURATION: 98 % | BODY MASS INDEX: 26.21 KG/M2 | HEART RATE: 80 BPM | WEIGHT: 130 LBS | HEIGHT: 59 IN

## 2023-08-24 PROCEDURE — 72040 X-RAY EXAM NECK SPINE 2-3 VW: CPT

## 2023-08-24 PROCEDURE — 99215 OFFICE O/P EST HI 40 MIN: CPT

## 2023-08-24 PROCEDURE — 72100 X-RAY EXAM L-S SPINE 2/3 VWS: CPT

## 2023-08-24 NOTE — ADDENDUM
[FreeTextEntry1] : This note was written by Cristopher Campbell on 08/24/2023 acting as scribe for Dr. Dieter Carbajal M.D.  I, Dieter Carbajal MD, have read and attest that all the information, medical decision making and discharge instructions within are true and accurate.

## 2023-08-24 NOTE — PHYSICAL EXAM
[Normal] : Gait: normal [Solomon's Sign] : positive Solomon's sign [Pronator Drift] : negative pronator drift [SLR] : negative straight leg raise [de-identified] : 5 out of 5 motor strength, sensation is intact and symmetrical full range of motion flexion extension and rotation, no palpatory tenderness full range of motion of hips knees shoulders and elbows (all four extremities), no atrophy, negative straight leg raise, no pathological reflexes, no swelling, normal ambulation, no apparent distress skin is intact, no palpable lymph nodes, no upper or lower extremity instability, alert and oriented x3 and normal mood. Normal finger-to nose test.  + left Solomon's sign + bilateral hyper patellar reflex [de-identified] : XR AP Lat Lumbar 08/24/2023 -L4-5 spondylolisthesis- reviewed with the patient.  XR AP Lat Cervical 08/24/2023 -adequate- reviewed with the patient.   I reviewed, interpreted and clinically correlated the following outside imaging studies,  MRI Cervical 5/3/23 ([facility])  C3-4 moderate cord impingement, moderate stenosis Myelomalacia. C4-5 mild stenosis C6-7 Mild stenosis  MR SPINE LUMBAR  - ORDERED BY: SKYE WASHINGTON   PROCEDURE DATE:  02/06/2023    INTERPRETATION:  CLINICAL INFORMATION: Bilateral lower extremity pain and radiculopathy.  ADDITIONAL CLINICAL INFORMATION: Low back muscle strain S39.012A  TECHNIQUE: Multiplanar, multisequence MRI was performed of the lumbar spine.  PRIOR STUDIES: No priors available for comparison.  FINDINGS:  LOCALIZER: No additional findings. BONES: There is no fracture or bone marrow edema. Small hemangioma at the superior T11 vertebra. ALIGNMENT: The alignment is normal. SACROILIAC JOINTS/SACRUM: There is no sacral fracture. The SI joints are partially visualized but are intact. CONUS AND CAUDA EQUINA: The distal cord and conus are normal in signal. Conus terminates at L2. VISUALIZED INTRAPELVIC/INTRA-ABDOMINAL SOFT TISSUES: Normal. PARASPINAL SOFT TISSUES: Normal.   INDIVIDUAL LEVELS:  LOWER THORACIC SPINE: No spinal canal or neuroforaminal stenosis.  L1-L2: Moderate left and mild right facet degenerative change. No central canal or foraminal narrowing. L2-L3: Mild to moderate facet degenerative change. No central canal narrowing. No foraminal narrowing. L3-L4: Moderate facet degenerative change. Mild disc bulging. Moderate central canal narrowing. Mild foraminal narrowing. A left foraminal disc protrusion contacts the exiting left L3 nerve root. L4-L5: Severe left and moderate right facet degenerative change. Mild anterolisthesis uncovering the disc space. Osseous ridging and disc bulging is asymmetric to left. Severe central canal stenosis. Moderate to severe left foraminal narrowing with contact of exiting left L4 nerve root. Mild right foraminal narrowing. L5-S1: Moderate facet degenerative change. Posterior osseous ridging and disc bulging. Moderate to severe bilateral foraminal narrowing. No central canal narrowing.   IMPRESSION:  Multilevel spondylosis most severe at L3-4 and L4-5 as above.  --- End of Report ---

## 2023-08-24 NOTE — DISCUSSION/SUMMARY
[Medication Risks Reviewed] : Medication risks reviewed [Surgical risks reviewed] : Surgical risks reviewed [de-identified] : L4-5 severe stenosis. Lumbar disc degenerative disease  C3-4 moderate stenosis and cord impingement.  Discussed all options. She has cervical stenosis with myelopathic signs and symptoms and myelomalacia.  She also has lumbar spinal stenosis with a spondylolisthesis at L4-5.  Recommended that she has her cervical spine addressed first the possibility of future lumbar surgery. Her neck should be done before her lumbar spine. Surgery will involve a posterior cervical decompression and fusion utilizing rods and screws used off label and explained to the patient, local autograft bone and spinal cord monitoring will be utilized. Avendano head pham. Risks of surgery include infection, dural tear, nerve root injury, spinal cord injury, adjacent level breakdown, future arm pain, future neck pain, difficulty swallowing, difficulty breathing, recurrent laryngeal nerve injury, esophageal in the injury, tracheal injury, hematoma, Avendano head pham risks. C5-6 nerve palsy, hardware failure, adjacent level surgery, anesthetic risk, positioning pain, blood transfusion risk, nonunion requiring additional surgery, deep vein thrombosis, pulmonary embolus, myocardial infarction and death. Somatosensory evoke potentials and motor evoked potentials and EMG monitoring will be used. Patient will need spinal orthosis pre and post operatively. All risks were explained not exclusive to the ones mentioned alternatives were discussed and all questions were answered the patient agrees and understands the above and is in complete agreement with the plan.  Risks of surgery include infection, dural tear, nerve root injury, reherniation, future leg pain, future back pain, retained fragment, hematoma, urinary retention, worsening leg symptoms, foot drop, anesthetic risks, blood transfusion risks, positioning pain, visceral vascular injury, deep vein thrombosis, pulmonary embolus, and death. Somatosensory evoked potentials and EMG monitoring will be used. Surgery will involve lumbar decompression and fusion with or without instrumentation, local auto bone fusion, demineralized bone matrix, and neural monitoring. Patient will need spinal orthosis pre and post operatively. All risks were explained not exclusive to the ones mentioned alternatives were discussed and all questions were answered the patient agrees and understands the above and is in complete agreement with the plan.  All options discussed including rest, medicine, home exercise, acupuncture, Chiropractic care, Physical Therapy, Pain management, and last resort surgery. All questions were answered, all alternatives discussed, and the patient is in complete agreement with the treatment plan which the patient contributed to and discussed with me through the shared decision-making process. Follow-up appointment as instructed. Any issues and the patient will call or come in sooner.  He would like to have the surgery scheduled next several weeks pending medical clearance and insurance approval. Shelley agrees with plan.

## 2023-08-24 NOTE — HISTORY OF PRESENT ILLNESS
[de-identified] : 78 year old female presents for evaluation. She has been seeing Dr. Jorge. Has moderate L3-4 stenosis and severe L4-5 stenosis with slight L4/5 Spondylolisthesis. She also has Cervical Stenosis C3-7, with myelomalacia C3-4. She has neck pain that radiates down the RUE to the hand. She also has lower back pain that radiates down the B/L LE posteriorly to the calves. She denies any weakness of the arms or legs. Denies issues with fine motor function. She has been taking meloxicam and gabapentin. She had participated with PT, for about 20 visits so far the lumbar spine. She states the PT did not help. She underwent LESI x 2 and PATRICIA x 1 about 2 months ago with Dr. Fisher but did not have relief. History of colon cancer.  She was offered cervical and lumbar surgery, and she presents for a 2nd opinion.  No fever, chills, sweats, nausea/vomiting. No bowel or bladder dysfunction, no recent weight loss or gain. No night pain. This history is in addition to the intake form that I personally reviewed.  [Worsening] : worsening

## 2023-09-15 ENCOUNTER — APPOINTMENT (OUTPATIENT)
Dept: NEUROLOGY | Facility: CLINIC | Age: 78
End: 2023-09-15

## 2023-09-19 ENCOUNTER — OUTPATIENT (OUTPATIENT)
Dept: OUTPATIENT SERVICES | Facility: HOSPITAL | Age: 78
LOS: 1 days | End: 2023-09-19
Payer: MEDICARE

## 2023-09-19 VITALS
RESPIRATION RATE: 18 BRPM | WEIGHT: 123.9 LBS | HEART RATE: 70 BPM | DIASTOLIC BLOOD PRESSURE: 77 MMHG | OXYGEN SATURATION: 98 % | TEMPERATURE: 98 F | HEIGHT: 59 IN | SYSTOLIC BLOOD PRESSURE: 120 MMHG

## 2023-09-19 DIAGNOSIS — M48.02 SPINAL STENOSIS, CERVICAL REGION: ICD-10-CM

## 2023-09-19 DIAGNOSIS — M50.01 CERVICAL DISC DISORDER WITH MYELOPATHY, HIGH CERVICAL REGION: ICD-10-CM

## 2023-09-19 DIAGNOSIS — Z90.49 ACQUIRED ABSENCE OF OTHER SPECIFIED PARTS OF DIGESTIVE TRACT: Chronic | ICD-10-CM

## 2023-09-19 DIAGNOSIS — Z98.890 OTHER SPECIFIED POSTPROCEDURAL STATES: Chronic | ICD-10-CM

## 2023-09-19 LAB
A1C WITH ESTIMATED AVERAGE GLUCOSE RESULT: 5 % — SIGNIFICANT CHANGE UP (ref 4–5.6)
ANION GAP SERPL CALC-SCNC: 14 MMOL/L — SIGNIFICANT CHANGE UP (ref 7–14)
BLD GP AB SCN SERPL QL: NEGATIVE — SIGNIFICANT CHANGE UP
BUN SERPL-MCNC: 11 MG/DL — SIGNIFICANT CHANGE UP (ref 7–23)
CALCIUM SERPL-MCNC: 9.9 MG/DL — SIGNIFICANT CHANGE UP (ref 8.4–10.5)
CHLORIDE SERPL-SCNC: 103 MMOL/L — SIGNIFICANT CHANGE UP (ref 98–107)
CO2 SERPL-SCNC: 24 MMOL/L — SIGNIFICANT CHANGE UP (ref 22–31)
CREAT SERPL-MCNC: 0.56 MG/DL — SIGNIFICANT CHANGE UP (ref 0.5–1.3)
EGFR: 93 ML/MIN/1.73M2 — SIGNIFICANT CHANGE UP
ESTIMATED AVERAGE GLUCOSE: 97 — SIGNIFICANT CHANGE UP
GLUCOSE SERPL-MCNC: 82 MG/DL — SIGNIFICANT CHANGE UP (ref 70–99)
HCT VFR BLD CALC: 41.4 % — SIGNIFICANT CHANGE UP (ref 34.5–45)
HGB BLD-MCNC: 13.6 G/DL — SIGNIFICANT CHANGE UP (ref 11.5–15.5)
MCHC RBC-ENTMCNC: 30.8 PG — SIGNIFICANT CHANGE UP (ref 27–34)
MCHC RBC-ENTMCNC: 32.9 GM/DL — SIGNIFICANT CHANGE UP (ref 32–36)
MCV RBC AUTO: 93.9 FL — SIGNIFICANT CHANGE UP (ref 80–100)
MRSA PCR RESULT.: SIGNIFICANT CHANGE UP
NRBC # BLD: 0 /100 WBCS — SIGNIFICANT CHANGE UP (ref 0–0)
NRBC # FLD: 0 K/UL — SIGNIFICANT CHANGE UP (ref 0–0)
PLATELET # BLD AUTO: 267 K/UL — SIGNIFICANT CHANGE UP (ref 150–400)
POTASSIUM SERPL-MCNC: 4.6 MMOL/L — SIGNIFICANT CHANGE UP (ref 3.5–5.3)
POTASSIUM SERPL-SCNC: 4.6 MMOL/L — SIGNIFICANT CHANGE UP (ref 3.5–5.3)
RBC # BLD: 4.41 M/UL — SIGNIFICANT CHANGE UP (ref 3.8–5.2)
RBC # FLD: 11.4 % — SIGNIFICANT CHANGE UP (ref 10.3–14.5)
RH IG SCN BLD-IMP: POSITIVE — SIGNIFICANT CHANGE UP
S AUREUS DNA NOSE QL NAA+PROBE: SIGNIFICANT CHANGE UP
SODIUM SERPL-SCNC: 141 MMOL/L — SIGNIFICANT CHANGE UP (ref 135–145)
WBC # BLD: 9.42 K/UL — SIGNIFICANT CHANGE UP (ref 3.8–10.5)
WBC # FLD AUTO: 9.42 K/UL — SIGNIFICANT CHANGE UP (ref 3.8–10.5)

## 2023-09-19 PROCEDURE — 93010 ELECTROCARDIOGRAM REPORT: CPT

## 2023-09-19 RX ORDER — ASCORBIC ACID 60 MG
0 TABLET,CHEWABLE ORAL
Refills: 0 | DISCHARGE

## 2023-09-19 RX ORDER — L.ACIDOPH/B.ANIMALIS/B.LONGUM 15B CELL
1 CAPSULE ORAL
Qty: 0 | Refills: 0 | DISCHARGE

## 2023-09-19 RX ORDER — SODIUM CHLORIDE 9 MG/ML
1000 INJECTION, SOLUTION INTRAVENOUS
Refills: 0 | Status: DISCONTINUED | OUTPATIENT
Start: 2023-10-03 | End: 2023-10-06

## 2023-09-19 RX ORDER — MULTIVIT-MIN/FERROUS GLUCONATE 9 MG/15 ML
1 LIQUID (ML) ORAL
Qty: 0 | Refills: 0 | DISCHARGE

## 2023-09-19 NOTE — H&P PST ADULT - PROBLEM SELECTOR PLAN 1
Patient tentatively scheduled for C3-6 Posterior cervical fusion on 10/3/23.  Pre-op instructions provided. Pt given verbal and written instructions with teach back on chlorhexidine shampoo and pepcid. Pt verbalized understanding with return demonstration.     CBC, BMP, T&S, HgbA1c, MRSA swab, EKG were done at [facility].  Medical evaluation requested (Advance age for high risk procedure). Patient will see PCP tomorrow 9/20/23.

## 2023-09-19 NOTE — H&P PST ADULT - MUSCULOSKELETAL
FULL ROM neck/normal/ROM intact/no joint swelling/no joint erythema/no joint warmth/no calf tenderness/normal gait/strength 5/5 bilateral upper extremities/strength 5/5 bilateral lower extremities details…

## 2023-09-19 NOTE — H&P PST ADULT - NSICDXPASTSURGICALHX_GEN_ALL_CORE_FT
PAST SURGICAL HISTORY:   delivery delivered many yrs ago with tubal ligation    H/O abdominoplasty many yrs ago    H/O colonoscopy     History of cholecystectomy 15 yrs ago    History of partial colectomy     S/P laparoscopic appendectomy 19

## 2023-09-19 NOTE — H&P PST ADULT - MS GEN HX ROS MEA POS PC
Chronic neck pain with right arm radiculopathy/arthralgia/arthritis/stiffness/neck pain/arm pain R/back pain/leg pain L/leg pain R

## 2023-09-19 NOTE — H&P PST ADULT - NEGATIVE NEUROLOGICAL SYMPTOMS
no weakness/no generalized seizures/no focal seizures/no syncope/no tremors/no vertigo/no loss of sensation/no headache

## 2023-09-19 NOTE — H&P PST ADULT - OPH GEN HX ROS MEA POS PC
wears glassed for reading/blurred vision L/blurred vision R wears glassed for reading, hx of right eye blindness/blurred vision L

## 2023-09-19 NOTE — H&P PST ADULT - NSICDXPASTMEDICALHX_GEN_ALL_CORE_FT
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

## 2023-09-19 NOTE — H&P PST ADULT - REASON FOR ADMISSION
"I am having a cervical fusion." "I am having a Cervical fusion for pain relief." "I am having a Cervical fusion surgery for pain relief."

## 2023-09-19 NOTE — H&P PST ADULT - HISTORY OF PRESENT ILLNESS
78 year old female with pmhx of HLD, Lumbar stenosis, Colon ca s/p partial colectomy (2019), Right eye blindness (from childhood), presents for pre-op evaluation for diagnosis of Spinal stenosis cervical region. Patient is scheduled for C3-6 Posterior cervical fusion. Patient has tried other modalities such as NSAID, PT, and steroid injections without relief.

## 2023-09-19 NOTE — H&P PST ADULT - EYES
PERRL/EOMI/conjunctiva clear/normal hx right eye blindness-wears prosthetic/PERRL/conjunctiva clear/non icteric sclera

## 2023-09-19 NOTE — H&P PST ADULT - NEGATIVE ENMT SYMPTOMS
no hearing difficulty/no ear pain/no tinnitus/no vertigo/no sinus symptoms/no nasal congestion/no nose bleeds/no abnormal taste sensation/no gum bleeding/no dry mouth/no throat pain/no dysphagia

## 2023-09-21 ENCOUNTER — APPOINTMENT (OUTPATIENT)
Dept: OPHTHALMOLOGY | Facility: CLINIC | Age: 78
End: 2023-09-21
Payer: MEDICARE

## 2023-09-21 ENCOUNTER — NON-APPOINTMENT (OUTPATIENT)
Age: 78
End: 2023-09-21

## 2023-09-21 PROBLEM — M48.02 SPINAL STENOSIS, CERVICAL REGION: Chronic | Status: ACTIVE | Noted: 2023-09-19

## 2023-09-21 PROBLEM — E78.00 PURE HYPERCHOLESTEROLEMIA, UNSPECIFIED: Chronic | Status: ACTIVE | Noted: 2023-09-19

## 2023-09-21 PROBLEM — M19.90 UNSPECIFIED OSTEOARTHRITIS, UNSPECIFIED SITE: Chronic | Status: ACTIVE | Noted: 2023-09-19

## 2023-09-21 PROBLEM — C18.9 MALIGNANT NEOPLASM OF COLON, UNSPECIFIED: Chronic | Status: ACTIVE | Noted: 2023-09-19

## 2023-09-21 PROBLEM — Z87.19 PERSONAL HISTORY OF OTHER DISEASES OF THE DIGESTIVE SYSTEM: Chronic | Status: ACTIVE | Noted: 2023-09-19

## 2023-09-21 PROBLEM — M48.061 SPINAL STENOSIS, LUMBAR REGION WITHOUT NEUROGENIC CLAUDICATION: Chronic | Status: ACTIVE | Noted: 2023-09-19

## 2023-09-21 PROCEDURE — 92250 FUNDUS PHOTOGRAPHY W/I&R: CPT

## 2023-09-21 PROCEDURE — 92020 GONIOSCOPY: CPT

## 2023-09-21 PROCEDURE — 92014 COMPRE OPH EXAM EST PT 1/>: CPT

## 2023-09-21 PROCEDURE — 92083 EXTENDED VISUAL FIELD XM: CPT

## 2023-10-02 ENCOUNTER — TRANSCRIPTION ENCOUNTER (OUTPATIENT)
Age: 78
End: 2023-10-02

## 2023-10-02 NOTE — ASU PATIENT PROFILE, ADULT - VISION (WITH CORRECTIVE LENSES IF THE PATIENT USUALLY WEARS THEM):
glasses, right eye blind/Partially impaired: cannot see medication labels or newsprint, but can see obstacles in path, and the surrounding layout; can count fingers at arm's length

## 2023-10-02 NOTE — ASU PATIENT PROFILE, ADULT - FALL HARM RISK - UNIVERSAL INTERVENTIONS
Bed in lowest position, wheels locked, appropriate side rails in place/Call bell, personal items and telephone in reach/Instruct patient to call for assistance before getting out of bed or chair/Non-slip footwear when patient is out of bed/[place] to call system/Physically safe environment - no spills, clutter or unnecessary equipment/Purposeful Proactive Rounding/Room/bathroom lighting operational, light cord in reach

## 2023-10-03 ENCOUNTER — INPATIENT (INPATIENT)
Facility: HOSPITAL | Age: 78
LOS: 2 days | Discharge: HOME | End: 2023-10-06
Attending: ORTHOPAEDIC SURGERY | Admitting: ORTHOPAEDIC SURGERY
Payer: MEDICARE

## 2023-10-03 ENCOUNTER — APPOINTMENT (OUTPATIENT)
Dept: ORTHOPEDIC SURGERY | Facility: HOSPITAL | Age: 78
End: 2023-10-03
Payer: MEDICARE

## 2023-10-03 VITALS
TEMPERATURE: 100 F | OXYGEN SATURATION: 96 % | WEIGHT: 123.9 LBS | HEIGHT: 59 IN | SYSTOLIC BLOOD PRESSURE: 134 MMHG | RESPIRATION RATE: 106 BRPM | DIASTOLIC BLOOD PRESSURE: 71 MMHG | HEART RATE: 112 BPM

## 2023-10-03 DIAGNOSIS — Z98.890 OTHER SPECIFIED POSTPROCEDURAL STATES: Chronic | ICD-10-CM

## 2023-10-03 DIAGNOSIS — M50.01 CERVICAL DISC DISORDER WITH MYELOPATHY, HIGH CERVICAL REGION: ICD-10-CM

## 2023-10-03 DIAGNOSIS — Z90.49 ACQUIRED ABSENCE OF OTHER SPECIFIED PARTS OF DIGESTIVE TRACT: Chronic | ICD-10-CM

## 2023-10-03 LAB
ANION GAP SERPL CALC-SCNC: 12 MMOL/L — SIGNIFICANT CHANGE UP (ref 7–14)
BUN SERPL-MCNC: 8 MG/DL — SIGNIFICANT CHANGE UP (ref 7–23)
CALCIUM SERPL-MCNC: 8.5 MG/DL — SIGNIFICANT CHANGE UP (ref 8.4–10.5)
CHLORIDE SERPL-SCNC: 104 MMOL/L — SIGNIFICANT CHANGE UP (ref 98–107)
CO2 SERPL-SCNC: 23 MMOL/L — SIGNIFICANT CHANGE UP (ref 22–31)
CREAT SERPL-MCNC: 0.58 MG/DL — SIGNIFICANT CHANGE UP (ref 0.5–1.3)
EGFR: 93 ML/MIN/1.73M2 — SIGNIFICANT CHANGE UP
GLUCOSE BLDC GLUCOMTR-MCNC: 108 MG/DL — HIGH (ref 70–99)
GLUCOSE SERPL-MCNC: 168 MG/DL — HIGH (ref 70–99)
HCT VFR BLD CALC: 34.2 % — LOW (ref 34.5–45)
HGB BLD-MCNC: 11.4 G/DL — LOW (ref 11.5–15.5)
MCHC RBC-ENTMCNC: 31 PG — SIGNIFICANT CHANGE UP (ref 27–34)
MCHC RBC-ENTMCNC: 33.3 GM/DL — SIGNIFICANT CHANGE UP (ref 32–36)
MCV RBC AUTO: 92.9 FL — SIGNIFICANT CHANGE UP (ref 80–100)
NRBC # BLD: 0 /100 WBCS — SIGNIFICANT CHANGE UP (ref 0–0)
NRBC # FLD: 0 K/UL — SIGNIFICANT CHANGE UP (ref 0–0)
PLATELET # BLD AUTO: 186 K/UL — SIGNIFICANT CHANGE UP (ref 150–400)
POTASSIUM SERPL-MCNC: 3.5 MMOL/L — SIGNIFICANT CHANGE UP (ref 3.5–5.3)
POTASSIUM SERPL-SCNC: 3.5 MMOL/L — SIGNIFICANT CHANGE UP (ref 3.5–5.3)
RBC # BLD: 3.68 M/UL — LOW (ref 3.8–5.2)
RBC # FLD: 11.4 % — SIGNIFICANT CHANGE UP (ref 10.3–14.5)
SODIUM SERPL-SCNC: 139 MMOL/L — SIGNIFICANT CHANGE UP (ref 135–145)
WBC # BLD: 10.29 K/UL — SIGNIFICANT CHANGE UP (ref 3.8–10.5)
WBC # FLD AUTO: 10.29 K/UL — SIGNIFICANT CHANGE UP (ref 3.8–10.5)

## 2023-10-03 PROCEDURE — 22614 ARTHRD PST TQ 1NTRSPC EA ADD: CPT | Mod: 82

## 2023-10-03 PROCEDURE — 22600 ARTHRD PST TQ 1NTRSPC CRV: CPT | Mod: 82

## 2023-10-03 PROCEDURE — 63048 LAM FACETEC &FORAMOT EA ADDL: CPT | Mod: 82

## 2023-10-03 PROCEDURE — 63045 LAM FACETEC & FORAMOT CRV: CPT

## 2023-10-03 PROCEDURE — 72040 X-RAY EXAM NECK SPINE 2-3 VW: CPT | Mod: 26

## 2023-10-03 PROCEDURE — 63045 LAM FACETEC & FORAMOT CRV: CPT | Mod: 82

## 2023-10-03 PROCEDURE — 22842 INSERT SPINE FIXATION DEVICE: CPT | Mod: 82

## 2023-10-03 PROCEDURE — 22842 INSERT SPINE FIXATION DEVICE: CPT

## 2023-10-03 PROCEDURE — 22600 ARTHRD PST TQ 1NTRSPC CRV: CPT

## 2023-10-03 PROCEDURE — 22614 ARTHRD PST TQ 1NTRSPC EA ADD: CPT

## 2023-10-03 PROCEDURE — 63048 LAM FACETEC &FORAMOT EA ADDL: CPT

## 2023-10-03 DEVICE — ROD YUKON CONTOUR 3.5X65MM: Type: IMPLANTABLE DEVICE | Status: FUNCTIONAL

## 2023-10-03 DEVICE — SURGIFLO MATRIX WITH THROMBIN KIT: Type: IMPLANTABLE DEVICE | Status: FUNCTIONAL

## 2023-10-03 DEVICE — SCREW POLYAXIAL 3.5X12MM: Type: IMPLANTABLE DEVICE | Status: FUNCTIONAL

## 2023-10-03 DEVICE — SCREW SET YUKON: Type: IMPLANTABLE DEVICE | Status: FUNCTIONAL

## 2023-10-03 DEVICE — SURGIFOAM PAD 8CM X 12.5CM X 2MM (100C): Type: IMPLANTABLE DEVICE | Status: FUNCTIONAL

## 2023-10-03 DEVICE — BONE WAX 2.5GM: Type: IMPLANTABLE DEVICE | Status: FUNCTIONAL

## 2023-10-03 DEVICE — MAYFIELD SKULL PIN ADULT PLASTIC: Type: IMPLANTABLE DEVICE | Status: FUNCTIONAL

## 2023-10-03 RX ORDER — TRAMADOL HYDROCHLORIDE 50 MG/1
25 TABLET ORAL ONCE
Refills: 0 | Status: DISCONTINUED | OUTPATIENT
Start: 2023-10-03 | End: 2023-10-03

## 2023-10-03 RX ORDER — HYDROMORPHONE HYDROCHLORIDE 2 MG/ML
0.25 INJECTION INTRAMUSCULAR; INTRAVENOUS; SUBCUTANEOUS
Refills: 0 | Status: DISCONTINUED | OUTPATIENT
Start: 2023-10-03 | End: 2023-10-04

## 2023-10-03 RX ORDER — OXYCODONE HYDROCHLORIDE 5 MG/1
2.5 TABLET ORAL EVERY 4 HOURS
Refills: 0 | Status: DISCONTINUED | OUTPATIENT
Start: 2023-10-03 | End: 2023-10-06

## 2023-10-03 RX ORDER — ACETAMINOPHEN 500 MG
975 TABLET ORAL EVERY 8 HOURS
Refills: 0 | Status: DISCONTINUED | OUTPATIENT
Start: 2023-10-03 | End: 2023-10-06

## 2023-10-03 RX ORDER — GABAPENTIN 400 MG/1
300 CAPSULE ORAL AT BEDTIME
Refills: 0 | Status: DISCONTINUED | OUTPATIENT
Start: 2023-10-04 | End: 2023-10-06

## 2023-10-03 RX ORDER — CHLORHEXIDINE GLUCONATE 213 G/1000ML
1 SOLUTION TOPICAL ONCE
Refills: 0 | Status: COMPLETED | OUTPATIENT
Start: 2023-10-03 | End: 2023-10-03

## 2023-10-03 RX ORDER — HYDROMORPHONE HYDROCHLORIDE 2 MG/ML
0.5 INJECTION INTRAMUSCULAR; INTRAVENOUS; SUBCUTANEOUS
Refills: 0 | Status: DISCONTINUED | OUTPATIENT
Start: 2023-10-03 | End: 2023-10-04

## 2023-10-03 RX ORDER — OXYCODONE HYDROCHLORIDE 5 MG/1
5 TABLET ORAL EVERY 4 HOURS
Refills: 0 | Status: DISCONTINUED | OUTPATIENT
Start: 2023-10-03 | End: 2023-10-06

## 2023-10-03 RX ORDER — ACETAMINOPHEN 500 MG
975 TABLET ORAL ONCE
Refills: 0 | Status: COMPLETED | OUTPATIENT
Start: 2023-10-03 | End: 2023-10-03

## 2023-10-03 RX ORDER — MULTIVIT-MIN/FERROUS GLUCONATE 9 MG/15 ML
1 LIQUID (ML) ORAL
Refills: 0 | DISCHARGE

## 2023-10-03 RX ORDER — TRAMADOL HYDROCHLORIDE 50 MG/1
25 TABLET ORAL EVERY 8 HOURS
Refills: 0 | Status: DISCONTINUED | OUTPATIENT
Start: 2023-10-03 | End: 2023-10-06

## 2023-10-03 RX ORDER — ATORVASTATIN CALCIUM 80 MG/1
10 TABLET, FILM COATED ORAL AT BEDTIME
Refills: 0 | Status: DISCONTINUED | OUTPATIENT
Start: 2023-10-03 | End: 2023-10-06

## 2023-10-03 RX ORDER — ONDANSETRON 8 MG/1
4 TABLET, FILM COATED ORAL ONCE
Refills: 0 | Status: DISCONTINUED | OUTPATIENT
Start: 2023-10-03 | End: 2023-10-04

## 2023-10-03 RX ORDER — MAGNESIUM HYDROXIDE 400 MG/1
30 TABLET, CHEWABLE ORAL EVERY 12 HOURS
Refills: 0 | Status: DISCONTINUED | OUTPATIENT
Start: 2023-10-03 | End: 2023-10-06

## 2023-10-03 RX ORDER — POLYETHYLENE GLYCOL 3350 17 G/17G
17 POWDER, FOR SOLUTION ORAL DAILY
Refills: 0 | Status: DISCONTINUED | OUTPATIENT
Start: 2023-10-03 | End: 2023-10-06

## 2023-10-03 RX ORDER — PANTOPRAZOLE SODIUM 20 MG/1
40 TABLET, DELAYED RELEASE ORAL
Refills: 0 | Status: DISCONTINUED | OUTPATIENT
Start: 2023-10-03 | End: 2023-10-06

## 2023-10-03 RX ORDER — ONDANSETRON 8 MG/1
4 TABLET, FILM COATED ORAL EVERY 6 HOURS
Refills: 0 | Status: DISCONTINUED | OUTPATIENT
Start: 2023-10-03 | End: 2023-10-06

## 2023-10-03 RX ORDER — CEFAZOLIN SODIUM 1 G
2000 VIAL (EA) INJECTION EVERY 8 HOURS
Refills: 0 | Status: COMPLETED | OUTPATIENT
Start: 2023-10-03 | End: 2023-10-04

## 2023-10-03 RX ORDER — PANTOPRAZOLE SODIUM 20 MG/1
40 TABLET, DELAYED RELEASE ORAL ONCE
Refills: 0 | Status: COMPLETED | OUTPATIENT
Start: 2023-10-03 | End: 2023-10-03

## 2023-10-03 RX ORDER — SENNA PLUS 8.6 MG/1
2 TABLET ORAL AT BEDTIME
Refills: 0 | Status: DISCONTINUED | OUTPATIENT
Start: 2023-10-03 | End: 2023-10-06

## 2023-10-03 RX ORDER — GABAPENTIN 400 MG/1
1 CAPSULE ORAL
Refills: 0 | DISCHARGE

## 2023-10-03 RX ORDER — SODIUM CHLORIDE 9 MG/ML
500 INJECTION, SOLUTION INTRAVENOUS ONCE
Refills: 0 | Status: COMPLETED | OUTPATIENT
Start: 2023-10-03 | End: 2023-10-03

## 2023-10-03 RX ORDER — SODIUM CHLORIDE 9 MG/ML
500 INJECTION, SOLUTION INTRAVENOUS ONCE
Refills: 0 | Status: COMPLETED | OUTPATIENT
Start: 2023-10-04 | End: 2023-10-04

## 2023-10-03 RX ORDER — ATORVASTATIN CALCIUM 80 MG/1
1 TABLET, FILM COATED ORAL
Refills: 0 | DISCHARGE

## 2023-10-03 RX ORDER — CYCLOBENZAPRINE HYDROCHLORIDE 10 MG/1
5 TABLET, FILM COATED ORAL EVERY 8 HOURS
Refills: 0 | Status: DISCONTINUED | OUTPATIENT
Start: 2023-10-03 | End: 2023-10-06

## 2023-10-03 RX ADMIN — TRAMADOL HYDROCHLORIDE 25 MILLIGRAM(S): 50 TABLET ORAL at 11:33

## 2023-10-03 RX ADMIN — PANTOPRAZOLE SODIUM 40 MILLIGRAM(S): 20 TABLET, DELAYED RELEASE ORAL at 11:33

## 2023-10-03 RX ADMIN — SODIUM CHLORIDE 500 MILLILITER(S): 9 INJECTION, SOLUTION INTRAVENOUS at 19:20

## 2023-10-03 RX ADMIN — ATORVASTATIN CALCIUM 10 MILLIGRAM(S): 80 TABLET, FILM COATED ORAL at 22:00

## 2023-10-03 RX ADMIN — Medication 100 MILLIGRAM(S): at 22:00

## 2023-10-03 RX ADMIN — CHLORHEXIDINE GLUCONATE 1 APPLICATION(S): 213 SOLUTION TOPICAL at 11:34

## 2023-10-03 RX ADMIN — Medication 75 MILLIGRAM(S): at 11:33

## 2023-10-03 RX ADMIN — Medication 975 MILLIGRAM(S): at 21:55

## 2023-10-03 RX ADMIN — Medication 975 MILLIGRAM(S): at 11:32

## 2023-10-03 RX ADMIN — SENNA PLUS 2 TABLET(S): 8.6 TABLET ORAL at 22:00

## 2023-10-03 RX ADMIN — TRAMADOL HYDROCHLORIDE 25 MILLIGRAM(S): 50 TABLET ORAL at 22:41

## 2023-10-03 RX ADMIN — Medication 975 MILLIGRAM(S): at 22:41

## 2023-10-03 RX ADMIN — TRAMADOL HYDROCHLORIDE 25 MILLIGRAM(S): 50 TABLET ORAL at 22:00

## 2023-10-03 NOTE — PROGRESS NOTE ADULT - ASSESSMENT
A/P: Patient is a 78y y/o Female s/p C3-7 posterior cervical decompression and fusion, POD #0   - Pain control  - Antibiotics - Ancef postop  - Incentive spirometry  - Venodynes  - F/U AM Labs  - PT/OT/WBAT  - Harrison - monitor output  - Monitor drain output  - Notify orthopedics with any questions A/P: Patient is a 78y y/o Female s/p C3-7 posterior cervical decompression and fusion, POD #0   - Pain control  - Antibiotics - Ancef postop  - Incentive spirometry  - Venodynes  - F/U AM Labs  - PT/OT/WBAT  - Harrison - monitor output  - Monitor drain output  - Notify orthopedics with any questions    Patient doing well, incisional pain controlled. I discussed with the above, Dr. Dieter Carbajal.

## 2023-10-03 NOTE — BRIEF OPERATIVE NOTE - NSICDXBRIEFPROCEDURE_GEN_ALL_CORE_FT
PROCEDURES:  Fusion, spine, cervical, 4 levels, posterior approach 03-Oct-2023 16:50:21  Alyssa Lawrence

## 2023-10-03 NOTE — PROGRESS NOTE ADULT - SUBJECTIVE AND OBJECTIVE BOX
Orthopedics Post-Op Note:    Patient was seen and examined at bedside. Denies CP/SOB/Dizziness, N/V/D, HA. Pain is controlled.     Vital Signs Last 24 Hrs  T(C): 36.8 (03 Oct 2023 19:00), Max: 37.6 (03 Oct 2023 10:22)  T(F): 98.2 (03 Oct 2023 19:00), Max: 99.6 (03 Oct 2023 10:22)  HR: 89 (03 Oct 2023 19:00) (89 - 112)  BP: 108/58 (03 Oct 2023 19:00) (106/57 - 134/72)  BP(mean): 69 (03 Oct 2023 19:00) (69 - 92)  RR: 14 (03 Oct 2023 19:00) (11 - 106)  SpO2: 95% (03 Oct 2023 19:00) (94% - 98%)    Parameters below as of 03 Oct 2023 19:00  Patient On (Oxygen Delivery Method): room air    Labs:                        11.4   10.29 )-----------( 186      ( 03 Oct 2023 17:00 )             34.2     10-03    139  |  104  |  8   ----------------------------<  168<H>  3.5   |  23  |  0.58    Ca    8.5      03 Oct 2023 17:00    Physical Exam:  Gen: NAD, A&Ox3  NECK: Dressing C/D/I. Drain in place (sewn), C-collar in place (aspen)    R UE:  Deltoid 5/5  Biceps 5/5  Triceps 5/5  Wrist extension 5/5   strength 5/5  Sensation intact to light touch, radial pulse 2+  Compartments are soft, extremities are warm.     L UE:  Deltoid 5/5  Biceps 5/5  Triceps 5/5  Wrist extension 5/5   strength 5/5  Sensation intact to light touch, radial pulse 2+  Compartments are soft, extremities are warm.

## 2023-10-04 DIAGNOSIS — D62 ACUTE POSTHEMORRHAGIC ANEMIA: ICD-10-CM

## 2023-10-04 DIAGNOSIS — Z29.9 ENCOUNTER FOR PROPHYLACTIC MEASURES, UNSPECIFIED: ICD-10-CM

## 2023-10-04 DIAGNOSIS — R00.0 TACHYCARDIA, UNSPECIFIED: ICD-10-CM

## 2023-10-04 DIAGNOSIS — D72.829 ELEVATED WHITE BLOOD CELL COUNT, UNSPECIFIED: ICD-10-CM

## 2023-10-04 LAB
ANION GAP SERPL CALC-SCNC: 9 MMOL/L — SIGNIFICANT CHANGE UP (ref 7–14)
BASOPHILS # BLD AUTO: 0.01 K/UL — SIGNIFICANT CHANGE UP (ref 0–0.2)
BASOPHILS NFR BLD AUTO: 0.1 % — SIGNIFICANT CHANGE UP (ref 0–2)
BUN SERPL-MCNC: 7 MG/DL — SIGNIFICANT CHANGE UP (ref 7–23)
CALCIUM SERPL-MCNC: 8.9 MG/DL — SIGNIFICANT CHANGE UP (ref 8.4–10.5)
CHLORIDE SERPL-SCNC: 100 MMOL/L — SIGNIFICANT CHANGE UP (ref 98–107)
CO2 SERPL-SCNC: 27 MMOL/L — SIGNIFICANT CHANGE UP (ref 22–31)
CREAT SERPL-MCNC: 0.61 MG/DL — SIGNIFICANT CHANGE UP (ref 0.5–1.3)
EGFR: 91 ML/MIN/1.73M2 — SIGNIFICANT CHANGE UP
EOSINOPHIL # BLD AUTO: 0 K/UL — SIGNIFICANT CHANGE UP (ref 0–0.5)
EOSINOPHIL NFR BLD AUTO: 0 % — SIGNIFICANT CHANGE UP (ref 0–6)
GLUCOSE SERPL-MCNC: 146 MG/DL — HIGH (ref 70–99)
HCT VFR BLD CALC: 34.4 % — LOW (ref 34.5–45)
HGB BLD-MCNC: 11.4 G/DL — LOW (ref 11.5–15.5)
IANC: 9.88 K/UL — HIGH (ref 1.8–7.4)
IMM GRANULOCYTES NFR BLD AUTO: 0.4 % — SIGNIFICANT CHANGE UP (ref 0–0.9)
LYMPHOCYTES # BLD AUTO: 0.89 K/UL — LOW (ref 1–3.3)
LYMPHOCYTES # BLD AUTO: 7.8 % — LOW (ref 13–44)
MCHC RBC-ENTMCNC: 31 PG — SIGNIFICANT CHANGE UP (ref 27–34)
MCHC RBC-ENTMCNC: 33.1 GM/DL — SIGNIFICANT CHANGE UP (ref 32–36)
MCV RBC AUTO: 93.5 FL — SIGNIFICANT CHANGE UP (ref 80–100)
MONOCYTES # BLD AUTO: 0.64 K/UL — SIGNIFICANT CHANGE UP (ref 0–0.9)
MONOCYTES NFR BLD AUTO: 5.6 % — SIGNIFICANT CHANGE UP (ref 2–14)
NEUTROPHILS # BLD AUTO: 9.88 K/UL — HIGH (ref 1.8–7.4)
NEUTROPHILS NFR BLD AUTO: 86.1 % — HIGH (ref 43–77)
NRBC # BLD: 0 /100 WBCS — SIGNIFICANT CHANGE UP (ref 0–0)
NRBC # FLD: 0 K/UL — SIGNIFICANT CHANGE UP (ref 0–0)
PLATELET # BLD AUTO: 195 K/UL — SIGNIFICANT CHANGE UP (ref 150–400)
POTASSIUM SERPL-MCNC: 4.2 MMOL/L — SIGNIFICANT CHANGE UP (ref 3.5–5.3)
POTASSIUM SERPL-SCNC: 4.2 MMOL/L — SIGNIFICANT CHANGE UP (ref 3.5–5.3)
RBC # BLD: 3.68 M/UL — LOW (ref 3.8–5.2)
RBC # FLD: 11.3 % — SIGNIFICANT CHANGE UP (ref 10.3–14.5)
SODIUM SERPL-SCNC: 136 MMOL/L — SIGNIFICANT CHANGE UP (ref 135–145)
WBC # BLD: 11.47 K/UL — HIGH (ref 3.8–10.5)
WBC # FLD AUTO: 11.47 K/UL — HIGH (ref 3.8–10.5)

## 2023-10-04 PROCEDURE — 93010 ELECTROCARDIOGRAM REPORT: CPT

## 2023-10-04 PROCEDURE — 71275 CT ANGIOGRAPHY CHEST: CPT | Mod: 26

## 2023-10-04 PROCEDURE — 99233 SBSQ HOSP IP/OBS HIGH 50: CPT

## 2023-10-04 RX ORDER — SODIUM CHLORIDE 9 MG/ML
500 INJECTION, SOLUTION INTRAVENOUS ONCE
Refills: 0 | Status: COMPLETED | OUTPATIENT
Start: 2023-10-04 | End: 2023-10-04

## 2023-10-04 RX ORDER — INFLUENZA VIRUS VACCINE 15; 15; 15; 15 UG/.5ML; UG/.5ML; UG/.5ML; UG/.5ML
0.7 SUSPENSION INTRAMUSCULAR ONCE
Refills: 0 | Status: COMPLETED | OUTPATIENT
Start: 2023-10-04 | End: 2023-10-04

## 2023-10-04 RX ADMIN — CYCLOBENZAPRINE HYDROCHLORIDE 5 MILLIGRAM(S): 10 TABLET, FILM COATED ORAL at 05:57

## 2023-10-04 RX ADMIN — Medication 975 MILLIGRAM(S): at 13:48

## 2023-10-04 RX ADMIN — TRAMADOL HYDROCHLORIDE 25 MILLIGRAM(S): 50 TABLET ORAL at 13:49

## 2023-10-04 RX ADMIN — CYCLOBENZAPRINE HYDROCHLORIDE 5 MILLIGRAM(S): 10 TABLET, FILM COATED ORAL at 19:29

## 2023-10-04 RX ADMIN — TRAMADOL HYDROCHLORIDE 25 MILLIGRAM(S): 50 TABLET ORAL at 21:16

## 2023-10-04 RX ADMIN — Medication 975 MILLIGRAM(S): at 22:00

## 2023-10-04 RX ADMIN — TRAMADOL HYDROCHLORIDE 25 MILLIGRAM(S): 50 TABLET ORAL at 22:00

## 2023-10-04 RX ADMIN — PANTOPRAZOLE SODIUM 40 MILLIGRAM(S): 20 TABLET, DELAYED RELEASE ORAL at 07:45

## 2023-10-04 RX ADMIN — ATORVASTATIN CALCIUM 10 MILLIGRAM(S): 80 TABLET, FILM COATED ORAL at 21:17

## 2023-10-04 RX ADMIN — SODIUM CHLORIDE 30 MILLILITER(S): 9 INJECTION, SOLUTION INTRAVENOUS at 17:46

## 2023-10-04 RX ADMIN — Medication 975 MILLIGRAM(S): at 21:16

## 2023-10-04 RX ADMIN — TRAMADOL HYDROCHLORIDE 25 MILLIGRAM(S): 50 TABLET ORAL at 06:36

## 2023-10-04 RX ADMIN — Medication 975 MILLIGRAM(S): at 05:45

## 2023-10-04 RX ADMIN — SENNA PLUS 2 TABLET(S): 8.6 TABLET ORAL at 21:16

## 2023-10-04 RX ADMIN — Medication 100 MILLIGRAM(S): at 05:44

## 2023-10-04 RX ADMIN — Medication 975 MILLIGRAM(S): at 06:35

## 2023-10-04 RX ADMIN — SODIUM CHLORIDE 500 MILLILITER(S): 9 INJECTION, SOLUTION INTRAVENOUS at 09:51

## 2023-10-04 RX ADMIN — OXYCODONE HYDROCHLORIDE 5 MILLIGRAM(S): 5 TABLET ORAL at 02:52

## 2023-10-04 RX ADMIN — GABAPENTIN 100 MILLIGRAM(S): 400 CAPSULE ORAL at 21:17

## 2023-10-04 RX ADMIN — POLYETHYLENE GLYCOL 3350 17 GRAM(S): 17 POWDER, FOR SOLUTION ORAL at 17:46

## 2023-10-04 RX ADMIN — TRAMADOL HYDROCHLORIDE 25 MILLIGRAM(S): 50 TABLET ORAL at 05:56

## 2023-10-04 RX ADMIN — SODIUM CHLORIDE 500 MILLILITER(S): 9 INJECTION, SOLUTION INTRAVENOUS at 06:27

## 2023-10-04 RX ADMIN — OXYCODONE HYDROCHLORIDE 5 MILLIGRAM(S): 5 TABLET ORAL at 01:51

## 2023-10-04 NOTE — PHYSICAL THERAPY INITIAL EVALUATION ADULT - PRECAUTIONS/LIMITATIONS, REHAB EVAL
fall precautions cervical collar for out of bed/fall precautions/spinal precautions/surgical precautions

## 2023-10-04 NOTE — PROGRESS NOTE ADULT - SUBJECTIVE AND OBJECTIVE BOX
Follow up with cardiology scheduled for 6 months    Orthopaedic Surgery Progress Note    Subjective:   Patient seen and examined. No acute events overnight. Mild incisional pain.     Objective:  T(C): 36.9 (10-04-23 @ 06:00), Max: 37.6 (10-03-23 @ 10:22)  HR: 87 (10-04-23 @ 06:00) (87 - 112)  BP: 125/67 (10-04-23 @ 06:00) (106/57 - 134/72)  RR: 15 (10-04-23 @ 06:00) (9 - 106)  SpO2: 96% (10-04-23 @ 06:00) (91% - 98%)  Wt(kg): --    10-03 @ 07:01  -  10-04 @ 07:00  --------------------------------------------------------  IN: 2080 mL / OUT: 2010 mL / NET: 70 mL        PE    NAD  Neck:   dressing C/D/I, c-spine collar in place  HV SS  Neuro:  RUE Delt 5/5 Bi 5/5 Tri 5/5 Wrist ext 5/5  5/5  SILT C5-T1  LUE Delt 5/5 Bi 5/5 Tri 5/5 Wrist ext 5/5  5/5  SILT C5-T1  Neg Solomon  BLLE  5/5 IP/Q/HS/GSC/EHL/TA  SILT L2-S1  WWP BL UE/LE                            11.4   11.47 )-----------( 195      ( 04 Oct 2023 05:35 )             34.4     10-04    136  |  100  |  7   ----------------------------<  146<H>  4.2   |  27  |  0.61    Ca    8.9      04 Oct 2023 05:35        Urinalysis Basic - ( 04 Oct 2023 05:35 )    Color: x / Appearance: x / SG: x / pH: x  Gluc: 146 mg/dL / Ketone: x  / Bili: x / Urobili: x   Blood: x / Protein: x / Nitrite: x   Leuk Esterase: x / RBC: x / WBC x   Sq Epi: x / Non Sq Epi: x / Bacteria: x        78y Female s/p C3-6 lami/PSF 10/3/23  - Pain control  - FU labs  - Harrison out when OOB  - c-spine collar ghassan OOB  - WBAT  - PT/OT/OOB  - I/S  - SCDs  - Dispo planning pending PT eval Orthopaedic Surgery Progress Note    Subjective:   Patient seen and examined. No acute events overnight. Mild incisional pain.     Objective:  T(C): 36.9 (10-04-23 @ 06:00), Max: 37.6 (10-03-23 @ 10:22)  HR: 87 (10-04-23 @ 06:00) (87 - 112)  BP: 125/67 (10-04-23 @ 06:00) (106/57 - 134/72)  RR: 15 (10-04-23 @ 06:00) (9 - 106)  SpO2: 96% (10-04-23 @ 06:00) (91% - 98%)  Wt(kg): --    10-03 @ 07:01  -  10-04 @ 07:00  --------------------------------------------------------  IN: 2080 mL / OUT: 2010 mL / NET: 70 mL        PE    NAD  Neck:   dressing C/D/I, c-spine collar in place  HV SS  Neuro:  RUE Delt 5/5 Bi 5/5 Tri 5/5 Wrist ext 5/5  5/5  SILT C5-T1  LUE Delt 5/5 Bi 5/5 Tri 5/5 Wrist ext 5/5  5/5  SILT C5-T1  Neg Solomon  BLLE  5/5 IP/Q/HS/GSC/EHL/TA  SILT L2-S1  WWP BL UE/LE                            11.4   11.47 )-----------( 195      ( 04 Oct 2023 05:35 )             34.4     10-04    136  |  100  |  7   ----------------------------<  146<H>  4.2   |  27  |  0.61    Ca    8.9      04 Oct 2023 05:35        Urinalysis Basic - ( 04 Oct 2023 05:35 )    Color: x / Appearance: x / SG: x / pH: x  Gluc: 146 mg/dL / Ketone: x  / Bili: x / Urobili: x   Blood: x / Protein: x / Nitrite: x   Leuk Esterase: x / RBC: x / WBC x   Sq Epi: x / Non Sq Epi: x / Bacteria: x        78y Female s/p C3-6 lami/PSF 10/3/23  - Pain control  - FU labs  - Harrison out when OOB  - c-spine collar whnitzan OOB  - WBAT  - PT/OT/OOB  - I/S  - SCDs  - Dispo planning pending PT eval    Patient doing well, PT today, D/C Hunter. I discussed with the above, discussed with family member last PM, Dr. Dieter Carbajal.

## 2023-10-04 NOTE — PATIENT PROFILE ADULT - INTERNATIONAL TRAVEL
[FreeTextEntry1] : EMG on 12/21/22 - left C4, C5, C6 cervical radiculopathy, no peripheral compression\par \par On my interpretation of these images from [facility] on 11/23/22\par C2-3: normal \par C3-4: left sided disc herniation causing moderate central and moderate to severe foraminal stenosis with a mild left sided chord compression \par C4-5: S/p ACDF appears well decompressed \par C5-6: S/p ACDF appears well decompressed\par C6-7: normal\par C7-T1: normal \par T1-2: small disc herniation  No

## 2023-10-04 NOTE — CONSULT NOTE ADULT - PROBLEM SELECTOR RECOMMENDATION 9
-Pain well controlled; continue management and pain control per ortho recs with neurontin qhs, tylenol tid, flexeril prn, dilaudid prn, tramadol tid and oxycodone IR prn  -c/w bowel regimen  -c/w incentive spirometer use  -c/w PT

## 2023-10-04 NOTE — PHYSICAL THERAPY INITIAL EVALUATION ADULT - NSPTDISCHREC_GEN_A_CORE
Home and patient to follow up for outpatient PT script at surgeon's office upon follow up visit if needed.

## 2023-10-04 NOTE — CONSULT NOTE ADULT - SUBJECTIVE AND OBJECTIVE BOX
CHIEF COMPLAINT: Patient is a 78y old  Female who presents with a chief complaint of s/p C3-7 posterior cervical decompression and fusion (03 Oct 2023 20:09)    HPI: 78F h/o left eye glaucoma, right eye blindness (pt wears prosthesis on her right eye), arthritis, hypercholesteremia, colon cancer, h/o appendicitis, OA and lumbar stenosis, colon ca s/p partial colectomy (2019), right eye blindness (from childhood), presents for pre-op evaluation for diagnosis of Spinal stenosis cervical region. Patient is scheduled for C3-6 Posterior cervical fusion. Patient has tried other modalities such as NSAID, PT, and steroid injections without relief.  (19 Sep 2023 10:14)  Patient tolerated procedure well. Denies any F/C/N/V, CP or SOB.  Per primary team, patient had a panic attack earlier this morning in the PACU which has since self-resolved. Patient has been tachycardic post-op and c/o mild incisional pain.     Allergies: No Known Allergies    HOME MEDICATIONS: [x] Reviewed    MEDICATIONS  (STANDING):  acetaminophen     Tablet .. 975 milliGRAM(s) Oral every 8 hours  atorvastatin 10 milliGRAM(s) Oral at bedtime  gabapentin 300 milliGRAM(s) Oral at bedtime  influenza  Vaccine (HIGH DOSE) 0.7 milliLiter(s) IntraMuscular once  lactated ringers. 1000 milliLiter(s) (30 mL/Hr) IV Continuous <Continuous>  pantoprazole    Tablet 40 milliGRAM(s) Oral before breakfast  polyethylene glycol 3350 17 Gram(s) Oral daily  senna 2 Tablet(s) Oral at bedtime  traMADol 25 milliGRAM(s) Oral every 8 hours    MEDICATIONS  (PRN):  bisacodyl 5 milliGRAM(s) Oral every 12 hours PRN Constipation  cyclobenzaprine 5 milliGRAM(s) Oral every 8 hours PRN Muscle Spasm  HYDROmorphone  Injectable 0.25 milliGRAM(s) IV Push every 10 minutes PRN Moderate Pain (4 - 6)  HYDROmorphone  Injectable 0.5 milliGRAM(s) IV Push every 10 minutes PRN Severe Pain (7 - 10)  magnesium hydroxide Suspension 30 milliLiter(s) Oral every 12 hours PRN Constipation  ondansetron Injectable 4 milliGRAM(s) IV Push every 6 hours PRN Nausea and/or Vomiting  ondansetron Injectable 4 milliGRAM(s) IV Push once PRN Nausea and/or Vomiting  oxyCODONE    IR 2.5 milliGRAM(s) Oral every 4 hours PRN Moderate Pain (4 - 6)  oxyCODONE    IR 5 milliGRAM(s) Oral every 4 hours PRN Severe Pain (7 - 10)    PAST MEDICAL & SURGICAL HISTORY:  Glaucoma left eye  Blindness right eye, pt wears prosthesis on her right eye  Arthritis  Hypercholesteremia  Colon cancer  History of appendicitis  Osteoarthritis  Lumbar stenosis  Spinal stenosis, cervical region  History of cholecystectomy 15 yrs ago  H/O colonoscopy   delivery delivered many yrs ago with tubal ligation  H/O abdominoplasty many yrs ago  S/P laparoscopic appendectomy 19  History of partial colectomy  [x ] Reviewed     SOCIAL HISTORY:  Substance Use History:  · Substance Use	caffeine  · Caffeine Type	tea  · Caffeine Amount/Frequency	1-2 cups/cans per day  · Substance Use Comment	denies drug use    Alcohol Use History:  · Have you ever consumed alcohol	yes...  · Alcohol Type	occasional  · Alcohol Frequency	monthly or less  · Alcohol Amount	1-2 drinks  · Problems Related to Alcohol Use	no  · 1. Have you felt you ought to CUT down on your drinking?	no  · 2. Have people ANNOYED you by criticizing your drinking?	no  · 3. Have you ever felt bad or GUILTY about your drinking?	no  · 4. Have you ever needed an "EYE OPENER", a drink first thing in the morning to steady your nerves or get rid of a hangover?	no    Tobacco Usage:  · Tobacco Usage: Former smoker  · Tobacco Type: cigarettes  Quit 51 years ago  · Average Number of Packs per Day: 1  · Number of yrs: 5  · Pack yrs: 5    FAMILY HISTORY:  No pertinent family history  [x] No pertinent family history in first degree relatives     REVIEW OF SYSTEMS:  [x] All other ROS negative  [  ] Unable to obtain due to poor mental status    Vital Signs Last 24 Hrs  T(C): 36.8 (04 Oct 2023 13:00), Max: 37.2 (03 Oct 2023 16:15)  T(F): 98.2 (04 Oct 2023 13:00), Max: 99 (03 Oct 2023 16:15)  HR: 102 (04 Oct 2023 14:00) (87 - 133)  BP: 125/70 (04 Oct 2023 13:00) (106/57 - 139/65)  BP(mean): 83 (04 Oct 2023 13:00) (64 - 92)  RR: 12 (04 Oct 2023 14:00) (9 - 20)  SpO2: 99% (04 Oct 2023 14:00) (91% - 100%)    Parameters below as of 04 Oct 2023 11:00  Patient On (Oxygen Delivery Method): room air    PHYSICAL EXAM:  GENERAL: NAD  HEAD:  Atraumatic, Normocephalic  EYES: EOMI, PERRLA, conjunctiva and sclera clear  ENMT: Moist mucous membranes  NECK: Supple, No JVD  RESPIRATORY: Clear to auscultation bilaterally; No rales, rhonchi, wheezing, or rubs  CARDIOVASCULAR: Regular rate and rhythm; No murmurs, rubs, or gallops  GASTROINTESTINAL: Soft, Nontender, Nondistended; Bowel sounds present  EXTREMITIES:  2+ Peripheral Pulses, No clubbing, cyanosis, or edema  NERVOUS SYSTEM:  Alert & Oriented X3; Moving all 4 extremities; No gross sensory deficits  SKIN: Dressing C/D/I    LABS:                        11.4   11.47 )-----------( 195      ( 04 Oct 2023 05:35 )             34.4     Hemoglobin: 11.4 g/dL (10-04 @ 05:35)  Hemoglobin: 11.4 g/dL (10-03 @ 17:00)    10-04    136  |  100  |  7   ----------------------------<  146<H>  4.2   |  27  |  0.61    Ca    8.9      04 Oct 2023 05:35    Urinalysis Basic - ( 04 Oct 2023 05:35 )  Color: x / Appearance: x / SG: x / pH: x  Gluc: 146 mg/dL / Ketone: x  / Bili: x / Urobili: x   Blood: x / Protein: x / Nitrite: x   Leuk Esterase: x / RBC: x / WBC x   Sq Epi: x / Non Sq Epi: x / Bacteria: x    RADIOLOGY & ADDITIONAL STUDIES:  Imaging:   Personally Reviewed:  [x] YES   < from: Xray Cervical Spine AP and Lateral Only (10.03.23 @ 13:45) >  IMPRESSION:  Mid and lower portions of cervical spine obscured by superimposed   shoulders.    Distal tip of a surgical clamp projects over C3 spinous process.    Also correlate with preoperative workup and intraoperative findings.    < end of copied text >              [ ] Consultant(s) Notes Reviewed  [x] Care Discussed with Consultants/Other Providers: Ortho PA - discussed management of tachycardia and obtaining CTPA

## 2023-10-04 NOTE — PATIENT PROFILE ADULT - FALL HARM RISK - UNIVERSAL INTERVENTIONS
Bed in lowest position, wheels locked, appropriate side rails in place/Call bell, personal items and telephone in reach/Instruct patient to call for assistance before getting out of bed or chair/Non-slip footwear when patient is out of bed/[place] to call system/Physically safe environment - no spills, clutter or unnecessary equipment/Purposeful Proactive Rounding/Room/bathroom lighting operational, light cord in reach

## 2023-10-04 NOTE — PHYSICAL THERAPY INITIAL EVALUATION ADULT - ADDITIONAL COMMENTS
Pt states she will be living with her daughter who has 4 steps at home. At her own house, pt has 1 flight of steps +handrail. Prior to admission, pt was ambulating independently without any assistive devices.  Post PT evaluation, pt left semi-supine, alarm on, call bell and remote within reach, all precautions maintained, NAD. RN aware.

## 2023-10-04 NOTE — CONSULT NOTE ADULT - TIME BILLING

## 2023-10-04 NOTE — OCCUPATIONAL THERAPY INITIAL EVALUATION ADULT - LIVES WITH, PROFILE
Pt presented with no functional deficits, performed bed mobility, transfers and ambulation with no assistive device needed, balance is normal.
Patient lives in a private home with 12 steps to bedroom. Reports she will be staying with her daughter upon discharge in a house with 4 steps to enter.

## 2023-10-04 NOTE — CONSULT NOTE ADULT - ASSESSMENT
78F h/o left eye glaucoma, right eye blindness (pt wears prosthesis on her right eye), arthritis, hypercholesteremia, colon cancer, h/o appendicitis, OA and lumbar stenosis, colon ca s/p partial colectomy (2019), right eye blindness (from childhood) p/w spinal stenosis of the cervical region now s/p C3-6 Posterior cervical fusion POD#1 course c/b post-op tachycardia.

## 2023-10-04 NOTE — OCCUPATIONAL THERAPY INITIAL EVALUATION ADULT - GENERAL OBSERVATIONS, REHAB EVAL
Patient received semisupine in bed outside room in [facility]; agreeable to participate in OT evaluation. +cervical collar. +hemovac. BP: 122/63 mmHg.

## 2023-10-04 NOTE — OCCUPATIONAL THERAPY INITIAL EVALUATION ADULT - PERTINENT HX OF CURRENT PROBLEM, REHAB EVAL
78 year old female with pmhx of HLD, Lumbar stenosis, Colon ca s/p partial colectomy (2019), Right eye blindness (from childhood), presents for pre-op evaluation for diagnosis of Spinal stenosis cervical region. Patient s/p C3-7 posterior cervical decompression and fusion on 10/3/23.

## 2023-10-04 NOTE — OCCUPATIONAL THERAPY INITIAL EVALUATION ADULT - DIAGNOSIS, OT EVAL
s/p C3-7 posterior cervical decompression and fusion; decreased functional mobility, decreased ADL performance

## 2023-10-05 LAB
ANION GAP SERPL CALC-SCNC: 11 MMOL/L — SIGNIFICANT CHANGE UP (ref 7–14)
BASOPHILS # BLD AUTO: 0.01 K/UL — SIGNIFICANT CHANGE UP (ref 0–0.2)
BASOPHILS NFR BLD AUTO: 0.1 % — SIGNIFICANT CHANGE UP (ref 0–2)
BUN SERPL-MCNC: 6 MG/DL — LOW (ref 7–23)
CALCIUM SERPL-MCNC: 9.1 MG/DL — SIGNIFICANT CHANGE UP (ref 8.4–10.5)
CHLORIDE SERPL-SCNC: 102 MMOL/L — SIGNIFICANT CHANGE UP (ref 98–107)
CO2 SERPL-SCNC: 26 MMOL/L — SIGNIFICANT CHANGE UP (ref 22–31)
CREAT SERPL-MCNC: 0.48 MG/DL — LOW (ref 0.5–1.3)
EGFR: 97 ML/MIN/1.73M2 — SIGNIFICANT CHANGE UP
EOSINOPHIL # BLD AUTO: 0.01 K/UL — SIGNIFICANT CHANGE UP (ref 0–0.5)
EOSINOPHIL NFR BLD AUTO: 0.1 % — SIGNIFICANT CHANGE UP (ref 0–6)
GLUCOSE SERPL-MCNC: 133 MG/DL — HIGH (ref 70–99)
HCT VFR BLD CALC: 35.2 % — SIGNIFICANT CHANGE UP (ref 34.5–45)
HGB BLD-MCNC: 11.5 G/DL — SIGNIFICANT CHANGE UP (ref 11.5–15.5)
IANC: 10.17 K/UL — HIGH (ref 1.8–7.4)
IMM GRANULOCYTES NFR BLD AUTO: 0.3 % — SIGNIFICANT CHANGE UP (ref 0–0.9)
LYMPHOCYTES # BLD AUTO: 0.86 K/UL — LOW (ref 1–3.3)
LYMPHOCYTES # BLD AUTO: 7.2 % — LOW (ref 13–44)
MCHC RBC-ENTMCNC: 30.9 PG — SIGNIFICANT CHANGE UP (ref 27–34)
MCHC RBC-ENTMCNC: 32.7 GM/DL — SIGNIFICANT CHANGE UP (ref 32–36)
MCV RBC AUTO: 94.6 FL — SIGNIFICANT CHANGE UP (ref 80–100)
MONOCYTES # BLD AUTO: 0.82 K/UL — SIGNIFICANT CHANGE UP (ref 0–0.9)
MONOCYTES NFR BLD AUTO: 6.9 % — SIGNIFICANT CHANGE UP (ref 2–14)
NEUTROPHILS # BLD AUTO: 10.17 K/UL — HIGH (ref 1.8–7.4)
NEUTROPHILS NFR BLD AUTO: 85.4 % — HIGH (ref 43–77)
NRBC # BLD: 0 /100 WBCS — SIGNIFICANT CHANGE UP (ref 0–0)
NRBC # FLD: 0 K/UL — SIGNIFICANT CHANGE UP (ref 0–0)
PLATELET # BLD AUTO: 208 K/UL — SIGNIFICANT CHANGE UP (ref 150–400)
POTASSIUM SERPL-MCNC: 3.7 MMOL/L — SIGNIFICANT CHANGE UP (ref 3.5–5.3)
POTASSIUM SERPL-SCNC: 3.7 MMOL/L — SIGNIFICANT CHANGE UP (ref 3.5–5.3)
RBC # BLD: 3.72 M/UL — LOW (ref 3.8–5.2)
RBC # FLD: 11.5 % — SIGNIFICANT CHANGE UP (ref 10.3–14.5)
SARS-COV-2 RNA SPEC QL NAA+PROBE: DETECTED
SODIUM SERPL-SCNC: 139 MMOL/L — SIGNIFICANT CHANGE UP (ref 135–145)
WBC # BLD: 11.91 K/UL — HIGH (ref 3.8–10.5)
WBC # FLD AUTO: 11.91 K/UL — HIGH (ref 3.8–10.5)

## 2023-10-05 PROCEDURE — 99233 SBSQ HOSP IP/OBS HIGH 50: CPT

## 2023-10-05 RX ADMIN — GABAPENTIN 300 MILLIGRAM(S): 400 CAPSULE ORAL at 22:07

## 2023-10-05 RX ADMIN — TRAMADOL HYDROCHLORIDE 25 MILLIGRAM(S): 50 TABLET ORAL at 13:45

## 2023-10-05 RX ADMIN — Medication 975 MILLIGRAM(S): at 06:13

## 2023-10-05 RX ADMIN — ATORVASTATIN CALCIUM 10 MILLIGRAM(S): 80 TABLET, FILM COATED ORAL at 21:57

## 2023-10-05 RX ADMIN — PANTOPRAZOLE SODIUM 40 MILLIGRAM(S): 20 TABLET, DELAYED RELEASE ORAL at 05:10

## 2023-10-05 RX ADMIN — Medication 975 MILLIGRAM(S): at 05:10

## 2023-10-05 RX ADMIN — Medication 975 MILLIGRAM(S): at 13:15

## 2023-10-05 RX ADMIN — Medication 975 MILLIGRAM(S): at 22:07

## 2023-10-05 RX ADMIN — OXYCODONE HYDROCHLORIDE 5 MILLIGRAM(S): 5 TABLET ORAL at 05:10

## 2023-10-05 RX ADMIN — OXYCODONE HYDROCHLORIDE 5 MILLIGRAM(S): 5 TABLET ORAL at 00:40

## 2023-10-05 RX ADMIN — SENNA PLUS 2 TABLET(S): 8.6 TABLET ORAL at 21:57

## 2023-10-05 RX ADMIN — TRAMADOL HYDROCHLORIDE 25 MILLIGRAM(S): 50 TABLET ORAL at 21:59

## 2023-10-05 RX ADMIN — TRAMADOL HYDROCHLORIDE 25 MILLIGRAM(S): 50 TABLET ORAL at 22:07

## 2023-10-05 RX ADMIN — POLYETHYLENE GLYCOL 3350 17 GRAM(S): 17 POWDER, FOR SOLUTION ORAL at 13:08

## 2023-10-05 RX ADMIN — OXYCODONE HYDROCHLORIDE 5 MILLIGRAM(S): 5 TABLET ORAL at 01:40

## 2023-10-05 RX ADMIN — CYCLOBENZAPRINE HYDROCHLORIDE 5 MILLIGRAM(S): 10 TABLET, FILM COATED ORAL at 05:10

## 2023-10-05 RX ADMIN — Medication 975 MILLIGRAM(S): at 13:45

## 2023-10-05 RX ADMIN — Medication 975 MILLIGRAM(S): at 21:56

## 2023-10-05 RX ADMIN — TRAMADOL HYDROCHLORIDE 25 MILLIGRAM(S): 50 TABLET ORAL at 13:15

## 2023-10-05 RX ADMIN — OXYCODONE HYDROCHLORIDE 5 MILLIGRAM(S): 5 TABLET ORAL at 06:13

## 2023-10-05 NOTE — PROGRESS NOTE ADULT - SUBJECTIVE AND OBJECTIVE BOX
CHIEF COMPLAINT: f/u spinal surgery    SUBJECTIVE / OVERNIGHT EVENTS: Patient seen and examined. No acute events overnight. Pain well controlled and patient without any complaints. Walking with physical therapy.    MEDICATIONS  (STANDING):  acetaminophen     Tablet .. 975 milliGRAM(s) Oral every 8 hours  atorvastatin 10 milliGRAM(s) Oral at bedtime  gabapentin 300 milliGRAM(s) Oral at bedtime  influenza  Vaccine (HIGH DOSE) 0.7 milliLiter(s) IntraMuscular once  lactated ringers. 1000 milliLiter(s) (30 mL/Hr) IV Continuous <Continuous>  pantoprazole    Tablet 40 milliGRAM(s) Oral before breakfast  polyethylene glycol 3350 17 Gram(s) Oral daily  senna 2 Tablet(s) Oral at bedtime  traMADol 25 milliGRAM(s) Oral every 8 hours    MEDICATIONS  (PRN):  bisacodyl 5 milliGRAM(s) Oral every 12 hours PRN Constipation  cyclobenzaprine 5 milliGRAM(s) Oral every 8 hours PRN Muscle Spasm  magnesium hydroxide Suspension 30 milliLiter(s) Oral every 12 hours PRN Constipation  ondansetron Injectable 4 milliGRAM(s) IV Push every 6 hours PRN Nausea and/or Vomiting  oxyCODONE    IR 2.5 milliGRAM(s) Oral every 4 hours PRN Moderate Pain (4 - 6)  oxyCODONE    IR 5 milliGRAM(s) Oral every 4 hours PRN Severe Pain (7 - 10)    VITALS:  T(F): 98.2 (10-05-23 @ 09:36), Max: 98.4 (10-04-23 @ 15:15)  HR: 96 (10-05-23 @ 09:36) (85 - 102)  BP: 119/60 (10-05-23 @ 09:36) (117/51 - 146/66)  RR: 18 (10-05-23 @ 09:36) (12 - 20)  SpO2: 97% (10-05-23 @ 09:36)    PHYSICAL EXAM:  GENERAL: NAD  RESPIRATORY: Clear to auscultation bilaterally; No rales, rhonchi, wheezing, or rubs  CARDIOVASCULAR: Regular rate and rhythm; No murmurs, rubs, or gallops  GASTROINTESTINAL: Soft, Nontender, Nondistended; Bowel sounds present  EXTREMITIES:  2+ Peripheral Pulses, No clubbing, cyanosis, or edema  SKIN: Dressing C/D/I; +HCV      LABS:              11.5                 139  | 26   | 6            11.91 >-----------< 208     ------------------------< 133                   35.2                 3.7  | 102  | 0.48                                         Ca 9.1   Mg x     Ph x        Urinalysis Basic - ( 05 Oct 2023 05:53 )  Color: x / Appearance: x / SG: x / pH: x  Gluc: 133 mg/dL / Ketone: x  / Bili: x / Urobili: x   Blood: x / Protein: x / Nitrite: x   Leuk Esterase: x / RBC: x / WBC x   Sq Epi: x / Non Sq Epi: x / Bacteria: x    RADIOLOGY & ADDITIONAL TESTS:  Imaging Personally Reviewed: [x] Yes  < from: CT Angio Chest PE Protocol w/ IV Cont (10.04.23 @ 18:42) >  IMPRESSION:. No pulmonary embolism.  < end of copied text >      [ ] Consultant(s) Notes Reviewed:  [x] Care Discussed with Consultants/Other Providers: Orthopedic PA - discussed disposition

## 2023-10-05 NOTE — PROGRESS NOTE ADULT - SUBJECTIVE AND OBJECTIVE BOX
ORTHO PROGRESS NOTE     Patient resting comfortably without complaint  Walking with physical therapy. CTPA performed yesterday for tachycardia, results negative for PE      Vital Signs Last 24 Hrs  T(C): 36.8 (05 Oct 2023 02:00), Max: 36.9 (04 Oct 2023 15:15)  T(F): 98.3 (05 Oct 2023 02:00), Max: 98.4 (04 Oct 2023 15:15)  HR: 85 (05 Oct 2023 02:00) (85 - 133)  BP: 123/57 (05 Oct 2023 02:00) (115/50 - 146/66)  BP(mean): 83 (04 Oct 2023 13:00) (65 - 83)  RR: 16 (05 Oct 2023 02:00) (11 - 20)  SpO2: 98% (05 Oct 2023 02:00) (93% - 100%)    Parameters below as of 05 Oct 2023 02:00  Patient On (Oxygen Delivery Method): room air          Neck: Dressing clean/ dry/intact              HV drain: 55/110    BUE: Deltoids: 5/5         Biceps: 5/5         Triceps: 5/5          Wrist ext: 5/5         : 5/5      A/P :  Stable              PT-WBAT             Pain control             Incentive spirometer             Follow up AM labs/ HV output               ORTHO PROGRESS NOTE     Patient resting comfortably without complaint  Walking with physical therapy. CTPA performed yesterday for tachycardia, results negative for PE      Vital Signs Last 24 Hrs  T(C): 36.8 (05 Oct 2023 02:00), Max: 36.9 (04 Oct 2023 15:15)  T(F): 98.3 (05 Oct 2023 02:00), Max: 98.4 (04 Oct 2023 15:15)  HR: 85 (05 Oct 2023 02:00) (85 - 133)  BP: 123/57 (05 Oct 2023 02:00) (115/50 - 146/66)  BP(mean): 83 (04 Oct 2023 13:00) (65 - 83)  RR: 16 (05 Oct 2023 02:00) (11 - 20)  SpO2: 98% (05 Oct 2023 02:00) (93% - 100%)    Parameters below as of 05 Oct 2023 02:00  Patient On (Oxygen Delivery Method): room air          Neck: Dressing clean/ dry/intact              HV drain: 55/110    BUE: Deltoids: 5/5         Biceps: 5/5         Triceps: 5/5          Wrist ext: 5/5         : 5/5      A/P :  Stable              PT-WBAT             Pain control             Incentive spirometer             Follow up AM labs/ HV output                Patient seen and examined today on rounds doing well. Eating breakfast in chair. Arms and hands feel better from pre-operative, incisional pain controlled. I discussed with the above and with her daughter on the phone, Dr. Dieter Carbajal.

## 2023-10-05 NOTE — PROGRESS NOTE ADULT - ASSESSMENT
78F h/o left eye glaucoma, right eye blindness (pt wears prosthesis on her right eye), arthritis, hypercholesteremia, colon cancer, h/o appendicitis, OA and lumbar stenosis, colon ca s/p partial colectomy (2019), right eye blindness (from childhood) p/w spinal stenosis of the cervical region now s/p C3-6 Posterior cervical fusion on 10/3/23 course c/b post-op tachycardia - resolved.

## 2023-10-05 NOTE — PROGRESS NOTE ADULT - PROBLEM SELECTOR PLAN 1
-Pain well controlled; continue management and pain control per ortho recs with neurontin qhs, tylenol tid, flexeril prn, dilaudid prn, tramadol tid and oxycodone IR prn  -c/w bowel regimen  -c/w incentive spirometer use  -passed TOV  -f/u PT recs

## 2023-10-05 NOTE — PROGRESS NOTE ADULT - PROBLEM SELECTOR PLAN 4
-likely reactive secondary to post-op changes; patient with no signs of active infection and hemodynamically stable; will continue to monitor closely.  -trending down

## 2023-10-05 NOTE — PROGRESS NOTE ADULT - TIME BILLING

## 2023-10-05 NOTE — PROGRESS NOTE ADULT - PROBLEM SELECTOR PLAN 2
-pain adequately controlled, BP controlled  -CTPA negative for thrombus  -patient's tachycardia much improved and satting well on room air

## 2023-10-06 ENCOUNTER — TRANSCRIPTION ENCOUNTER (OUTPATIENT)
Age: 78
End: 2023-10-06

## 2023-10-06 VITALS
SYSTOLIC BLOOD PRESSURE: 134 MMHG | OXYGEN SATURATION: 98 % | RESPIRATION RATE: 17 BRPM | DIASTOLIC BLOOD PRESSURE: 86 MMHG | TEMPERATURE: 98 F | HEART RATE: 99 BPM

## 2023-10-06 LAB
ANION GAP SERPL CALC-SCNC: 11 MMOL/L — SIGNIFICANT CHANGE UP (ref 7–14)
BUN SERPL-MCNC: 6 MG/DL — LOW (ref 7–23)
CALCIUM SERPL-MCNC: 9.1 MG/DL — SIGNIFICANT CHANGE UP (ref 8.4–10.5)
CHLORIDE SERPL-SCNC: 99 MMOL/L — SIGNIFICANT CHANGE UP (ref 98–107)
CO2 SERPL-SCNC: 26 MMOL/L — SIGNIFICANT CHANGE UP (ref 22–31)
CREAT SERPL-MCNC: 0.47 MG/DL — LOW (ref 0.5–1.3)
EGFR: 97 ML/MIN/1.73M2 — SIGNIFICANT CHANGE UP
GLUCOSE SERPL-MCNC: 105 MG/DL — HIGH (ref 70–99)
POTASSIUM SERPL-MCNC: 3.8 MMOL/L — SIGNIFICANT CHANGE UP (ref 3.5–5.3)
POTASSIUM SERPL-SCNC: 3.8 MMOL/L — SIGNIFICANT CHANGE UP (ref 3.5–5.3)
SODIUM SERPL-SCNC: 136 MMOL/L — SIGNIFICANT CHANGE UP (ref 135–145)

## 2023-10-06 RX ORDER — NALOXONE HYDROCHLORIDE 4 MG/.1ML
4 SPRAY NASAL
Qty: 0.1 | Refills: 0
Start: 2023-10-06 | End: 2023-10-06

## 2023-10-06 RX ORDER — CYCLOBENZAPRINE HYDROCHLORIDE 10 MG/1
1 TABLET, FILM COATED ORAL
Qty: 9 | Refills: 0
Start: 2023-10-06 | End: 2023-10-08

## 2023-10-06 RX ORDER — MELOXICAM 15 MG/1
1 TABLET ORAL
Refills: 0 | DISCHARGE

## 2023-10-06 RX ORDER — PANTOPRAZOLE SODIUM 20 MG/1
1 TABLET, DELAYED RELEASE ORAL
Qty: 30 | Refills: 0
Start: 2023-10-06 | End: 2023-11-04

## 2023-10-06 RX ORDER — SENNA PLUS 8.6 MG/1
2 TABLET ORAL
Qty: 14 | Refills: 0
Start: 2023-10-06 | End: 2023-10-12

## 2023-10-06 RX ORDER — TRAMADOL HYDROCHLORIDE 50 MG/1
0.5 TABLET ORAL
Qty: 10.5 | Refills: 0
Start: 2023-10-06 | End: 2023-10-12

## 2023-10-06 RX ORDER — OXYCODONE HYDROCHLORIDE 5 MG/1
0.5 TABLET ORAL
Qty: 21 | Refills: 0
Start: 2023-10-06 | End: 2023-10-12

## 2023-10-06 RX ORDER — POLYETHYLENE GLYCOL 3350 17 G/17G
17 POWDER, FOR SOLUTION ORAL
Qty: 119 | Refills: 0
Start: 2023-10-06 | End: 2023-10-12

## 2023-10-06 RX ADMIN — TRAMADOL HYDROCHLORIDE 25 MILLIGRAM(S): 50 TABLET ORAL at 06:59

## 2023-10-06 RX ADMIN — TRAMADOL HYDROCHLORIDE 25 MILLIGRAM(S): 50 TABLET ORAL at 13:42

## 2023-10-06 RX ADMIN — Medication 975 MILLIGRAM(S): at 07:00

## 2023-10-06 RX ADMIN — Medication 975 MILLIGRAM(S): at 14:30

## 2023-10-06 RX ADMIN — TRAMADOL HYDROCHLORIDE 25 MILLIGRAM(S): 50 TABLET ORAL at 07:19

## 2023-10-06 RX ADMIN — POLYETHYLENE GLYCOL 3350 17 GRAM(S): 17 POWDER, FOR SOLUTION ORAL at 12:27

## 2023-10-06 RX ADMIN — Medication 975 MILLIGRAM(S): at 07:19

## 2023-10-06 RX ADMIN — TRAMADOL HYDROCHLORIDE 25 MILLIGRAM(S): 50 TABLET ORAL at 14:30

## 2023-10-06 RX ADMIN — PANTOPRAZOLE SODIUM 40 MILLIGRAM(S): 20 TABLET, DELAYED RELEASE ORAL at 06:59

## 2023-10-06 RX ADMIN — Medication 975 MILLIGRAM(S): at 13:42

## 2023-10-06 NOTE — DISCHARGE NOTE NURSING/CASE MANAGEMENT/SOCIAL WORK - NSDCPEFALRISK_GEN_ALL_CORE
For information on Fall & Injury Prevention, visit: [email or web address] OR  [email or web address] OR  https://www.cdc.gov/steadi/patient.html

## 2023-10-06 NOTE — DISCHARGE NOTE PROVIDER - HOSPITAL COURSE
History of Present Illness	  78 year old female with pmhx of HLD, Lumbar stenosis, Colon ca s/p partial colectomy (2019), Right eye blindness (from childhood), presents for pre-op evaluation for diagnosis of Spinal stenosis cervical region. Patient is scheduled for C3-6 Posterior cervical fusion. Patient has tried other modalities such as NSAID, PT, and steroid injections without relief.     Patient underwent C3-C7 lami/PSF with Dr. Carbajal. Patient tolerated the procedure well without any intraoperative complications. Patient tolerated physical therapy, weight bearing as tolerated and pain was controlled. Seen by medical attending for continuity of care. A CT Chest was ordered and performed due to tachycardia post-operatively to rule out a pulmonary embolism which was deemed negative. Patient cleared for safe discharge. As per surgeon, the patient is stable and ready for discharge. Do not take any NSAIDS (motrin, advil, ibuprofen, aleve), Aspirin, Anti-inflammatory medications unless instructed by your orthopedic surgeon to continue. Avoid any heavy lifting, bending, squatting, or twisting motions. Keep dressing/incision clean, dry and intact, may remove dressing two days after discharge and leave incision open to air. Any sutures/staples to be removed on post-op day #14 at your office visit. Please follow up with Dr. Carbajal in 2 weeks, call the office to make an appointment, [phone]. Please follow up with your PMD for continuity of care and management as medications may have changed.     You have tested POSITIVE for the novel coronavirus (COVID-19). Upon discharge, please wear a face mask if you are around other individuals. Try to avoid contact with house members, family, and friends. Please follow up with your primary care physician within 2-3 weeks of your discharge from the hospital. Please take all medications as prescribed. If you experience any worsening or recurrence of your symptoms, particularly worsening or high fever, shortness of breathe, extreme fatigue, or bloody cough please call 9-1-1 immediately or report to the nearest Emergency Department.

## 2023-10-06 NOTE — DISCHARGE NOTE PROVIDER - NSDCCPTREATMENT_GEN_ALL_CORE_FT
PRINCIPAL PROCEDURE  Procedure: Fusion, spine, cervical, 4 levels, posterior approach  Findings and Treatment: Patient underwent C3-C7 laminectomy/posterior spinal fusion with Dr. Carbajal. Patient tolerated the procedure well without any intraoperative complications. Patient tolerated physical therapy, weight bearing as tolerated and pain was controlled. Seen by medical attending for continuity of care. A CT Chest was ordered and performed due to tachycardia post-operatively to rule out a pulmonary embolism which was deemed negative. Patient cleared for safe discharge. As per surgeon, the patient is stable and ready for discharge. Do not take any NSAIDS (motrin, advil, ibuprofen, aleve), Aspirin, Anti-inflammatory medications unless instructed by your orthopedic surgeon to continue. Avoid any heavy lifting, bending, squatting, or twisting motions. Keep dressing/incision clean, dry and intact, may remove dressing two days after discharge and leave incision open to air. Any sutures/staples to be removed on post-op day #14 at your office visit. Please follow up with Dr. Carbajal in 2 weeks, call the office to make an appointment, [phone]. Please follow up with your PMD for continuity of care and management as medications may have changed.   You have tested POSITIVE for the novel coronavirus (COVID-19). Upon discharge, please wear a face mask if you are around other individuals. Try to avoid contact with house members, family, and friends. Please follow up with your primary care physician within 2-3 weeks of your discharge from the hospital. Please take all medications as prescribed. If you experience any worsening or recurrence of your symptoms, particularly worsening or high fever, shortness of breathe, extreme fatigue, or bloody cough please call 9-1-1 immediately or report to the nearest Emergency Department.

## 2023-10-06 NOTE — DISCHARGE NOTE NURSING/CASE MANAGEMENT/SOCIAL WORK - PATIENT PORTAL LINK FT
You can access the FollowMyHealth Patient Portal offered by [facility] by registering at the following website: [email or web address]. By joining [employer]’s FollowMyHealth portal, you will also be able to view your health information using other applications (apps) compatible with our system.

## 2023-10-06 NOTE — DISCHARGE NOTE PROVIDER - CARE PROVIDER_API CALL
Dieter Carbajal  Orthopaedic Surgery  [address], [address]  [place] [address]  Phone: [phone]  Fax: [phone]  Follow Up Time: 2 weeks

## 2023-10-06 NOTE — DISCHARGE NOTE PROVIDER - NSDCFUSCHEDAPPT_GEN_ALL_CORE_FT
Dieter Carbajal  [facility]  ORTHOSURG [address]  Scheduled Appointment: 10/16/2023

## 2023-10-06 NOTE — DISCHARGE NOTE NURSING/CASE MANAGEMENT/SOCIAL WORK - NSDCPNINST_GEN_ALL_CORE
You have a postop appointment with Dr Carbajal on October 16th 2023 @ 10:00 AM.  Notify Dr Carbajal if you experience any increase in pain not relieved with medication, any redness, drainage or swelling around incision or any fever >10.5.  Drink plenty of fluids.  No heavy lifting or straining.  Keep your collar on, refrain from twisting motions.  Use over the counter stool softeners to assist with constipation.  Continue to quarantine as per guidelines.

## 2023-10-06 NOTE — DISCHARGE NOTE PROVIDER - NSDCMRMEDTOKEN_GEN_ALL_CORE_FT
atorvastatin 10 mg oral tablet: 1 orally once a day (at bedtime)  Calcitrate 950 mg (200 mg elemental calcium) oral tablet: 1 tab(s) orally 2 times a day  Centrum multivitamin: 1 tab PO daily -LD 9/25/23  gabapentin 300 mg oral capsule: 1 orally once a day (at bedtime) as needed for pain  meloxicam 15 mg oral tablet: 1 orally once a day as needed for pain LD- 9/25/23  PreserVision AREDS 2 oral capsule: 1 orally once a day LD 9/25/23   atorvastatin 10 mg oral tablet: 1 orally once a day (at bedtime)  Calcitrate 950 mg (200 mg elemental calcium) oral tablet: 1 tab(s) orally 2 times a day  Centrum multivitamin: 1 tab PO daily -LD 9/25/23  cyclobenzaprine 5 mg oral tablet: 1 tab(s) orally every 8 hours as needed for Muscle Spasm MDD: 3 tabs  gabapentin 300 mg oral capsule: 1 orally once a day (at bedtime) as needed for pain  naloxone 4 mg/0.1 mL nasal spray: 4 milligram(s) intranasally once as needed for opioid overdose MDD: 1  oxyCODONE 5 mg oral tablet: 0.5 tab(s) orally every 4 hours as needed for Moderate-Severe Pain MDD: 3 tabs  pantoprazole 40 mg oral delayed release tablet: 1 tab(s) orally once a day (before a meal) MDD: 1 tab  polyethylene glycol 3350 oral powder for reconstitution: 17 gram(s) orally once a day MDD: 17 grams  PreserVision AREDS 2 oral capsule: 1 orally once a day  9/25/23  senna leaf extract oral tablet: 2 tab(s) orally once a day (at bedtime) MDD: 2 tabs  traMADol 50 mg oral tablet: 0.5 tab(s) orally every 8 hours MDD: 3 tabs

## 2023-10-06 NOTE — PROGRESS NOTE ADULT - SUBJECTIVE AND OBJECTIVE BOX
ORTHO PROGRESS NOTE     Patient resting comfortably without complaint. Walking with physical therapy. Found to be COVID+ yesterday, denies SOB, fever, chills, cough and generally feels well.     Vital Signs Last 24 Hrs  T(C): 36.9 (06 Oct 2023 06:50), Max: 37.4 (06 Oct 2023 01:38)  T(F): 98.4 (06 Oct 2023 06:50), Max: 99.4 (06 Oct 2023 01:38)  HR: 104 (06 Oct 2023 06:50) (82 - 108)  BP: 118/84 (06 Oct 2023 06:50) (113/63 - 139/70)  BP(mean): --  RR: 18 (06 Oct 2023 06:50) (16 - 18)  SpO2: 98% (06 Oct 2023 06:50) (96% - 98%)    Parameters below as of 06 Oct 2023 06:50  Patient On (Oxygen Delivery Method): room air      Neck: Dressing clean/ dry/intact              HV drain: NR/10    BUE: Deltoids: 5/5         Biceps: 5/5         Triceps: 5/5          Wrist ext: 5/5         : 5/5         SILT C5-T1, WWP, no Solomon      A/P :   78F s/p C3-7 lami/PSF 10/3  - PT/OT/OOB  - Aspen when OOB  - Pain control  - Monitor HV  - Will clarify homegoing isolation recs  - Dispo: home pending drain removal, possible today   ORTHO PROGRESS NOTE     Patient resting comfortably without complaint. Walking with physical therapy. Found to be COVID+ yesterday, denies SOB, fever, chills, cough and generally feels well.     Vital Signs Last 24 Hrs  T(C): 36.9 (06 Oct 2023 06:50), Max: 37.4 (06 Oct 2023 01:38)  T(F): 98.4 (06 Oct 2023 06:50), Max: 99.4 (06 Oct 2023 01:38)  HR: 104 (06 Oct 2023 06:50) (82 - 108)  BP: 118/84 (06 Oct 2023 06:50) (113/63 - 139/70)  BP(mean): --  RR: 18 (06 Oct 2023 06:50) (16 - 18)  SpO2: 98% (06 Oct 2023 06:50) (96% - 98%)    Parameters below as of 06 Oct 2023 06:50  Patient On (Oxygen Delivery Method): room air      Neck: Dressing clean/ dry/intact              HV drain: NR/10    BUE: Deltoids: 5/5         Biceps: 5/5         Triceps: 5/5          Wrist ext: 5/5         : 5/5         SILT C5-T1, WWP, no Neha      A/P :   78F s/p C3-7 lami/PSF 10/3  - PT/OT/OOB  - Aspen when OOB  - Pain control  - Monitor HV  - Will clarify homegoing isolation recs  - Dispo: home pending drain removal, possible today    Patient doing well, I discussed with the above, Dr. Dieter Carbajal.

## 2023-10-06 NOTE — DISCHARGE NOTE PROVIDER - NSDCCPCAREPLAN_GEN_ALL_CORE_FT
PRINCIPAL DISCHARGE DIAGNOSIS  Diagnosis: Spinal stenosis of cervical region  Assessment and Plan of Treatment:

## 2023-10-12 ENCOUNTER — APPOINTMENT (OUTPATIENT)
Dept: ORTHOPEDIC SURGERY | Facility: CLINIC | Age: 78
End: 2023-10-12
Payer: MEDICARE

## 2023-10-12 VITALS
WEIGHT: 125 LBS | DIASTOLIC BLOOD PRESSURE: 86 MMHG | BODY MASS INDEX: 25.2 KG/M2 | HEART RATE: 106 BPM | SYSTOLIC BLOOD PRESSURE: 129 MMHG | HEIGHT: 59 IN | OXYGEN SATURATION: 99 %

## 2023-10-12 PROCEDURE — 72040 X-RAY EXAM NECK SPINE 2-3 VW: CPT

## 2023-10-12 PROCEDURE — 99024 POSTOP FOLLOW-UP VISIT: CPT

## 2023-10-16 ENCOUNTER — APPOINTMENT (OUTPATIENT)
Dept: ORTHOPEDIC SURGERY | Facility: CLINIC | Age: 78
End: 2023-10-16
Payer: MEDICARE

## 2023-10-16 VITALS — BODY MASS INDEX: 25.2 KG/M2 | WEIGHT: 125 LBS | HEIGHT: 59 IN

## 2023-10-16 PROCEDURE — 99024 POSTOP FOLLOW-UP VISIT: CPT

## 2023-10-16 RX ORDER — TRAMADOL HYDROCHLORIDE 50 MG/1
50 TABLET, COATED ORAL
Qty: 20 | Refills: 0 | Status: ACTIVE | COMMUNITY
Start: 2023-10-16 | End: 1900-01-01

## 2023-11-01 RX ORDER — CYCLOBENZAPRINE HYDROCHLORIDE 5 MG/1
5 TABLET, FILM COATED ORAL 3 TIMES DAILY
Qty: 30 | Refills: 0 | Status: ACTIVE | COMMUNITY
Start: 2023-10-11 | End: 1900-01-01

## 2023-11-13 ENCOUNTER — APPOINTMENT (OUTPATIENT)
Dept: ORTHOPEDIC SURGERY | Facility: CLINIC | Age: 78
End: 2023-11-13
Payer: MEDICARE

## 2023-11-13 VITALS
HEART RATE: 99 BPM | SYSTOLIC BLOOD PRESSURE: 124 MMHG | OXYGEN SATURATION: 98 % | BODY MASS INDEX: 24.39 KG/M2 | WEIGHT: 121 LBS | HEIGHT: 59 IN | DIASTOLIC BLOOD PRESSURE: 74 MMHG

## 2023-11-13 PROCEDURE — 72040 X-RAY EXAM NECK SPINE 2-3 VW: CPT

## 2023-11-13 PROCEDURE — 99024 POSTOP FOLLOW-UP VISIT: CPT

## 2023-12-10 ENCOUNTER — NON-APPOINTMENT (OUTPATIENT)
Age: 78
End: 2023-12-10

## 2024-01-02 NOTE — BRIEF OPERATIVE NOTE - COMMENTS
HPI   Chief Complaint   Patient presents with    Sore Throat       Patient presents emergency department with complaint of sore throat from home.  Patient states that she feels soreness and swelling in the left side of her throat.  Does have noticeable voice change.  Denies any fevers chills.  No chest pain or still heart palpitations.  No other complaints      History provided by:  Patient   used: No                        Farooq Coma Scale Score: 15                  Patient History   No past medical history on file.  No past surgical history on file.  No family history on file.  Social History     Tobacco Use    Smoking status: Not on file    Smokeless tobacco: Not on file   Substance Use Topics    Alcohol use: Not on file    Drug use: Not on file       Physical Exam   ED Triage Vitals [01/02/24 1411]   Temp Heart Rate Resp BP   36.7 °C (98.1 °F) 87 18 (!) 141/93      SpO2 Temp src Heart Rate Source Patient Position   99 % -- -- --      BP Location FiO2 (%)     -- --       Physical Exam  Vitals and nursing note reviewed.   Constitutional:       Appearance: She is well-developed and normal weight.   HENT:      Head:      Comments: Uvula deviation to the right with some swelling in left posterior pharynx.  No drooling.  Cardiovascular:      Rate and Rhythm: Normal rate and regular rhythm.      Heart sounds: Normal heart sounds.   Pulmonary:      Effort: Pulmonary effort is normal.      Breath sounds: Normal breath sounds.   Musculoskeletal:      Cervical back: Normal range of motion.   Skin:     Capillary Refill: Capillary refill takes less than 2 seconds.   Neurological:      Mental Status: She is alert and oriented to person, place, and time.   Psychiatric:         Mood and Affect: Mood normal.         Behavior: Behavior normal.         ED Course & MDM   Diagnoses as of 01/02/24 1834   Tonsillitis   Difficulty swallowing, mixes medication with food       Medical Decision Making  Patient  presents emergency department with complaint of having difficulty with swallowing and sore throat painful on left side compared to right.  Given presentation and uvula deviation CT soft tissue with IV contrast was ordered.  Patient at this point in time is noted to have no evidence of peritonsillar abscess on CT, swabs are negative for strep, coronavirus and influenza.  Suspect the patient may have viral disease.  Given constellation of symptoms less likely patient is having angioedema.  She does not take any ACE inhibitor's and has no personal or family history of idiopathic angioedema.  Patient will be prescribed Augmentin for home and recommended to follow-up with ENT specialist.        Procedure  Procedures     Purvi Tabares PA-C  01/02/24 2956       Purvi Tabares PA-C  01/02/24 6969     bilateral TAP blocks pre procedure

## 2024-01-08 ENCOUNTER — NON-APPOINTMENT (OUTPATIENT)
Age: 79
End: 2024-01-08

## 2024-01-12 NOTE — H&P PST ADULT - TEMPERATURE IN CELSIUS (DEGREES C)
[FreeTextEntry1] : 12/19/2023 Ms. Guerra presents today for her OTV.  She completed 2/16 fractions for a total of 530 cGy to the right breast.  The patient reports no pain,redness or itching at the treatment site.  She is using Calendula cream twice  a day.  She has a healthy diet, bowels are regular.  12/27/23 FX 7 today.  She is feeling well and has no c/o pain or discomfort.  her skin is slightly itchy and she is using Calendula lotion twice per day.  Her appetite remains good.    1/2/2024 Ms. Guerra presents today for her OTV.  She completed 10/16 fractions for a total of 2650 cGy to the right breast.  The patient denies any pain, itching or redness to the breast.  She is using Calendula cream twice a day.  Her appetite is healthy.  1/9/2024 Ms. Guerra presents today for her OTV.  She completed 15/16 fractions for a total of 3975 cGy to the right breast.  The patient reports slight redness and irritation to the right breast.  She is using Calendula cream and will start adding Aquaphor twice a day.  Her appetite is healthy.  
36.8

## 2024-01-17 ENCOUNTER — APPOINTMENT (OUTPATIENT)
Dept: ORTHOPEDIC SURGERY | Facility: CLINIC | Age: 79
End: 2024-01-17
Payer: MEDICARE

## 2024-01-17 VITALS
HEIGHT: 59 IN | HEART RATE: 93 BPM | SYSTOLIC BLOOD PRESSURE: 121 MMHG | BODY MASS INDEX: 24.8 KG/M2 | WEIGHT: 123 LBS | DIASTOLIC BLOOD PRESSURE: 74 MMHG | OXYGEN SATURATION: 99 %

## 2024-01-17 DIAGNOSIS — M48.02 SPINAL STENOSIS, CERVICAL REGION: ICD-10-CM

## 2024-01-17 PROCEDURE — 72040 X-RAY EXAM NECK SPINE 2-3 VW: CPT

## 2024-01-17 PROCEDURE — 99214 OFFICE O/P EST MOD 30 MIN: CPT

## 2024-01-17 NOTE — PHYSICAL EXAM
[Normal] : Gait: normal [Solomon's Sign] : negative Solomon's sign [Pronator Drift] : negative pronator drift [SLR] : negative straight leg raise [de-identified] : 5 out of 5 motor strength, sensation is intact and symmetrical full range of motion flexion extension and rotation, no palpatory tenderness full range of motion of hips knees shoulders and elbows (all four extremities), no atrophy, negative straight leg raise, no pathological reflexes, no swelling, normal ambulation, no apparent distress skin is intact, no palpable lymph nodes, no upper or lower extremity instability, alert and oriented x3 and normal mood. Normal finger-to nose test.   [de-identified] : XR AP Lat Cervical 01/17/2024 - s/p C3-7 PCF -reviewed with patient.    MR SPINE LUMBAR - ORDERED BY: SKYE WASHINGTON   PROCEDURE DATE: 02/06/2023    INTERPRETATION: CLINICAL INFORMATION: Bilateral lower extremity pain and radiculopathy.  ADDITIONAL CLINICAL INFORMATION: Low back muscle strain S39.012A  TECHNIQUE: Multiplanar, multisequence MRI was performed of the lumbar spine.  PRIOR STUDIES: No priors available for comparison.  FINDINGS:  LOCALIZER: No additional findings. BONES: There is no fracture or bone marrow edema. Small hemangioma at the superior T11 vertebra. ALIGNMENT: The alignment is normal. SACROILIAC JOINTS/SACRUM: There is no sacral fracture. The SI joints are partially visualized but are intact. CONUS AND CAUDA EQUINA: The distal cord and conus are normal in signal. Conus terminates at L2. VISUALIZED INTRAPELVIC/INTRA-ABDOMINAL SOFT TISSUES: Normal. PARASPINAL SOFT TISSUES: Normal.   INDIVIDUAL LEVELS:  LOWER THORACIC SPINE: No spinal canal or neuroforaminal stenosis.  L1-L2: Moderate left and mild right facet degenerative change. No central canal or foraminal narrowing. L2-L3: Mild to moderate facet degenerative change. No central canal narrowing. No foraminal narrowing. L3-L4: Moderate facet degenerative change. Mild disc bulging. Moderate central canal narrowing. Mild foraminal narrowing. A left foraminal disc protrusion contacts the exiting left L3 nerve root. L4-L5: Severe left and moderate right facet degenerative change. Mild anterolisthesis uncovering the disc space. Osseous ridging and disc bulging is asymmetric to left. Severe central canal stenosis. Moderate to severe left foraminal narrowing with contact of exiting left L4 nerve root. Mild right foraminal narrowing. L5-S1: Moderate facet degenerative change. Posterior osseous ridging and disc bulging. Moderate to severe bilateral foraminal narrowing. No central canal narrowing.   IMPRESSION:  Multilevel spondylosis most severe at L3-4 and L4-5 as above.  --- End of Report ---.  XR AP Lat Lumbar 08/24/2023 -L4-5 spondylolisthesis- reviewed with the patient.   XR AP Lat Cervical 11/13/2023 -C3-7 PCF, adequate- reviewed with the patient.   XR AP Lat Cervical 10/12/23 -C3-7 PCF, adequate- reviewed with patient.

## 2024-01-17 NOTE — HISTORY OF PRESENT ILLNESS
[Improving] : improving [de-identified] : 78 year female presents s/p C3-7 PCF on 10/3/23. Pre-operative RUE pain has improved. Left arm better as well. She returns today for follow up of moderate L3-4 stenosis and severe L4-5 stenosis with slight L4/5 Spondylolisthesis.. Had participated with PT lumbar in 2023, about 20 visits. Had not felt relief. S/P LESI x 3 in 2023 with Dr. Fisher but no relief.  No fever chills sweats nausea vomiting no bowel or bladder dysfunction, no recent weight loss or gain no night pain. This history is in addition to the intake form that I personally reviewed.

## 2024-01-17 NOTE — DISCUSSION/SUMMARY
[Medication Risks Reviewed] : Medication risks reviewed [Surgical risks reviewed] : Surgical risks reviewed [de-identified] : s/p C3-7 PCF on 10/3/23. Getting better. L4-5 severe Lumbar stenosis. Discussed all options. F/u in 3 months. Discussed L3-5 laminectomy. Risks of surgery include infection, dural tear, nerve root injury, reherniation, future leg pain, future back pain, retained fragment, hematoma, urinary retention, worsening leg symptoms, foot drop, anesthetic risks, blood transfusion risks, positioning pain, visceral vascular injury, deep vein thrombosis, pulmonary embolus, and death. All risks were explained not exclusive to the ones mentioned alternatives were discussed and all questions were answered the patient agrees and understands the above and is in complete agreement with the plan All options discussed including rest, medicine, home exercise, acupuncture, Chiropractic care, Physical Therapy, Pain management, and last resort surgery. All questions were answered, all alternatives discussed and the patient is in complete agreement with the treatment plan which the patient contributed to and discussed with me through the shared decision making process. Follow-up appointment as instructed. Any issues and the patient will call or come in sooner. Her daughter agrees with the plan.  Daughter agrees with plan.

## 2024-01-17 NOTE — ADDENDUM
[FreeTextEntry1] : This note was written by Betty Harvey on 01/17/23 acting as scribe for Dr. Dieter Carbajal M.D. I, Dieter Carbajal MD, have read and attest that all the information, medical decision making, and discharge instructions within are true and accurate.

## 2024-02-05 RX ORDER — DICLOFENAC SODIUM 75 MG/1
75 TABLET, DELAYED RELEASE ORAL
Qty: 60 | Refills: 1 | Status: ACTIVE | COMMUNITY
Start: 2024-02-05 | End: 1900-01-01

## 2024-02-21 NOTE — ASU PATIENT PROFILE, ADULT - MEDICATION ADMINISTRATION INFO, PROFILE
no concerns PRINCIPAL DISCHARGE DIAGNOSIS  Diagnosis: Bunion, left foot  Assessment and Plan of Treatment: Status post Left 1st MTP joint fusion with bone graft. Continue tylenol as needed for pain. Non-weight bearing to L foot. Please keep dressing clean/dry/intact. Please follow up with Podiatry within 5 days of discharge.

## 2024-03-21 ENCOUNTER — NON-APPOINTMENT (OUTPATIENT)
Age: 79
End: 2024-03-21

## 2024-03-21 ENCOUNTER — APPOINTMENT (OUTPATIENT)
Dept: OPHTHALMOLOGY | Facility: CLINIC | Age: 79
End: 2024-03-21
Payer: MEDICARE

## 2024-03-21 PROCEDURE — 92012 INTRM OPH EXAM EST PATIENT: CPT

## 2024-03-21 PROCEDURE — 92083 EXTENDED VISUAL FIELD XM: CPT

## 2024-03-21 PROCEDURE — 92133 CPTRZD OPH DX IMG PST SGM ON: CPT

## 2024-04-17 ENCOUNTER — APPOINTMENT (OUTPATIENT)
Dept: ORTHOPEDIC SURGERY | Facility: CLINIC | Age: 79
End: 2024-04-17
Payer: MEDICARE

## 2024-04-17 VITALS — BODY MASS INDEX: 25.4 KG/M2 | HEIGHT: 59 IN | WEIGHT: 126 LBS

## 2024-04-17 PROCEDURE — 99214 OFFICE O/P EST MOD 30 MIN: CPT

## 2024-04-17 NOTE — DISCUSSION/SUMMARY
[Medication Risks Reviewed] : Medication risks reviewed [Surgical risks reviewed] : Surgical risks reviewed [de-identified] : s/p C3-7 PCF on 10/3/23, getting better. L4-5 severe Lumbar stenosis. L4-5 mild spondylolisthesis. Discussed all options. Lumbar MRI referral to assess for surgery. F/U after MRI. Discussed L3-5 laminectomy. Risks of surgery include infection, dural tear, nerve root injury, reherniation, future leg pain, future back pain, retained fragment, hematoma, urinary retention, worsening leg symptoms, foot drop, anesthetic risks, blood transfusion risks, positioning pain, visceral vascular injury, deep vein thrombosis, pulmonary embolus, and death. All risks were explained not exclusive to the ones mentioned alternatives were discussed and all questions were answered the patient agrees and understands the above and is in complete agreement with the plan All options discussed including rest, medicine, home exercise, acupuncture, Chiropractic care, Physical Therapy, Pain management, and last resort surgery. All questions were answered, all alternatives discussed and the patient is in complete agreement with the treatment plan which the patient contributed to and discussed with me through the shared decision making process. Follow-up appointment as instructed. Any issues and the patient will call or come in sooner. Her daughter agrees with the plan.  Daughter agrees with plan.

## 2024-04-17 NOTE — ADDENDUM
[FreeTextEntry1] : This note was written by Cristopher Campbell on 04/17/2024 acting as scribe for Dr. Dieter Carbajal M.D.  I, Dieter Carbajal MD, have read and attest that all the information, medical decision making and discharge instructions within are true and accurate.

## 2024-04-17 NOTE — HISTORY OF PRESENT ILLNESS
[Improving] : improving [de-identified] : 78 year female presents for  moderate L3-4 stenosis and severe L4-5 stenosis with slight L4/5 Spondylolisthesis. She returns today for follow up of moderate L3-4 stenosis and severe L4-5 stenosis with slight L4/5 Spondylolisthesis. Had participated with PT lumbar in 2023, about 20 visits. S/P LESI x 3 in 2023 with Dr. Fisher but no relief.  She still has lumbar pain today. Last lumbar MRI was done on 2/23. Would like to discuss surgery. Here with daughter. No fever chills sweats nausea vomiting no bowel or bladder dysfunction, no recent weight loss or gain no night pain. This history is in addition to the intake form that I personally reviewed.

## 2024-04-17 NOTE — PHYSICAL EXAM
[Normal] : Gait: normal [Solomon's Sign] : negative Solomon's sign [Pronator Drift] : negative pronator drift [SLR] : negative straight leg raise [de-identified] : 5 out of 5 motor strength, sensation is intact and symmetrical full range of motion flexion extension and rotation, no palpatory tenderness full range of motion of hips knees shoulders and elbows (all four extremities), no atrophy, negative straight leg raise, no pathological reflexes, no swelling, normal ambulation, no apparent distress skin is intact, no palpable lymph nodes, no upper or lower extremity instability, alert and oriented x3 and normal mood. Normal finger-to nose test.   [de-identified] : XR AP Lat Cervical 01/17/2024 - s/p C3-7 PCF -reviewed with patient.   I reviewed, interpreted and clinically correlated the following outside imaging studies, MR SPINE LUMBAR - ORDERED BY: SKYE WASHINGTON   PROCEDURE DATE: 02/06/2023    INTERPRETATION: CLINICAL INFORMATION: Bilateral lower extremity pain and radiculopathy.  ADDITIONAL CLINICAL INFORMATION: Low back muscle strain S39.012A  TECHNIQUE: Multiplanar, multisequence MRI was performed of the lumbar spine.  PRIOR STUDIES: No priors available for comparison.  FINDINGS:  LOCALIZER: No additional findings. BONES: There is no fracture or bone marrow edema. Small hemangioma at the superior T11 vertebra. ALIGNMENT: The alignment is normal. SACROILIAC JOINTS/SACRUM: There is no sacral fracture. The SI joints are partially visualized but are intact. CONUS AND CAUDA EQUINA: The distal cord and conus are normal in signal. Conus terminates at L2. VISUALIZED INTRAPELVIC/INTRA-ABDOMINAL SOFT TISSUES: Normal. PARASPINAL SOFT TISSUES: Normal.   INDIVIDUAL LEVELS:  LOWER THORACIC SPINE: No spinal canal or neuroforaminal stenosis.  L1-L2: Moderate left and mild right facet degenerative change. No central canal or foraminal narrowing. L2-L3: Mild to moderate facet degenerative change. No central canal narrowing. No foraminal narrowing. L3-L4: Moderate facet degenerative change. Mild disc bulging. Moderate central canal narrowing. Mild foraminal narrowing. A left foraminal disc protrusion contacts the exiting left L3 nerve root. L4-L5: Severe left and moderate right facet degenerative change. Mild anterolisthesis uncovering the disc space. Osseous ridging and disc bulging is asymmetric to left. Severe central canal stenosis. Moderate to severe left foraminal narrowing with contact of exiting left L4 nerve root. Mild right foraminal narrowing. L5-S1: Moderate facet degenerative change. Posterior osseous ridging and disc bulging. Moderate to severe bilateral foraminal narrowing. No central canal narrowing.   IMPRESSION:  Multilevel spondylosis most severe at L3-4 and L4-5 as above.  --- End of Report ---.  XR AP Lat Lumbar 08/24/2023 -L4-5 spondylolisthesis- reviewed with the patient.   XR AP Lat Cervical 11/13/2023 -C3-7 PCF, adequate- reviewed with the patient.   XR AP Lat Cervical 10/12/23 -C3-7 PCF, adequate- reviewed with patient.

## 2024-04-19 ENCOUNTER — NON-APPOINTMENT (OUTPATIENT)
Age: 79
End: 2024-04-19

## 2024-04-25 NOTE — H&P PST ADULT - GENERAL
2nd attempt using  patient's phone has no voicemail so writer asked  to call emergency contact which is wife  was able to LVM hoping for a call back.   details…

## 2024-05-20 ENCOUNTER — APPOINTMENT (OUTPATIENT)
Dept: ORTHOPEDIC SURGERY | Facility: CLINIC | Age: 79
End: 2024-05-20
Payer: MEDICARE

## 2024-05-20 VITALS
HEART RATE: 85 BPM | SYSTOLIC BLOOD PRESSURE: 112 MMHG | OXYGEN SATURATION: 98 % | BODY MASS INDEX: 25.2 KG/M2 | HEIGHT: 59 IN | DIASTOLIC BLOOD PRESSURE: 70 MMHG | WEIGHT: 125 LBS

## 2024-05-20 DIAGNOSIS — M48.07 SPINAL STENOSIS, LUMBOSACRAL REGION: ICD-10-CM

## 2024-05-20 DIAGNOSIS — M43.16 SPONDYLOLISTHESIS, LUMBAR REGION: ICD-10-CM

## 2024-05-20 PROCEDURE — 72100 X-RAY EXAM L-S SPINE 2/3 VWS: CPT

## 2024-05-20 PROCEDURE — 99215 OFFICE O/P EST HI 40 MIN: CPT

## 2024-05-20 NOTE — DISCUSSION/SUMMARY
[Medication Risks Reviewed] : Medication risks reviewed [Surgical risks reviewed] : Surgical risks reviewed [de-identified] : s/p C3-7 PCF on 10/3/23, getting better. L4-5 severe Lumbar stenosis. L4-5 mild spondylolisthesis. Discussed all options. She has failed physical therapy, medications and epidural injections.  She does not use tobacco products. Discussed L3-5 laminectomy and L4-5 fusion. Risks of surgery include infection, dural tear, nerve root injury, reherniation, future leg pain, future back pain, retained fragment, hematoma, urinary retention, worsening leg symptoms, foot drop, anesthetic risks, blood transfusion risks, positioning pain, visceral vascular injury, deep vein thrombosis, pulmonary embolus, and death. Somatosensory evoked potentials and EMG monitoring will be used. Surgery will involve lumbar decompression and fusion with or without instrumentation, local auto bone fusion, demineralized bone matrix, and neural monitoring. Patient will need spinal orthosis pre and post operatively. All risks were explained not exclusive to the ones mentioned alternatives were discussed and all questions were answered the patient agrees and understands the above and is in complete agreement with the plan. She would like to undergo surgery sometime in September when her daughter is around to help her. All options discussed including rest, medicine, home exercise, acupuncture, Chiropractic care, Physical Therapy, Pain management, and last resort surgery. All questions were answered, all alternatives discussed, and the patient is in complete agreement with the treatment plan which the patient contributed to and discussed with me through the shared decision-making process. Follow-up appointment as instructed. Any issues and the patient will call or come in sooner. Daughter agrees with plan.

## 2024-05-20 NOTE — HISTORY OF PRESENT ILLNESS
[de-identified] : 79 year old female presents for follow up of moderate L3-4 stenosis and severe L4-5 stenosis with slight L4/5 Spondylolisthesis.  Had participated with PT lumbar in 2023, about 20 visits. S/P LESI x 3 in 2023 with Dr. Fisher but no relief.  She still has lumbar pain with radiation to the bilateral posterior thighs.  Took diclofenac and had mild relief.  Presents today for MRI Lumbar review.  She also complains of neck pain with radiation down the RLE, s/p C3-7 PCF on 10/3/23. Here with daughter. No fever chills sweats nausea vomiting no bowel or bladder dysfunction, no recent weight loss or gain no night pain. This history is in addition to the intake form that I personally reviewed.  [Worsening] : worsening

## 2024-05-20 NOTE — PHYSICAL EXAM
[Normal] : Gait: normal [Solomon's Sign] : negative Solomon's sign [Pronator Drift] : negative pronator drift [SLR] : negative straight leg raise [de-identified] : Strength is 4 out of 5 bilateral tibialis anterior and extensor houses longus and she walks pitched forward otherwise, 5 out of 5 motor strength, sensation is intact and symmetrical full range of motion flexion extension and rotation, no palpatory tenderness full range of motion of hips knees shoulders and elbows (all four extremities), no atrophy, negative straight leg raise, no pathological reflexes, no swelling, normal ambulation, no apparent distress skin is intact, no palpable lymph nodes, no upper or lower extremity instability, alert and oriented x3 and normal mood. Normal finger-to nose test.   [de-identified] : AP lateral x-rays today does show a spondylolisthesis at [id number] with the patient and daughter.  I reviewed, interpreted and clinically correlated the following outside imaging studies, MRI LUMBAR SPINE WITHOUT CONTRAST  4/22/24   (R)   HISTORY: Lumbar spondylolisthesis. Lumbar sacral stenosis.  TECHNIQUE: Multiplanar, multi-sequential MRI of the lumbar spine was obtained on a 1.5T scanner using a standard protocol.  COMPARISON:  No prior  FINDINGS:  For purposes of this dictation, the last well-formed disc space will be labeled L5-S1.  OSSEOUS STRUCTURES: Vertebral body heights are preserved. No marrow edema or destructive marrow infiltrative process. There is marrow edema in the pedicles of L5 which may represent a stress injury or degenerative change.  ALIGNMENT: Normal lumbar lordosis is preserved.  No significant scoliosis. Is a grade 1 anterolisthesis of L4 and L5.. No spondylolysis within the limitations of MRI.  SPINAL CORD AND CONUS MEDULLARIS: Normal signal.  PARASPINAL AND INTRA-ABDOMINAL SOFT TISSUES: Unremarkable.  INCLUDED THORACIC SPINE AND SACRUM: Unremarkable.  DISCS: There is disc desiccation change at L5-S1.  The following axial levels are imaged and detailed below:  L1-L2: No disc bulging or herniation. No spinal canal or foraminal stenosis. There is bilateral hypertrophic facet joint change.  L2-L3: There is posterior disc bulging and bilateral hypertrophic facet joint change without spinal stenosis or neuroforaminal narrowing..  L3-L4: Posterior disc bulging, bilateral hypertrophic facet joint change and ligament flavum hypertrophy results in mild spinal stenosis and encroachment on the right neuroforamen.  L4-L5: Broad-based posterior disc herniation, anterolisthesis and bilateral hypertrophic facet joint change and ligament flavum hypertrophy results in mild spinal stenosis, left lateral recess stenosis, moderate left neuroforaminal narrowing.  L5-S1: Posterior disc bulge, bilateral hypertrophic facet joint change and ligament flavum hypertrophy results in bilateral lateral recess stenosis.  IMPRESSION:  MRI of the lumbar spine demonstrates:  1.  Mild spinal stenosis and encroachment on the right neuroforamen at L3-4. 2.  Mild spinal stenosis, left lateral recess stenosis, and moderate left neuroforaminal narrowing at L4-5. 3.  Bilateral lateral recess stenosis at L5-S1. 4.  Marrow edema in the pedicles of L5 may represent a stress injury versus degenerative change.    XR AP Lat Cervical 01/17/2024 - s/p C3-7 PCF -reviewed with patient.   I reviewed, interpreted and clinically correlated the following outside imaging studies, MR SPINE LUMBAR - ORDERED BY: SKYE WASHINGTON   PROCEDURE DATE: 02/06/2023    INTERPRETATION: CLINICAL INFORMATION: Bilateral lower extremity pain and radiculopathy.  ADDITIONAL CLINICAL INFORMATION: Low back muscle strain S39.012A  TECHNIQUE: Multiplanar, multisequence MRI was performed of the lumbar spine.  PRIOR STUDIES: No priors available for comparison.  FINDINGS:  LOCALIZER: No additional findings. BONES: There is no fracture or bone marrow edema. Small hemangioma at the superior T11 vertebra. ALIGNMENT: The alignment is normal. SACROILIAC JOINTS/SACRUM: There is no sacral fracture. The SI joints are partially visualized but are intact. CONUS AND CAUDA EQUINA: The distal cord and conus are normal in signal. Conus terminates at L2. VISUALIZED INTRAPELVIC/INTRA-ABDOMINAL SOFT TISSUES: Normal. PARASPINAL SOFT TISSUES: Normal.   INDIVIDUAL LEVELS:  LOWER THORACIC SPINE: No spinal canal or neuroforaminal stenosis.  L1-L2: Moderate left and mild right facet degenerative change. No central canal or foraminal narrowing. L2-L3: Mild to moderate facet degenerative change. No central canal narrowing. No foraminal narrowing. L3-L4: Moderate facet degenerative change. Mild disc bulging. Moderate central canal narrowing. Mild foraminal narrowing. A left foraminal disc protrusion contacts the exiting left L3 nerve root. L4-L5: Severe left and moderate right facet degenerative change. Mild anterolisthesis uncovering the disc space. Osseous ridging and disc bulging is asymmetric to left. Severe central canal stenosis. Moderate to severe left foraminal narrowing with contact of exiting left L4 nerve root. Mild right foraminal narrowing. L5-S1: Moderate facet degenerative change. Posterior osseous ridging and disc bulging. Moderate to severe bilateral foraminal narrowing. No central canal narrowing.   IMPRESSION:  Multilevel spondylosis most severe at L3-4 and L4-5 as above.  --- End of Report ---.  XR AP Lat Lumbar 05/20/2024 -L4-5 spondylolisthesis- reviewed with the patient.   XR AP Lat Lumbar 08/24/2023 -L4-5 spondylolisthesis- reviewed with the patient.   XR AP Lat Cervical 11/13/2023 -C3-7 PCF, adequate- reviewed with the patient.   XR AP Lat Cervical 10/12/23 -C3-7 PCF, adequate- reviewed with patient.

## 2024-05-20 NOTE — ADDENDUM
[FreeTextEntry1] : This note was written by Cristopher Campbell on 05/20/2024 acting as scribe for Dr. Dieter Carbajal M.D.  I, Dieter Carbajal MD, have read and attest that all the information, medical decision making and discharge instructions within are true and accurate.

## 2024-08-08 ENCOUNTER — NON-APPOINTMENT (OUTPATIENT)
Age: 79
End: 2024-08-08

## 2024-08-08 ENCOUNTER — APPOINTMENT (OUTPATIENT)
Dept: NEUROLOGY | Facility: CLINIC | Age: 79
End: 2024-08-08

## 2024-08-08 PROBLEM — R20.2 PARESTHESIA: Status: ACTIVE | Noted: 2024-08-08

## 2024-08-08 PROCEDURE — 99203 OFFICE O/P NEW LOW 30 MIN: CPT

## 2024-08-08 NOTE — ASSESSMENT
[FreeTextEntry1] : This is a  79F with PMH cervical spinal stenosis/radiculopathy s/p fusion C3-7 in 10/23 and lumbar spinal stenosis/radiculopathy who comes in for transient numbness at the top of her head 2 weeks ago, has since self resolved. Currently asymptomatic and reassuring exam.  Reassured the patient that I have a low suspicion for anything concerning. Can f/u with PCP.

## 2024-08-08 NOTE — HISTORY OF PRESENT ILLNESS
[FreeTextEntry1] : This is a 79F with PMH cervical spinal stenosis/radiculopathy s/p fusion C3-7 in 10/23 and lumbar spinal stenosis/radiculopathy and is planned for surgery next months who comes in with paresthesias.  A couple weeks ago she noticed some mild tingling at the top of her head. Lasted a couple seconds before self resolving. Happened on and off over a couple days and has since completely resolved. She was worried that was due to an issue with her neck surgery so she comes in today for evaluation.  Denies headaches, vision changes, weakness, or neck pain.

## 2024-08-08 NOTE — PHYSICAL EXAM
[Person] : oriented to person [Place] : oriented to place [Time] : oriented to time [Fluency] : fluency intact [Cranial Nerves Optic (II)] : visual acuity intact bilaterally,  visual fields full to confrontation, pupils equal round and reactive to light [Cranial Nerves Oculomotor (III)] : extraocular motion intact [Cranial Nerves Trigeminal (V)] : facial sensation intact symmetrically [Cranial Nerves Facial (VII)] : face symmetrical [Cranial Nerves Vestibulocochlear (VIII)] : hearing was intact bilaterally [Cranial Nerves Glossopharyngeal (IX)] : tongue and palate midline [Cranial Nerves Accessory (XI - Cranial And Spinal)] : head turning and shoulder shrug symmetric [Cranial Nerves Hypoglossal (XII)] : there was no tongue deviation with protrusion [Motor Tone] : muscle tone was normal in all four extremities [Motor Strength] : muscle strength was normal in all four extremities [No Muscle Atrophy] : normal bulk in all four extremities [Sensation Tactile Decrease] : light touch was intact [Abnormal Walk] : normal gait [2+] : Patella left 2+

## 2024-08-19 ENCOUNTER — OUTPATIENT (OUTPATIENT)
Dept: OUTPATIENT SERVICES | Facility: HOSPITAL | Age: 79
LOS: 1 days | End: 2024-08-19

## 2024-08-19 VITALS
WEIGHT: 126.1 LBS | HEART RATE: 77 BPM | DIASTOLIC BLOOD PRESSURE: 82 MMHG | TEMPERATURE: 98 F | RESPIRATION RATE: 16 BRPM | HEIGHT: 59 IN | SYSTOLIC BLOOD PRESSURE: 137 MMHG | OXYGEN SATURATION: 98 %

## 2024-08-19 DIAGNOSIS — Z90.49 ACQUIRED ABSENCE OF OTHER SPECIFIED PARTS OF DIGESTIVE TRACT: Chronic | ICD-10-CM

## 2024-08-19 DIAGNOSIS — M48.07 SPINAL STENOSIS, LUMBOSACRAL REGION: ICD-10-CM

## 2024-08-19 DIAGNOSIS — Z98.890 OTHER SPECIFIED POSTPROCEDURAL STATES: Chronic | ICD-10-CM

## 2024-08-19 DIAGNOSIS — Z98.1 ARTHRODESIS STATUS: Chronic | ICD-10-CM

## 2024-08-19 LAB
A1C WITH ESTIMATED AVERAGE GLUCOSE RESULT: 4.6 % — SIGNIFICANT CHANGE UP (ref 4–5.6)
ANION GAP SERPL CALC-SCNC: 15 MMOL/L — HIGH (ref 7–14)
BASOPHILS # BLD AUTO: 0.02 K/UL — SIGNIFICANT CHANGE UP (ref 0–0.2)
BASOPHILS NFR BLD AUTO: 0.3 % — SIGNIFICANT CHANGE UP (ref 0–2)
BLD GP AB SCN SERPL QL: NEGATIVE — SIGNIFICANT CHANGE UP
BUN SERPL-MCNC: 9 MG/DL — SIGNIFICANT CHANGE UP (ref 7–23)
CALCIUM SERPL-MCNC: 9.6 MG/DL — SIGNIFICANT CHANGE UP (ref 8.4–10.5)
CHLORIDE SERPL-SCNC: 104 MMOL/L — SIGNIFICANT CHANGE UP (ref 98–107)
CO2 SERPL-SCNC: 23 MMOL/L — SIGNIFICANT CHANGE UP (ref 22–31)
CREAT SERPL-MCNC: 0.59 MG/DL — SIGNIFICANT CHANGE UP (ref 0.5–1.3)
EGFR: 92 ML/MIN/1.73M2 — SIGNIFICANT CHANGE UP
EOSINOPHIL # BLD AUTO: 0.06 K/UL — SIGNIFICANT CHANGE UP (ref 0–0.5)
EOSINOPHIL NFR BLD AUTO: 0.9 % — SIGNIFICANT CHANGE UP (ref 0–6)
ESTIMATED AVERAGE GLUCOSE: 85 — SIGNIFICANT CHANGE UP
GLUCOSE SERPL-MCNC: 91 MG/DL — SIGNIFICANT CHANGE UP (ref 70–99)
HCT VFR BLD CALC: 38.3 % — SIGNIFICANT CHANGE UP (ref 34.5–45)
HGB BLD-MCNC: 12.4 G/DL — SIGNIFICANT CHANGE UP (ref 11.5–15.5)
IMM GRANULOCYTES NFR BLD AUTO: 0.1 % — SIGNIFICANT CHANGE UP (ref 0–0.9)
LYMPHOCYTES # BLD AUTO: 1.93 K/UL — SIGNIFICANT CHANGE UP (ref 1–3.3)
LYMPHOCYTES # BLD AUTO: 28.3 % — SIGNIFICANT CHANGE UP (ref 13–44)
MCHC RBC-ENTMCNC: 30.5 PG — SIGNIFICANT CHANGE UP (ref 27–34)
MCHC RBC-ENTMCNC: 32.4 GM/DL — SIGNIFICANT CHANGE UP (ref 32–36)
MCV RBC AUTO: 94.1 FL — SIGNIFICANT CHANGE UP (ref 80–100)
MONOCYTES # BLD AUTO: 0.54 K/UL — SIGNIFICANT CHANGE UP (ref 0–0.9)
MONOCYTES NFR BLD AUTO: 7.9 % — SIGNIFICANT CHANGE UP (ref 2–14)
NEUTROPHILS # BLD AUTO: 4.26 K/UL — SIGNIFICANT CHANGE UP (ref 1.8–7.4)
NEUTROPHILS NFR BLD AUTO: 62.5 % — SIGNIFICANT CHANGE UP (ref 43–77)
PLATELET # BLD AUTO: 255 K/UL — SIGNIFICANT CHANGE UP (ref 150–400)
POTASSIUM SERPL-MCNC: 3.9 MMOL/L — SIGNIFICANT CHANGE UP (ref 3.5–5.3)
POTASSIUM SERPL-SCNC: 3.9 MMOL/L — SIGNIFICANT CHANGE UP (ref 3.5–5.3)
RBC # BLD: 4.07 M/UL — SIGNIFICANT CHANGE UP (ref 3.8–5.2)
RBC # FLD: 11.5 % — SIGNIFICANT CHANGE UP (ref 10.3–14.5)
RH IG SCN BLD-IMP: POSITIVE — SIGNIFICANT CHANGE UP
SODIUM SERPL-SCNC: 142 MMOL/L — SIGNIFICANT CHANGE UP (ref 135–145)
WBC # BLD: 6.82 K/UL — SIGNIFICANT CHANGE UP (ref 3.8–10.5)
WBC # FLD AUTO: 6.82 K/UL — SIGNIFICANT CHANGE UP (ref 3.8–10.5)

## 2024-08-19 NOTE — H&P PST ADULT - PROBLEM SELECTOR PLAN 1
Scheduled for L3-5 Lumbar Laminectomy and L4-5 Fusion on 09/03/24  Preop instructions provided and patient verbalizes understanding.  Labs done and results pending.  Famotidine provided with instructions. Hibiclens provided with instructions and was signed by patient. Teach-back method was utilized to assess patient's understanding. Patient verbalized understanding.

## 2024-08-19 NOTE — H&P PST ADULT - IS PATIENT PREGNANT?
Janice Robison Patient Age: 17 year old  MESSAGE: Interpreting service used: No    Insurance on file confirmed with caller: Yes    Pediatrics- Reason for call: Medication Question-     Name of the Pharmacy: [facility]    Name of the medication: norethindrone-ethinyl estradiol-FE (Loestrin Fe 1/20) 1-20 MG-MCG per tablet    Question about: Medication not covered by patient's insurance-      Suggested alternative medication (if provided):     Is the pharmacy calling? No- [facility]- Connect call to Call Center Triage queue [phone] and route message to Call Center Triage Pool [phone]      Message read back to caller for accuracy: Yes       ALLERGIES:  Patient has no known allergies.  Current Outpatient Medications   Medication Sig Dispense Refill   • nitrofurantoin, macrocrystal-monohydrate, (Macrobid) 100 MG capsule Take 1 capsule by mouth in the morning and 1 capsule in the evening. X 5 days 10 capsule 0   • norethindrone-ethinyl estradiol-FE (Loestrin Fe 1/20) 1-20 MG-MCG per tablet Take 1 tablet by mouth daily. 84 tablet 2   • Loratadine (CLARITIN PO)        No current facility-administered medications for this visit.     PHARMACY to use: see below          Pharmacy preference(s) on file:   [id number] - [place], [place] - [address] AT [facility] [address]  [address]  [facility] [address]  Phone: [phone] Fax: [phone]      CALL BACK INFO: Ok to leave response (including medical information) on answering machine      PCP: Maribell Brice CNP         INS: Payor: [employer] [place] / Plan: LABOR FUND GROUPS / Product Type: PPO MISC   PATIENT ADDRESS:  [address] [place] [id number]     no

## 2024-08-19 NOTE — H&P PST ADULT - NSICDXPASTMEDICALHX_GEN_ALL_CORE_FT
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

## 2024-08-19 NOTE — H&P PST ADULT - HISTORY OF PRESENT ILLNESS
79 year old female with pmhx of HLD, Lumbar stenosis, cervical stenosis s/p s/p fusion 2023,  Colon ca s/p partial colectomy (2019), Right eye blindness (from childhood), presents for pre-op evaluation for diagnosis of Spinal Stenosis Lumbosacral Region/ Spondylolisthesis Lumbar Region .  Patient has tried other modalities such as NSAID, PT, and steroid injections without relief and is now experiencing paresthesia.  Patient sent for MRI April 2024 : Mild spinal stenosis and encroachment on the right neuroforamen at L3-4.  Mild spinal stenosis, left lateral recess stenosis, and moderate left neuroforaminal narrowing at L4-5.  Bilateral lateral recess stenosis at L5-S1.  Marrow edema in the pedicles of L5 may represent a stress injury versus degenerative change.  Patient is now scheduled for L3-5 Lumbar Laminectomy and L4-5 Fusion on 09/03/24.  ?

## 2024-08-19 NOTE — H&P PST ADULT - NEGATIVE ENMT SYMPTOMS
no tinnitus/no vertigo/no sinus symptoms/no nasal congestion/no nose bleeds/no gum bleeding/no dysphagia

## 2024-08-19 NOTE — H&P PST ADULT - FUNCTIONAL STATUS
Wheelchair
DASI=6.79 -  Able to climb up 2 flights of stairs, walk up hill and do house chores without symptoms./4-10 METS

## 2024-08-19 NOTE — H&P PST ADULT - NSICDXFAMILYHX_GEN_ALL_CORE_FT
FAMILY HISTORY:  Father  Still living? No  Family history of heart attack, Age at diagnosis: Age Unknown    Mother  Still living? No  FH: diabetes mellitus, Age at diagnosis: Age Unknown

## 2024-08-19 NOTE — H&P PST ADULT - NEGATIVE GENERAL SYMPTOMS
no fever/no chills/no sweating/no anorexia/no weight loss/no weight gain/no malaise/no fatigue no fever/no chills/no sweating/no anorexia/no malaise/no fatigue

## 2024-08-20 LAB
MRSA PCR RESULT.: SIGNIFICANT CHANGE UP
S AUREUS DNA NOSE QL NAA+PROBE: SIGNIFICANT CHANGE UP

## 2024-08-26 ENCOUNTER — NON-APPOINTMENT (OUTPATIENT)
Age: 79
End: 2024-08-26

## 2024-08-26 ENCOUNTER — APPOINTMENT (OUTPATIENT)
Dept: OPHTHALMOLOGY | Facility: CLINIC | Age: 79
End: 2024-08-26
Payer: MEDICARE

## 2024-08-26 PROBLEM — H54.40 BLINDNESS, ONE EYE, UNSPECIFIED EYE: Chronic | Status: ACTIVE | Noted: 2024-08-19

## 2024-08-26 PROBLEM — M43.16 SPONDYLOLISTHESIS, LUMBAR REGION: Chronic | Status: ACTIVE | Noted: 2024-08-19

## 2024-08-26 PROCEDURE — 92250 FUNDUS PHOTOGRAPHY W/I&R: CPT

## 2024-08-26 PROCEDURE — 92014 COMPRE OPH EXAM EST PT 1/>: CPT

## 2024-08-26 PROCEDURE — 92083 EXTENDED VISUAL FIELD XM: CPT

## 2024-08-26 PROCEDURE — 92020 GONIOSCOPY: CPT

## 2024-08-30 NOTE — ASU PATIENT PROFILE, ADULT - VISION (WITH CORRECTIVE LENSES IF THE PATIENT USUALLY WEARS THEM):
legally blind with prothesis in Right eye/Severely impaired: cannot locate objects without hearing or touching them or patient nonresponsive.

## 2024-08-30 NOTE — ASU PATIENT PROFILE, ADULT - NSICDXPASTSURGICALHX_GEN_ALL_CORE_FT
PAST SURGICAL HISTORY:   delivery delivered many yrs ago with tubal ligation    H/O abdominoplasty many yrs ago    H/O colonoscopy     History of cholecystectomy 15 yrs ago    History of partial colectomy     S/P cervical spinal fusion     S/P laparoscopic appendectomy 19

## 2024-08-30 NOTE — ASU PATIENT PROFILE, ADULT - FALL HARM RISK - UNIVERSAL INTERVENTIONS
Bed in lowest position, wheels locked, appropriate side rails in place/Call bell, personal items and telephone in reach/Instruct patient to call for assistance before getting out of bed or chair/Non-slip footwear when patient is out of bed/[place] to call system/Physically safe environment - no spills, clutter or unnecessary equipment/Purposeful Proactive Rounding/Room/bathroom lighting operational, light cord in reach

## 2024-09-02 ENCOUNTER — NON-APPOINTMENT (OUTPATIENT)
Age: 79
End: 2024-09-02

## 2024-09-02 ENCOUNTER — TRANSCRIPTION ENCOUNTER (OUTPATIENT)
Age: 79
End: 2024-09-02

## 2024-09-03 ENCOUNTER — RESULT REVIEW (OUTPATIENT)
Age: 79
End: 2024-09-03

## 2024-09-03 ENCOUNTER — APPOINTMENT (OUTPATIENT)
Dept: ORTHOPEDIC SURGERY | Facility: HOSPITAL | Age: 79
End: 2024-09-03

## 2024-09-03 ENCOUNTER — INPATIENT (INPATIENT)
Facility: HOSPITAL | Age: 79
LOS: 2 days | Discharge: HOME | End: 2024-09-06
Attending: ORTHOPAEDIC SURGERY | Admitting: ORTHOPAEDIC SURGERY
Payer: MEDICARE

## 2024-09-03 VITALS
HEART RATE: 75 BPM | SYSTOLIC BLOOD PRESSURE: 105 MMHG | RESPIRATION RATE: 16 BRPM | DIASTOLIC BLOOD PRESSURE: 77 MMHG | WEIGHT: 126.1 LBS | OXYGEN SATURATION: 99 % | TEMPERATURE: 98 F | HEIGHT: 59 IN

## 2024-09-03 DIAGNOSIS — Z98.890 OTHER SPECIFIED POSTPROCEDURAL STATES: Chronic | ICD-10-CM

## 2024-09-03 DIAGNOSIS — Z90.49 ACQUIRED ABSENCE OF OTHER SPECIFIED PARTS OF DIGESTIVE TRACT: Chronic | ICD-10-CM

## 2024-09-03 DIAGNOSIS — Z98.1 ARTHRODESIS STATUS: Chronic | ICD-10-CM

## 2024-09-03 DIAGNOSIS — M48.07 SPINAL STENOSIS, LUMBOSACRAL REGION: ICD-10-CM

## 2024-09-03 LAB
ADD ON TEST-SPECIMEN IN LAB: SIGNIFICANT CHANGE UP
ADD ON TEST-SPECIMEN IN LAB: SIGNIFICANT CHANGE UP
ALBUMIN SERPL ELPH-MCNC: 4.2 G/DL — SIGNIFICANT CHANGE UP (ref 3.3–5)
ALP SERPL-CCNC: 88 U/L — SIGNIFICANT CHANGE UP (ref 40–120)
ALT FLD-CCNC: 36 U/L — HIGH (ref 4–33)
ANION GAP SERPL CALC-SCNC: 12 MMOL/L — SIGNIFICANT CHANGE UP (ref 7–14)
AST SERPL-CCNC: 68 U/L — HIGH (ref 4–32)
BASOPHILS # BLD AUTO: 0.01 K/UL — SIGNIFICANT CHANGE UP (ref 0–0.2)
BASOPHILS NFR BLD AUTO: 0.1 % — SIGNIFICANT CHANGE UP (ref 0–2)
BILIRUB DIRECT SERPL-MCNC: 0.3 MG/DL — SIGNIFICANT CHANGE UP (ref 0–0.3)
BILIRUB INDIRECT FLD-MCNC: 0.5 MG/DL — SIGNIFICANT CHANGE UP (ref 0–1)
BILIRUB SERPL-MCNC: 0.8 MG/DL — SIGNIFICANT CHANGE UP (ref 0.2–1.2)
BUN SERPL-MCNC: 10 MG/DL — SIGNIFICANT CHANGE UP (ref 7–23)
CALCIUM SERPL-MCNC: 9.5 MG/DL — SIGNIFICANT CHANGE UP (ref 8.4–10.5)
CHLORIDE SERPL-SCNC: 104 MMOL/L — SIGNIFICANT CHANGE UP (ref 98–107)
CO2 SERPL-SCNC: 24 MMOL/L — SIGNIFICANT CHANGE UP (ref 22–31)
CREAT SERPL-MCNC: 0.7 MG/DL — SIGNIFICANT CHANGE UP (ref 0.5–1.3)
EGFR: 88 ML/MIN/1.73M2 — SIGNIFICANT CHANGE UP
EOSINOPHIL # BLD AUTO: 0.01 K/UL — SIGNIFICANT CHANGE UP (ref 0–0.5)
EOSINOPHIL NFR BLD AUTO: 0.1 % — SIGNIFICANT CHANGE UP (ref 0–6)
GLUCOSE BLDC GLUCOMTR-MCNC: 99 MG/DL — SIGNIFICANT CHANGE UP (ref 70–99)
GLUCOSE SERPL-MCNC: 127 MG/DL — HIGH (ref 70–99)
HCT VFR BLD CALC: 36.5 % — SIGNIFICANT CHANGE UP (ref 34.5–45)
HGB BLD-MCNC: 12.2 G/DL — SIGNIFICANT CHANGE UP (ref 11.5–15.5)
IANC: 8.37 K/UL — HIGH (ref 1.8–7.4)
IMM GRANULOCYTES NFR BLD AUTO: 0.6 % — SIGNIFICANT CHANGE UP (ref 0–0.9)
LYMPHOCYTES # BLD AUTO: 1.19 K/UL — SIGNIFICANT CHANGE UP (ref 1–3.3)
LYMPHOCYTES # BLD AUTO: 12.1 % — LOW (ref 13–44)
MAGNESIUM SERPL-MCNC: 1.9 MG/DL — SIGNIFICANT CHANGE UP (ref 1.6–2.6)
MCHC RBC-ENTMCNC: 31.3 PG — SIGNIFICANT CHANGE UP (ref 27–34)
MCHC RBC-ENTMCNC: 33.4 GM/DL — SIGNIFICANT CHANGE UP (ref 32–36)
MCV RBC AUTO: 93.6 FL — SIGNIFICANT CHANGE UP (ref 80–100)
MONOCYTES # BLD AUTO: 0.18 K/UL — SIGNIFICANT CHANGE UP (ref 0–0.9)
MONOCYTES NFR BLD AUTO: 1.8 % — LOW (ref 2–14)
NEUTROPHILS # BLD AUTO: 8.37 K/UL — HIGH (ref 1.8–7.4)
NEUTROPHILS NFR BLD AUTO: 85.3 % — HIGH (ref 43–77)
NRBC # BLD: 0 /100 WBCS — SIGNIFICANT CHANGE UP (ref 0–0)
NRBC # FLD: 0 K/UL — SIGNIFICANT CHANGE UP (ref 0–0)
PHOSPHATE SERPL-MCNC: 2.8 MG/DL — SIGNIFICANT CHANGE UP (ref 2.5–4.5)
PLATELET # BLD AUTO: 216 K/UL — SIGNIFICANT CHANGE UP (ref 150–400)
POTASSIUM SERPL-MCNC: 3.7 MMOL/L — SIGNIFICANT CHANGE UP (ref 3.5–5.3)
POTASSIUM SERPL-SCNC: 3.7 MMOL/L — SIGNIFICANT CHANGE UP (ref 3.5–5.3)
PROT SERPL-MCNC: 7.4 G/DL — SIGNIFICANT CHANGE UP (ref 6–8.3)
RBC # BLD: 3.9 M/UL — SIGNIFICANT CHANGE UP (ref 3.8–5.2)
RBC # FLD: 11.1 % — SIGNIFICANT CHANGE UP (ref 10.3–14.5)
SODIUM SERPL-SCNC: 140 MMOL/L — SIGNIFICANT CHANGE UP (ref 135–145)
WBC # BLD: 9.82 K/UL — SIGNIFICANT CHANGE UP (ref 3.8–10.5)
WBC # FLD AUTO: 9.82 K/UL — SIGNIFICANT CHANGE UP (ref 3.8–10.5)

## 2024-09-03 PROCEDURE — 22612 ARTHRD PST TQ 1NTRSPC LUMBAR: CPT

## 2024-09-03 PROCEDURE — 63030 LAMOT DCMPRN NRV RT 1 LMBR: CPT | Mod: 82,59,LT

## 2024-09-03 PROCEDURE — 63047 LAM FACETEC & FORAMOT LUMBAR: CPT | Mod: 82,59

## 2024-09-03 PROCEDURE — 63047 LAM FACETEC & FORAMOT LUMBAR: CPT | Mod: 59

## 2024-09-03 PROCEDURE — 88313 SPECIAL STAINS GROUP 2: CPT | Mod: 26

## 2024-09-03 PROCEDURE — 63267 EXCISE INTRSPINL LESION LMBR: CPT | Mod: 82

## 2024-09-03 PROCEDURE — 63030 LAMOT DCMPRN NRV RT 1 LMBR: CPT | Mod: 59,LT

## 2024-09-03 PROCEDURE — 22840 INSERT SPINE FIXATION DEVICE: CPT | Mod: 82

## 2024-09-03 PROCEDURE — 63267 EXCISE INTRSPINL LESION LMBR: CPT

## 2024-09-03 PROCEDURE — 88304 TISSUE EXAM BY PATHOLOGIST: CPT | Mod: 26

## 2024-09-03 PROCEDURE — 22612 ARTHRD PST TQ 1NTRSPC LUMBAR: CPT | Mod: 82

## 2024-09-03 PROCEDURE — 22840 INSERT SPINE FIXATION DEVICE: CPT

## 2024-09-03 PROCEDURE — 72100 X-RAY EXAM L-S SPINE 2/3 VWS: CPT | Mod: 26

## 2024-09-03 DEVICE — SCREW SERRATO 6.5X45MM: Type: IMPLANTABLE DEVICE | Status: FUNCTIONAL

## 2024-09-03 DEVICE — BONE WAX 2.5GM: Type: IMPLANTABLE DEVICE | Status: FUNCTIONAL

## 2024-09-03 DEVICE — SCREW SERRATO POLY 6.5X40MM: Type: IMPLANTABLE DEVICE | Status: FUNCTIONAL

## 2024-09-03 DEVICE — ROD RAD 45: Type: IMPLANTABLE DEVICE | Status: FUNCTIONAL

## 2024-09-03 DEVICE — SCREW BLOCKER BONE XIA: Type: IMPLANTABLE DEVICE | Status: FUNCTIONAL

## 2024-09-03 DEVICE — SURGIFOAM PAD 8CM X 12.5CM X 2MM (100C): Type: IMPLANTABLE DEVICE | Status: FUNCTIONAL

## 2024-09-03 DEVICE — SURGIFLO MATRIX WITH THROMBIN KIT: Type: IMPLANTABLE DEVICE | Status: FUNCTIONAL

## 2024-09-03 RX ORDER — SENNA 187 MG
2 TABLET ORAL AT BEDTIME
Refills: 0 | Status: DISCONTINUED | OUTPATIENT
Start: 2024-09-03 | End: 2024-09-06

## 2024-09-03 RX ORDER — HYDROMORPHONE HYDROCHLORIDE 2 MG/1
0.5 TABLET ORAL
Refills: 0 | Status: DISCONTINUED | OUTPATIENT
Start: 2024-09-03 | End: 2024-09-03

## 2024-09-03 RX ORDER — SODIUM CHLORIDE 9 MG/ML
1000 INJECTION INTRAMUSCULAR; INTRAVENOUS; SUBCUTANEOUS
Refills: 0 | Status: DISCONTINUED | OUTPATIENT
Start: 2024-09-03 | End: 2024-09-04

## 2024-09-03 RX ORDER — CEFAZOLIN SODIUM 2 G/100ML
2000 INJECTION, SOLUTION INTRAVENOUS EVERY 8 HOURS
Refills: 0 | Status: COMPLETED | OUTPATIENT
Start: 2024-09-03 | End: 2024-09-04

## 2024-09-03 RX ORDER — POTASSIUM CHLORIDE 10 MEQ
20 TABLET, EXT RELEASE, PARTICLES/CRYSTALS ORAL
Refills: 0 | Status: COMPLETED | OUTPATIENT
Start: 2024-09-03 | End: 2024-09-03

## 2024-09-03 RX ORDER — GABAPENTIN 100 MG
100 CAPSULE ORAL THREE TIMES A DAY
Refills: 0 | Status: DISCONTINUED | OUTPATIENT
Start: 2024-09-04 | End: 2024-09-06

## 2024-09-03 RX ORDER — SODIUM CHLORIDE 9 MG/ML
500 INJECTION INTRAMUSCULAR; INTRAVENOUS; SUBCUTANEOUS ONCE
Refills: 0 | Status: COMPLETED | OUTPATIENT
Start: 2024-09-03 | End: 2024-09-03

## 2024-09-03 RX ORDER — CHLORHEXIDINE GLUCONATE 40 MG/ML
1 SOLUTION TOPICAL ONCE
Refills: 0 | Status: COMPLETED | OUTPATIENT
Start: 2024-09-03 | End: 2024-09-03

## 2024-09-03 RX ORDER — ACETAMINOPHEN 325 MG/1
975 TABLET ORAL ONCE
Refills: 0 | Status: COMPLETED | OUTPATIENT
Start: 2024-09-03 | End: 2024-09-03

## 2024-09-03 RX ORDER — PANTOPRAZOLE SODIUM 40 MG
40 TABLET, DELAYED RELEASE (ENTERIC COATED) ORAL ONCE
Refills: 0 | Status: COMPLETED | OUTPATIENT
Start: 2024-09-03 | End: 2024-09-03

## 2024-09-03 RX ORDER — OXYCODONE HYDROCHLORIDE 5 MG/1
2.5 TABLET ORAL EVERY 4 HOURS
Refills: 0 | Status: DISCONTINUED | OUTPATIENT
Start: 2024-09-03 | End: 2024-09-06

## 2024-09-03 RX ORDER — TRAMADOL HYDROCHLORIDE 200 MG/1
25 TABLET, EXTENDED RELEASE ORAL EVERY 8 HOURS
Refills: 0 | Status: DISCONTINUED | OUTPATIENT
Start: 2024-09-03 | End: 2024-09-06

## 2024-09-03 RX ORDER — FENTANYL CITRATE 50 UG/ML
50 INJECTION INTRAMUSCULAR; INTRAVENOUS
Refills: 0 | Status: DISCONTINUED | OUTPATIENT
Start: 2024-09-03 | End: 2024-09-03

## 2024-09-03 RX ORDER — MAGNESIUM OXIDE TAB 400 MG (240 MG ELEMENTAL MG) 400 (240 MG) MG
800 TAB ORAL ONCE
Refills: 0 | Status: COMPLETED | OUTPATIENT
Start: 2024-09-03 | End: 2024-09-03

## 2024-09-03 RX ORDER — POLYETHYLENE GLYCOL 3350 17 G/17G
17 POWDER, FOR SOLUTION ORAL DAILY
Refills: 0 | Status: DISCONTINUED | OUTPATIENT
Start: 2024-09-03 | End: 2024-09-06

## 2024-09-03 RX ORDER — ONDANSETRON 2 MG/ML
4 INJECTION, SOLUTION INTRAMUSCULAR; INTRAVENOUS ONCE
Refills: 0 | Status: DISCONTINUED | OUTPATIENT
Start: 2024-09-03 | End: 2024-09-03

## 2024-09-03 RX ORDER — CYCLOBENZAPRINE HCL 10 MG
5 TABLET ORAL EVERY 8 HOURS
Refills: 0 | Status: DISCONTINUED | OUTPATIENT
Start: 2024-09-03 | End: 2024-09-06

## 2024-09-03 RX ORDER — PREGABALIN 75 MG/1
75 CAPSULE ORAL ONCE
Refills: 0 | Status: DISCONTINUED | OUTPATIENT
Start: 2024-09-03 | End: 2024-09-03

## 2024-09-03 RX ORDER — TRAMADOL HYDROCHLORIDE 200 MG/1
25 TABLET, EXTENDED RELEASE ORAL ONCE
Refills: 0 | Status: DISCONTINUED | OUTPATIENT
Start: 2024-09-03 | End: 2024-09-03

## 2024-09-03 RX ORDER — ACETAMINOPHEN 325 MG/1
1000 TABLET ORAL EVERY 8 HOURS
Refills: 0 | Status: DISCONTINUED | OUTPATIENT
Start: 2024-09-03 | End: 2024-09-06

## 2024-09-03 RX ORDER — ONDANSETRON 2 MG/ML
4 INJECTION, SOLUTION INTRAMUSCULAR; INTRAVENOUS EVERY 6 HOURS
Refills: 0 | Status: DISCONTINUED | OUTPATIENT
Start: 2024-09-03 | End: 2024-09-03

## 2024-09-03 RX ORDER — OXYCODONE HYDROCHLORIDE 5 MG/1
5 TABLET ORAL EVERY 4 HOURS
Refills: 0 | Status: DISCONTINUED | OUTPATIENT
Start: 2024-09-03 | End: 2024-09-06

## 2024-09-03 RX ORDER — SODIUM CHLORIDE 9 MG/ML
500 INJECTION INTRAMUSCULAR; INTRAVENOUS; SUBCUTANEOUS ONCE
Refills: 0 | Status: DISCONTINUED | OUTPATIENT
Start: 2024-09-03 | End: 2024-09-03

## 2024-09-03 RX ORDER — ONDANSETRON 2 MG/ML
4 INJECTION, SOLUTION INTRAMUSCULAR; INTRAVENOUS EVERY 6 HOURS
Refills: 0 | Status: DISCONTINUED | OUTPATIENT
Start: 2024-09-03 | End: 2024-09-06

## 2024-09-03 RX ORDER — POTASSIUM CHLORIDE 10 MEQ
20 TABLET, EXT RELEASE, PARTICLES/CRYSTALS ORAL ONCE
Refills: 0 | Status: DISCONTINUED | OUTPATIENT
Start: 2024-09-03 | End: 2024-09-03

## 2024-09-03 RX ORDER — PANTOPRAZOLE SODIUM 40 MG
40 TABLET, DELAYED RELEASE (ENTERIC COATED) ORAL
Refills: 0 | Status: DISCONTINUED | OUTPATIENT
Start: 2024-09-03 | End: 2024-09-06

## 2024-09-03 RX ORDER — SODIUM PHOSPHATE, DIBASIC, ANHYDROUS, POTASSIUM PHOSPHATE, MONOBASIC, AND SODIUM PHOSPHATE, MONOBASIC, MONOHYDRATE 852; 155; 130 MG/1; MG/1; MG/1
2 TABLET, COATED ORAL ONCE
Refills: 0 | Status: COMPLETED | OUTPATIENT
Start: 2024-09-03 | End: 2024-09-03

## 2024-09-03 RX ORDER — SODIUM CHLORIDE 9 MG/ML
500 INJECTION INTRAMUSCULAR; INTRAVENOUS; SUBCUTANEOUS ONCE
Refills: 0 | Status: COMPLETED | OUTPATIENT
Start: 2024-09-04 | End: 2024-09-04

## 2024-09-03 RX ADMIN — Medication 30 MILLILITER(S): at 12:09

## 2024-09-03 RX ADMIN — ACETAMINOPHEN 975 MILLIGRAM(S): 325 TABLET ORAL at 11:44

## 2024-09-03 RX ADMIN — Medication 20 MILLIEQUIVALENT(S): at 17:02

## 2024-09-03 RX ADMIN — HYDROMORPHONE HYDROCHLORIDE 0.5 MILLIGRAM(S): 2 TABLET ORAL at 15:48

## 2024-09-03 RX ADMIN — CHLORHEXIDINE GLUCONATE 1 APPLICATION(S): 40 SOLUTION TOPICAL at 12:09

## 2024-09-03 RX ADMIN — OXYCODONE HYDROCHLORIDE 5 MILLIGRAM(S): 5 TABLET ORAL at 18:52

## 2024-09-03 RX ADMIN — TRAMADOL HYDROCHLORIDE 25 MILLIGRAM(S): 200 TABLET, EXTENDED RELEASE ORAL at 22:38

## 2024-09-03 RX ADMIN — OXYCODONE HYDROCHLORIDE 5 MILLIGRAM(S): 5 TABLET ORAL at 19:22

## 2024-09-03 RX ADMIN — PREGABALIN 75 MILLIGRAM(S): 75 CAPSULE ORAL at 11:45

## 2024-09-03 RX ADMIN — SODIUM PHOSPHATE, DIBASIC, ANHYDROUS, POTASSIUM PHOSPHATE, MONOBASIC, AND SODIUM PHOSPHATE, MONOBASIC, MONOHYDRATE 2 TABLET(S): 852; 155; 130 TABLET, COATED ORAL at 19:29

## 2024-09-03 RX ADMIN — SODIUM CHLORIDE 500 MILLILITER(S): 9 INJECTION INTRAMUSCULAR; INTRAVENOUS; SUBCUTANEOUS at 19:36

## 2024-09-03 RX ADMIN — Medication 40 MILLIGRAM(S): at 11:45

## 2024-09-03 RX ADMIN — MAGNESIUM OXIDE TAB 400 MG (240 MG ELEMENTAL MG) 800 MILLIGRAM(S): 400 (240 MG) TAB at 18:23

## 2024-09-03 RX ADMIN — TRAMADOL HYDROCHLORIDE 25 MILLIGRAM(S): 200 TABLET, EXTENDED RELEASE ORAL at 21:38

## 2024-09-03 RX ADMIN — Medication 20 MILLIEQUIVALENT(S): at 18:52

## 2024-09-03 RX ADMIN — SODIUM CHLORIDE 75 MILLILITER(S): 9 INJECTION INTRAMUSCULAR; INTRAVENOUS; SUBCUTANEOUS at 15:49

## 2024-09-03 RX ADMIN — HYDROMORPHONE HYDROCHLORIDE 0.5 MILLIGRAM(S): 2 TABLET ORAL at 16:03

## 2024-09-03 NOTE — PACU DISCHARGE NOTE - AIRWAY PATENCY:
CM continues to follow for discharge planning and/or CM needs. Discharge plan remains that pt will return home with family when medically stable. Home health referral already made. No additional CM needs at this time. Will continue to monitor and update as needed. Satisfactory

## 2024-09-03 NOTE — DISCHARGE NOTE PROVIDER - NSDCMRMEDTOKEN_GEN_ALL_CORE_FT
atorvastatin 10 mg oral tablet: 1 orally once a day (at bedtime)  Caltrate 600 mg oral tablet: 1 tab(s) orally 2 times a day  Centrum multivitamin: 1 tab PO daily -LD  8/27/24  diclofenac sodium 75 mg oral delayed release tablet: 1 tab(s) orally as needed for  moderate pain last dose 8/27/24  gabapentin 300 mg oral capsule: 1 orally once a day (at bedtime) as needed for pain  meloxicam 15 mg oral tablet: 1 tab(s) orally as needed for  moderate pain last dose 8/27/24  PreserVision AREDS 2 oral capsule: 1 cap(s) orally 2 times a day LD 8/27/24   atorvastatin 10 mg oral tablet: 1 orally once a day (at bedtime)  Centrum multivitamin: 1 tab PO daily -LD  8/27/24  cyclobenzaprine 5 mg oral tablet: 1 tab(s) orally every 8 hours as needed for Muscle Spasm  gabapentin 100 mg oral capsule: 1 cap(s) orally 3 times a day MDD: 3  Narcan 4 mg/0.1 mL nasal spray: 1 spray(s) intranasally once MDD: 1  oxyCODONE 5 mg oral tablet: 1 tab(s) orally every 4 hours as needed for Severe Pain (7 - 10) May take 1-2 tab orally every 4-6 hr as needed for pain MDD: 6  pantoprazole 40 mg oral delayed release tablet: 1 tab(s) orally once a day (before a meal) MDD: 1  PreserVision AREDS 2 oral capsule: 1 cap(s) orally 2 times a day LD 8/27/24  senna leaf extract oral tablet: 2 tab(s) orally once a day (at bedtime) MDD: 2  traMADol 50 mg oral tablet: 0.5 tab(s) orally every 8 hours MDD: 1.5

## 2024-09-03 NOTE — PHYSICAL THERAPY INITIAL EVALUATION ADULT - GAIT DEVIATIONS NOTED, PT EVAL
[facility] Transitions Follow Up Call    2018    Patient: Shiloh Perea  Patient : 1934   MRN: [id number]  Reason for Admission: There are no discharge diagnoses documented for the most recent discharge. Discharge Date: 18 RARS: Readmission Risk Score: 17       Spoke with: TALIB SANTOS Transitions Subsequent and Final Call    Subsequent and Final Calls  Are you currently active with any services?:  Home Health  Care Transitions Interventions  Other Interventions: Follow Up:  Final CTC phone visit outreach to Homero Bingham @ home. Pleasant and talkative, no SOB, voice sounds strong. Homero Bingham states she is weaning her oxygen 5-6hrs daily without use. Her pulse ox continues to run 99%, she states, as she monitors. She is gaining and sustaining weight and is up to 91.8#.  Ambulating with her cane about 100feet total, continuing to strengthen. Homero Bingham has a follow up with Dr Teofilo Denton for . She is agreeable for CTC to finalize our episode today.    Future Appointments  Date Time Provider Robert Castro   2018 11:20 AM Karina Ruvalcaba MD [facility], RN
decreased chris/decreased step length

## 2024-09-03 NOTE — PHYSICAL THERAPY INITIAL EVALUATION ADULT - ADDITIONAL COMMENTS
Patient lives alone in private home, 4 steps to enter. Patient was independent in all ADL prior to admission. Patient was not using an assistive device prior to admission.

## 2024-09-03 NOTE — DISCHARGE NOTE PROVIDER - NSDCCPTREATMENT_GEN_ALL_CORE_FT
PRINCIPAL PROCEDURE  Procedure: Posterior fusion of lumbar spine with laminectomy  Findings and Treatment: Pain control:        -Acetaminophen 500mg - 2 tabs every 8 hours  As needed:        -Flexeril 5mg - 1 tab three times a day - Take as needed for musle spasms        -Gabapentin 100mg - 1 tab every 8 hours        -Tramadol 50mg - 1 tab every 6 hours - Take only if needed for MODERATE pain       -Oxycodone 5mg - 1 tab every 4-6 hours - Take only if needed for SEVERE or BREAKTHROUGH pain  Oxycodone and Tramadol have been sent to your pharmacy. Please do not drive, operate machinery, or make important decisions while taking these medications.   Other Medications:  -Protonix 40mg - 1 tab every 24 hours - to prevent stomach irritation/ulcers  -Senna 8.6mg - 2 pills every 24 hours - stool softener  -Miralax 17g - daily - constipation   Follow up: Please follow up at your prescheduled post-operative follow up appointment with Dr. Carbajal for 2 weeks after hospital discharge. Please call with any questions or concerns including fevers/chills, worsening pain, pus from the wounds, redness of the skin, new or worsening trouble when you swallow, worsening hoarsness or trouble speaking, difficulty breathing or heaviness in the chest at [phone].   Please seek immediate care or call 911 if:   -Your bandage begins to soak with blood  -Your surgical wound breaks open  -You have severe back or leg pain  -You cannot control your bladder or bowel movements  -You have a high fever with nausea and vomiting  -You have painful swelling in your neck AND trouble swallowing  -You have new or worsening trouble moving your neck, arms, or legs

## 2024-09-03 NOTE — PROGRESS NOTE ADULT - ASSESSMENT
A/P: Patient is a 79y y/o Female s/p L3-5 decompression and L4-5 fusion, POD #0  -Pain control/analgesia  -Antibiotics - Ancef postop  -Incentive spirometry  -Venodynes  -F/U AM Labs  -PT/OT/WBAT  -Harrison - monitor output  -Monitor ELIE output  -Notify orthopedics with any questions A/P: Patient is a 79y y/o Female s/p L3-5 decompression and L4-5 fusion, POD #0  -Pain control/analgesia  -Antibiotics - Ancef postop  -Incentive spirometry  -Venodynes  -F/U AM Labs  -PT/OT/WBAT  -Harrison - monitor output  -Monitor ELIE output  -Notify orthopedics with any questions     Patient doing well post-operatively. Resolved pre-operative leg pains, incisional pain controlled. I discussed with above, discussed with daughter, Dr. Dieter Carbajal.

## 2024-09-03 NOTE — DISCHARGE NOTE PROVIDER - HOSPITAL COURSE
This is a 80yo Female with PMH of HLD, colon CA s/p partial resection, R eye blindness, L eye glaucoma who presents to [facility] for orthopedic surgery. Patient s/p L3-5 decompression, L4-5 fusion with Dr. Carbajal on 9/3/24. Patient tolerated the procedure well without any intraoperative complications. Patient tolerated physical therapy, weight bearing as tolerated and pain was controlled. Seen by medical attending for continuity of care and management and cleared for safe discharge. As per surgeon, the patient is stable and ready for discharge. DO NOT take any NSAIDS (motrin, advil, ibuprofen, aleve), Aspirin, Anti-inflammatory medications unless instructed by your orthopedic surgeon to continue. Avoid any heavy lifting, bending, squatting, or twisting motions. Keep dressing/incision clean, dry and intact, may remove dressing two days after discharge and leave incision open to air. Any sutures/staples to be removed on post-op day #14 at your office visit. Please follow up with Dr. Carbajal in 2 weeks. Please follow up with your PMD for continuity of care and management as medications may have changed.

## 2024-09-03 NOTE — PROGRESS NOTE ADULT - SUBJECTIVE AND OBJECTIVE BOX
Orthopedics Post-Op Check:  Patient was seen and examined at bedside. Denies CP/SOB/Dizziness, N/V/D, HA. Pt states prior to surgery pain was located in BL LE.  States pain is controlled with no radiation to extremities at this time.    Vital Signs Last 24 Hrs  T(C): 36.2 (03 Sep 2024 16:00), Max: 36.4 (03 Sep 2024 11:15)  T(F): 97.2 (03 Sep 2024 16:00), Max: 97.5 (03 Sep 2024 11:15)  HR: 78 (03 Sep 2024 16:15) (75 - 92)  BP: 126/67 (03 Sep 2024 16:00) (105/77 - 139/74)  BP(mean): 86 (03 Sep 2024 16:00) (86 - 98)  RR: 12 (03 Sep 2024 16:15) (12 - 19)  SpO2: 100% (03 Sep 2024 16:15) (94% - 100%)    Parameters below as of 03 Sep 2024 16:00  Patient On (Oxygen Delivery Method): room air    Labs:                        12.2   9.82  )-----------( 216      ( 03 Sep 2024 15:51 )             36.5   09-03    140  |  104  |  10  ----------------------------<  127<H>  3.7   |  24  |  0.70    Ca    9.5      03 Sep 2024 15:51    Physical Exam:  Gen: NAD  Back: Dressing C/D/I, ELIE (sewn) in place  BL LE:   Motor intact 5/5 EHL/FHL/TA/GS. Sensation is grossly intact.   Compartments are soft, extremities are warm, DP 2+

## 2024-09-03 NOTE — PATIENT PROFILE ADULT - NSPROGENBLOODRESTRICT_GEN_A_NUR
Health Maintenance Due   Topic Date Due    DTaP/Tdap/Td Vaccine (1 - Tdap) Never done    Colorectal Cancer Screen  Never done    Lung Cancer Screening  Never done    Breast Cancer Screening  06/17/2023    Shingles Vaccine (2 of 2) 12/26/2023    Medicare Advantage- Medicare Wellness Visit  01/01/2024    Depression Screening  08/09/2024       Patient is due for topics listed above, she wishes to proceed with Depression Screening , but is not proceeding with Immunization(s) Dtap/Tdap/Td and Shingles, Colorectal Cancer Screening: Colonoscopy, iFOBT, Cologuard, and Sigmoidoscopy, Lung Cancer Screening, Mammogram, and MWV (Medicare Wellness Visit) at this time. The following has occurred: will schedule mwv.    Review Flowsheet  More data exists         4/10/2024   PHQ 2/9 Score   Adult PHQ 2 Score 2   Adult PHQ 2 Interpretation No further screening needed   Little interest or pleasure in activity? Not at all   Feeling down, depressed or hopeless? More than half the days         none

## 2024-09-03 NOTE — DISCHARGE NOTE PROVIDER - CARE PROVIDER_API CALL
Dieter Carbajal  Orthopaedic Surgery  [address], [address]  [place] [address]  Phone: [phone]  Fax: [phone]  Follow Up Time:

## 2024-09-03 NOTE — DISCHARGE NOTE PROVIDER - NSDCFUSCHEDAPPT_GEN_ALL_CORE_FT
Dieter Carbajal  [facility]  ORTHOSURG [address]  Scheduled Appointment: 09/16/2024

## 2024-09-03 NOTE — BRIEF OPERATIVE NOTE - NSICDXBRIEFPROCEDURE_GEN_ALL_CORE_FT
PROCEDURES:  Posterior fusion of lumbar spine with laminectomy 03-Sep-2024 15:25:15  Margret Agrawal

## 2024-09-03 NOTE — DISCHARGE NOTE PROVIDER - NSDCCPCAREPLAN_GEN_ALL_CORE_FT
PRINCIPAL DISCHARGE DIAGNOSIS  Diagnosis: Lumbar spinal stenosis  Assessment and Plan of Treatment: IMPORTANT: *DO NOT resume any anticoagulation/blood thinners (Aspirin, Plavix, Eliquis, Coumadin, Xarelto, ect.) until instructed by your surgeon.*  *DO NOT take any NSAIDs (Motrin, Aleve, Advil, Ibuprofen, Celebrex, ect.) until instructed by  your surgeon.*  Diet: Continue regular diet upon discharge.   Activity: No heavy lifting. Avoid straining or excessive activity. DO NOT sit for long periods of time.   -DO NOT bend, twist, or lift heavy objects for at least 3 months.   -DO NOT drive until cleared by your surgeon.   -To reduce strain/pressure on your spine place a pillow under your knees when lying on your back. Place a pillow between your knees when lying on your side.   -When you sit put your feet up on a foot rest. DO NOT lie on your stomach or with your legs flat.  -If you need to bend over, bend at your knees, not your back.  -We encourage you to take many short walks after your surgery to help blood move through your body and to prevent blood clots from forming. If you feel weak or dizzy, sit or lie down right away.   Dressings: Keep dressing clean, dry, and intact. Remove all cotton and yellow gauze 72 hours after surgery. You do not need to put a new dressing on your wound unless there is light drainage. If that occurs place Band-Aids on your wound.   Other Care:  -You may shower 72 hours after surgery but DO NOT soak dressing and/or incision. The water may run over your dressing/incision but DO NOT let the water directly hit your dressing/incision (take a shower with your wound away from the direct stream of water).  NO hot tubes, NO bath tubs, NO swimming pools.

## 2024-09-04 LAB
ANION GAP SERPL CALC-SCNC: 12 MMOL/L — SIGNIFICANT CHANGE UP (ref 7–14)
BASOPHILS # BLD AUTO: 0.01 K/UL — SIGNIFICANT CHANGE UP (ref 0–0.2)
BASOPHILS NFR BLD AUTO: 0.1 % — SIGNIFICANT CHANGE UP (ref 0–2)
BUN SERPL-MCNC: 8 MG/DL — SIGNIFICANT CHANGE UP (ref 7–23)
CALCIUM SERPL-MCNC: 9.1 MG/DL — SIGNIFICANT CHANGE UP (ref 8.4–10.5)
CHLORIDE SERPL-SCNC: 104 MMOL/L — SIGNIFICANT CHANGE UP (ref 98–107)
CO2 SERPL-SCNC: 23 MMOL/L — SIGNIFICANT CHANGE UP (ref 22–31)
CREAT SERPL-MCNC: 0.58 MG/DL — SIGNIFICANT CHANGE UP (ref 0.5–1.3)
EGFR: 92 ML/MIN/1.73M2 — SIGNIFICANT CHANGE UP
EOSINOPHIL # BLD AUTO: 0 K/UL — SIGNIFICANT CHANGE UP (ref 0–0.5)
EOSINOPHIL NFR BLD AUTO: 0 % — SIGNIFICANT CHANGE UP (ref 0–6)
GLUCOSE SERPL-MCNC: 135 MG/DL — HIGH (ref 70–99)
HCT VFR BLD CALC: 34.1 % — LOW (ref 34.5–45)
HGB BLD-MCNC: 11.9 G/DL — SIGNIFICANT CHANGE UP (ref 11.5–15.5)
IANC: 12.78 K/UL — HIGH (ref 1.8–7.4)
IMM GRANULOCYTES NFR BLD AUTO: 0.7 % — SIGNIFICANT CHANGE UP (ref 0–0.9)
LYMPHOCYTES # BLD AUTO: 1.06 K/UL — SIGNIFICANT CHANGE UP (ref 1–3.3)
LYMPHOCYTES # BLD AUTO: 7.3 % — LOW (ref 13–44)
MCHC RBC-ENTMCNC: 32.9 PG — SIGNIFICANT CHANGE UP (ref 27–34)
MCHC RBC-ENTMCNC: 34.9 GM/DL — SIGNIFICANT CHANGE UP (ref 32–36)
MCV RBC AUTO: 94.2 FL — SIGNIFICANT CHANGE UP (ref 80–100)
MONOCYTES # BLD AUTO: 0.53 K/UL — SIGNIFICANT CHANGE UP (ref 0–0.9)
MONOCYTES NFR BLD AUTO: 3.7 % — SIGNIFICANT CHANGE UP (ref 2–14)
NEUTROPHILS # BLD AUTO: 12.78 K/UL — HIGH (ref 1.8–7.4)
NEUTROPHILS NFR BLD AUTO: 88.2 % — HIGH (ref 43–77)
NRBC # BLD: 0 /100 WBCS — SIGNIFICANT CHANGE UP (ref 0–0)
NRBC # FLD: 0 K/UL — SIGNIFICANT CHANGE UP (ref 0–0)
PLATELET # BLD AUTO: 222 K/UL — SIGNIFICANT CHANGE UP (ref 150–400)
POTASSIUM SERPL-MCNC: 4.4 MMOL/L — SIGNIFICANT CHANGE UP (ref 3.5–5.3)
POTASSIUM SERPL-SCNC: 4.4 MMOL/L — SIGNIFICANT CHANGE UP (ref 3.5–5.3)
RBC # BLD: 3.62 M/UL — LOW (ref 3.8–5.2)
RBC # FLD: 11.2 % — SIGNIFICANT CHANGE UP (ref 10.3–14.5)
SODIUM SERPL-SCNC: 139 MMOL/L — SIGNIFICANT CHANGE UP (ref 135–145)
WBC # BLD: 14.48 K/UL — HIGH (ref 3.8–10.5)
WBC # FLD AUTO: 14.48 K/UL — HIGH (ref 3.8–10.5)

## 2024-09-04 PROCEDURE — 99222 1ST HOSP IP/OBS MODERATE 55: CPT

## 2024-09-04 RX ADMIN — SODIUM CHLORIDE 500 MILLILITER(S): 9 INJECTION INTRAMUSCULAR; INTRAVENOUS; SUBCUTANEOUS at 05:43

## 2024-09-04 RX ADMIN — ACETAMINOPHEN 1000 MILLIGRAM(S): 325 TABLET ORAL at 15:45

## 2024-09-04 RX ADMIN — CEFAZOLIN SODIUM 100 MILLIGRAM(S): 2 INJECTION, SOLUTION INTRAVENOUS at 07:52

## 2024-09-04 RX ADMIN — Medication 100 MILLIGRAM(S): at 14:45

## 2024-09-04 RX ADMIN — Medication 2 TABLET(S): at 22:16

## 2024-09-04 RX ADMIN — Medication 40 MILLIGRAM(S): at 05:23

## 2024-09-04 RX ADMIN — Medication 10 MILLIGRAM(S): at 22:17

## 2024-09-04 RX ADMIN — TRAMADOL HYDROCHLORIDE 25 MILLIGRAM(S): 200 TABLET, EXTENDED RELEASE ORAL at 07:52

## 2024-09-04 RX ADMIN — TRAMADOL HYDROCHLORIDE 25 MILLIGRAM(S): 200 TABLET, EXTENDED RELEASE ORAL at 23:16

## 2024-09-04 RX ADMIN — ACETAMINOPHEN 1000 MILLIGRAM(S): 325 TABLET ORAL at 23:16

## 2024-09-04 RX ADMIN — TRAMADOL HYDROCHLORIDE 25 MILLIGRAM(S): 200 TABLET, EXTENDED RELEASE ORAL at 22:16

## 2024-09-04 RX ADMIN — POLYETHYLENE GLYCOL 3350 17 GRAM(S): 17 POWDER, FOR SOLUTION ORAL at 14:46

## 2024-09-04 RX ADMIN — Medication 100 MILLIGRAM(S): at 22:16

## 2024-09-04 RX ADMIN — ACETAMINOPHEN 1000 MILLIGRAM(S): 325 TABLET ORAL at 14:45

## 2024-09-04 RX ADMIN — ACETAMINOPHEN 1000 MILLIGRAM(S): 325 TABLET ORAL at 06:27

## 2024-09-04 RX ADMIN — CEFAZOLIN SODIUM 100 MILLIGRAM(S): 2 INJECTION, SOLUTION INTRAVENOUS at 01:18

## 2024-09-04 RX ADMIN — SODIUM CHLORIDE 75 MILLILITER(S): 9 INJECTION INTRAMUSCULAR; INTRAVENOUS; SUBCUTANEOUS at 05:43

## 2024-09-04 RX ADMIN — ACETAMINOPHEN 1000 MILLIGRAM(S): 325 TABLET ORAL at 22:16

## 2024-09-04 RX ADMIN — Medication 100 MILLIGRAM(S): at 05:30

## 2024-09-04 RX ADMIN — ACETAMINOPHEN 1000 MILLIGRAM(S): 325 TABLET ORAL at 05:27

## 2024-09-04 NOTE — CONSULT NOTE ADULT - SUBJECTIVE AND OBJECTIVE BOX
Patient is a 79y old  Female who presents with a chief complaint of s/p L3-5 decompression, L4-5 fusion (03 Sep 2024 19:25)      HPI:      Allergies  No Known Allergies    Intolerances        HOME MEDICATIONS: Reviewed    MEDICATIONS  (STANDING):  acetaminophen     Tablet .. 1000 milliGRAM(s) Oral every 8 hours  gabapentin 100 milliGRAM(s) Oral three times a day  pantoprazole    Tablet 40 milliGRAM(s) Oral before breakfast  polyethylene glycol 3350 17 Gram(s) Oral daily  senna 2 Tablet(s) Oral at bedtime  traMADol 25 milliGRAM(s) Oral every 8 hours    MEDICATIONS  (PRN):  bisacodyl 5 milliGRAM(s) Oral every 12 hours PRN Constipation  cyclobenzaprine 5 milliGRAM(s) Oral every 8 hours PRN Muscle Spasm  ondansetron Injectable 4 milliGRAM(s) IV Push every 6 hours PRN Nausea and/or Vomiting  oxyCODONE    IR 2.5 milliGRAM(s) Oral every 4 hours PRN Moderate Pain (4 - 6)  oxyCODONE    IR 5 milliGRAM(s) Oral every 4 hours PRN Severe Pain (7 - 10)      PAST MEDICAL & SURGICAL HISTORY:  Glaucoma  left eye      Blindness  right eye, pt wears prosthesis on her right eye      Arthritis      Hypercholesteremia      Colon cancer      History of appendicitis      Osteoarthritis      Lumbar stenosis      Spinal stenosis, cervical region      Spondylolisthesis, lumbar region      Blindness of right eye      History of cholecystectomy  15 yrs ago      H/O colonoscopy   delivery delivered  many yrs ago with tubal ligation  H/O abdominoplasty  many yrs ago  S/P laparoscopic appendectomy  19  History of partial colectomy  S/P cervical spinal fusion      SOCIAL HISTORY:      FAMILY HISTORY:  Family history of heart attack (Father)  FH: diabetes mellitus (Mother)        REVIEW OF SYSTEMS:    [] Unable to obtain due to poor mental status    Vital Signs Last 24 Hrs  T(C): 36.9 (04 Sep 2024 09:16), Max: 36.9 (04 Sep 2024 09:16)  T(F): 98.5 (04 Sep 2024 09:16), Max: 98.5 (04 Sep 2024 09:16)  HR: 86 (04 Sep 2024 09:16) (75 - 97)  BP: 108/58 (04 Sep 2024 09:16) (105/61 - 139/74)  BP(mean): 76 (03 Sep 2024 20:00) (76 - 98)  RR: 18 (04 Sep 2024 09:16) (12 - 19)  SpO2: 96% (04 Sep 2024 09:16) (94% - 100%)    Parameters below as of 04 Sep 2024 09:16  Patient On (Oxygen Delivery Method): room air      CAPILLARY BLOOD GLUCOSE      POCT Blood Glucose.: 99 mg/dL (03 Sep 2024 11:37)      PHYSICAL EXAM:      LABS:                        11.9   14.48 )-----------( 222      ( 04 Sep 2024 05:18 )             34.1     09-04    139  |  104  |  8   ----------------------------<  135<H>  4.4   |  23  |  0.58    Ca    9.1      04 Sep 2024 05:18  Phos  2.8     03  Mg     1.90     03    TPro  7.4  /  Alb  4.2  /  TBili  0.8  /  DBili  0.3  /  AST  68<H>  /  ALT  36<H>  /  AlkPhos  88  09-03      Urinalysis Basic - ( 04 Sep 2024 05:18 )    Color: x / Appearance: x / SG: x / pH: x  Gluc: 135 mg/dL / Ketone: x  / Bili: x / Urobili: x   Blood: x / Protein: x / Nitrite: x   Leuk Esterase: x / RBC: x / WBC x   Sq Epi: x / Non Sq Epi: x / Bacteria: x      CAPILLARY BLOOD GLUCOSE      POCT Blood Glucose.: 99 mg/dL (03 Sep 2024 11:37)      RADIOLOGY & ADDITIONAL STUDIES:    Imaging:   Personally Reviewed:  [ ] YES               EKG:   Personally Reviewed:  [ ] YES       Consultant(s) notes reviewed:    Care Discussed with Consultant(s)/Other Providers:  Care Discussed with Primary Team.      [] Increased delirium risk  [] Delirium and other risks can be reduced by:          -early ambulation          -minimizing "tethers" - IV, oxygen, catheters, etc          -avoiding hypnotics and sedatives          -maintaining hydration/nutrition          -avoid anticholinergics - diphenhydramine, etc          -pain control          -supportive environment   Patient is a 79y old  Female who presents with a chief complaint of s/p L3-5 decompression, L4-5 fusion (03 Sep 2024 19:25)      HPI:  80 yo F w/ HLD, colon cancer s/p partial colectomy, spinal stenosis presenting with back pain and paresthesias, no significant relief noted with NSAIDs, physical therapy, steroid injections, MRI noted spinal stenosis and neuroforaminal narrowing, admitted for orthopedic procedure, s/p L3-L5 decompression and L4-L5 fusion on 9/3, currently POD#1, doing well.     Patient reports pain controlled, went for PT earlier today, denies nausea/vomiting, is able to eat well. While no BM yet, reports able to have flatus. Slight cough, but no SOB or significant throat irritation. Hopeful that cortes will come out soon. No significant change in numbness, still have some slight paresthesias in R hand, but that is a pre-existing symptom.     In terms of home medications, takes gabapentin for nerve pain, on atorvastatin for HLD. No other concerns at this time, eager for continued clinical improvement.     Allergies  No Known Allergies    Intolerances    HOME MEDICATIONS: Reviewed    MEDICATIONS  (STANDING):  acetaminophen     Tablet .. 1000 milliGRAM(s) Oral every 8 hours  gabapentin 100 milliGRAM(s) Oral three times a day  pantoprazole    Tablet 40 milliGRAM(s) Oral before breakfast  polyethylene glycol 3350 17 Gram(s) Oral daily  senna 2 Tablet(s) Oral at bedtime  traMADol 25 milliGRAM(s) Oral every 8 hours    MEDICATIONS  (PRN):  bisacodyl 5 milliGRAM(s) Oral every 12 hours PRN Constipation  cyclobenzaprine 5 milliGRAM(s) Oral every 8 hours PRN Muscle Spasm  ondansetron Injectable 4 milliGRAM(s) IV Push every 6 hours PRN Nausea and/or Vomiting  oxyCODONE    IR 2.5 milliGRAM(s) Oral every 4 hours PRN Moderate Pain (4 - 6)  oxyCODONE    IR 5 milliGRAM(s) Oral every 4 hours PRN Severe Pain (7 - 10)      PAST MEDICAL & SURGICAL HISTORY:  Glaucoma left eye  Blindness right eye, pt wears prosthesis on her right eye  Osteoarthritis  Hypercholesteremia  Colon cancer w/ History of partial colectomy  Lumbar stenosis  Spondylolisthesis, lumbar region  Spinal stenosis, cervical region S/P cervical spinal fusion  History of appendicitis S/P laparoscopic appendectomy  History of cholecystectomy 15 yrs ago  H/O colonoscopy   delivery delivered many yrs ago with tubal ligation  H/O abdominoplasty many yrs ago    SOCIAL HISTORY:  Former smoker    FAMILY HISTORY:  Family history of heart attack (Father)  FH: diabetes mellitus (Mother)      REVIEW OF SYSTEMS:  As per HPI, otherwise negative    [] Unable to obtain due to poor mental status    Vital Signs Last 24 Hrs  T(C): 36.9 (04 Sep 2024 09:16), Max: 36.9 (04 Sep 2024 09:16)  T(F): 98.5 (04 Sep 2024 09:16), Max: 98.5 (04 Sep 2024 09:16)  HR: 86 (04 Sep 2024 09:16) (75 - 97)  BP: 108/58 (04 Sep 2024 09:16) (105/61 - 139/74)  BP(mean): 76 (03 Sep 2024 20:00) (76 - 98)  RR: 18 (04 Sep 2024 09:16) (12 - 19)  SpO2: 96% (04 Sep 2024 09:16) (94% - 100%)    Parameters below as of 04 Sep 2024 09:16  Patient On (Oxygen Delivery Method): room air    CAPILLARY BLOOD GLUCOSE    POCT Blood Glucose.: 99 mg/dL (03 Sep 2024 11:37)      PHYSICAL EXAM:  GEN: NAD, lying in bed  HEAD: NC/AT  EYES: +glasses, R eye visual deficit  ENT: MMM, hearing intact to voice  NECK: Supple  RESPIRATORY: comfortable on RA, speaking in full sentences, no respiratory distress on RA  ABDOMEN: soft, non-tender  EXTREMITIES: no c/c/e  : +cortes  SKIN: +drain    LABS:                        11.9   14.48 )-----------( 222      ( 04 Sep 2024 05:18 )             34.1     09-04    139  |  104  |  8   ----------------------------<  135<H>  4.4   |  23  |  0.58    Ca    9.1      04 Sep 2024 05:18  Phos  2.8     09-03  Mg     1.90     09-03    TPro  7.4  /  Alb  4.2  /  TBili  0.8  /  DBili  0.3  /  AST  68<H>  /  ALT  36<H>  /  AlkPhos  88  09-03      Urinalysis Basic - ( 04 Sep 2024 05:18 )    Color: x / Appearance: x / SG: x / pH: x  Gluc: 135 mg/dL / Ketone: x  / Bili: x / Urobili: x   Blood: x / Protein: x / Nitrite: x   Leuk Esterase: x / RBC: x / WBC x   Sq Epi: x / Non Sq Epi: x / Bacteria: x      CAPILLARY BLOOD GLUCOSE      POCT Blood Glucose.: 99 mg/dL (03 Sep 2024 11:37)      RADIOLOGY & ADDITIONAL STUDIES:    Imaging:   Personally Reviewed:  [ ] YES   < from: MR Lumbar Spine No Cont (23 @ 18:27) >    IMPRESSION:    Multilevel spondylosis most severe at L3-4 and L4-5 as above.    < end of copied text >        Consultant(s) notes reviewed:  Ortho, otpt records via [facility] including GI, Neuro visits  Care Discussed with Consultant(s)/Other Providers: Ortho Rosalina re: can restart home statin  Care Discussed with Primary Team.      [] Increased delirium risk  [] Delirium and other risks can be reduced by:          -early ambulation          -minimizing "tethers" - IV, oxygen, catheters, etc          -avoiding hypnotics and sedatives          -maintaining hydration/nutrition          -avoid anticholinergics - diphenhydramine, etc          -pain control          -supportive environment

## 2024-09-04 NOTE — PROGRESS NOTE ADULT - ASSESSMENT
Patient is a 79y y/o Female s/p L3-5 decompression and L4-5 fusion with bilateral muscle flap closure    PLAN  - pain control  - abx  - dressing in place: gauze/tegederms  - ELIE drain care  - remainder of care per primary

## 2024-09-04 NOTE — CONSULT NOTE ADULT - TIME BILLING
Reviewing labs/vitals/imaging/outpatient records/consultant notes, discussing with ortho, interviewing/examining patient, discussing plan, documentation

## 2024-09-04 NOTE — OCCUPATIONAL THERAPY INITIAL EVALUATION ADULT - GENERAL OBSERVATIONS, REHAB EVAL
Patient found semi-reclined in bed, NAD, and able to follow directions. Vitals: HR 84 BPM. Patient agreeable to participate in skilled OT evaluation.

## 2024-09-04 NOTE — PROGRESS NOTE ADULT - SUBJECTIVE AND OBJECTIVE BOX
ORTHOPAEDIC PROGRESS NOTE    SUBJECTIVE:  Pt seen and examined at bedside this am.  Doing well.  No acute events overnight.  Pt states pain is well controlled    OBJECTIVE:  Vital Signs Last 24 Hrs  T(C): 36.3 (04 Sep 2024 06:55), Max: 36.7 (04 Sep 2024 01:32)  T(F): 97.4 (04 Sep 2024 06:55), Max: 98.1 (04 Sep 2024 01:32)  HR: 97 (04 Sep 2024 06:55) (75 - 97)  BP: 108/78 (04 Sep 2024 06:55) (105/61 - 139/74)  BP(mean): 76 (03 Sep 2024 20:00) (76 - 98)  RR: 17 (04 Sep 2024 06:55) (12 - 19)  SpO2: 95% (04 Sep 2024 06:55) (94% - 100%)    Parameters below as of 04 Sep 2024 06:55  Patient On (Oxygen Delivery Method): room air        Physical Exam:  General: NAD; resting comfrotably in bed  Resp: non labored  BACK  Drain w sanguinous output  Dressing c.d.i  RUE:  Delt 5/5 Bi 5/5 Tri 5/5 Wrist ext 5/5  5/5  SILT C5-T1  2+ radial pulse    LUE:  Delt 5/5 Bi 5/5 Tri 5/5 Wrist ext 5/5  5/5  SILT C5-T1  2+ radial pulse    RLE:  IP 5/5 HS 5/5 Q 5/5 GS 5/5 TA 5/5 EHL 5/5   SILT L2-S1  2+ DP/PT pulses    LLE:  IP 5/5 HS 5/5 Q 5/5 GS 5/5 TA 5/5 EHL 5/5  SILT L2-S1  2+ DP/PT pulses    LABS                        12.2   9.82  )-----------( 216      ( 03 Sep 2024 15:51 )             36.5       09-03    140  |  104  |  10  ----------------------------<  127<H>  3.7   |  24  |  0.70    Ca    9.5      03 Sep 2024 15:51  Phos  2.8     09-03  Mg     1.90     09-03    TPro  7.4  /  Alb  4.2  /  TBili  0.8  /  DBili  0.3  /  AST  68<H>  /  ALT  36<H>  /  AlkPhos  88  09-03          I&O's Summary    03 Sep 2024 07:01  -  04 Sep 2024 06:59  --------------------------------------------------------  IN: 1115 mL / OUT: 2756.5 mL / NET: -1641.5 mL

## 2024-09-04 NOTE — PROGRESS NOTE ADULT - SUBJECTIVE AND OBJECTIVE BOX
Plastic Surgery Progress Note (pg [facility]: [id number], [facility]: [phone])    SUBJECTIVE  The patient was seen and examined. Recovering appropriately on floors    OBJECTIVE  ___________________________________________________  VITAL SIGNS / I&O's   Vital Signs Last 24 Hrs  T(C): 36.3 (04 Sep 2024 06:55), Max: 36.7 (04 Sep 2024 01:32)  T(F): 97.4 (04 Sep 2024 06:55), Max: 98.1 (04 Sep 2024 01:32)  HR: 97 (04 Sep 2024 06:55) (75 - 97)  BP: 108/78 (04 Sep 2024 06:55) (105/61 - 139/74)  BP(mean): 76 (03 Sep 2024 20:00) (76 - 98)  RR: 17 (04 Sep 2024 06:55) (12 - 19)  SpO2: 95% (04 Sep 2024 06:55) (94% - 100%)    Parameters below as of 04 Sep 2024 06:55  Patient On (Oxygen Delivery Method): room air          03 Sep 2024 07:01  -  04 Sep 2024 07:00  --------------------------------------------------------  IN:    Oral Fluid: 240 mL    sodium chloride 0.9%: 375 mL    Sodium Chloride 0.9% Bolus: 500 mL  Total IN: 1115 mL    OUT:    Bulb (mL): 306.5 mL    Indwelling Catheter - Urethral (mL): 2450 mL  Total OUT: 2756.5 mL    Total NET: -1641.5 mL        ___________________________________________________  PHYSICAL EXAM    -- CONSTITUTIONAL: NAD, lying in bed  -- NEURO: Awake, alert  -- PULM: Non-labored respirations  -- BACK: soft, flat, dressing without strikethrough ELIE drain ss to suction    ___________________________________________________  LABS                        11.9   14.48 )-----------( 222      ( 04 Sep 2024 05:18 )             34.1     03 Sep 2024 15:51    140    |  104    |  10     ----------------------------<  127    3.7     |  24     |  0.70     Ca    9.5        03 Sep 2024 15:51  Phos  2.8       03 Sep 2024 15:51  Mg     1.90      03 Sep 2024 15:51    TPro  7.4    /  Alb  4.2    /  TBili  0.8    /  DBili  0.3    /  AST  68     /  ALT  36     /  AlkPhos  88     03 Sep 2024 15:51      CAPILLARY BLOOD GLUCOSE      POCT Blood Glucose.: 99 mg/dL (03 Sep 2024 11:37)        Urinalysis Basic - ( 03 Sep 2024 15:51 )    Color: x / Appearance: x / SG: x / pH: x  Gluc: 127 mg/dL / Ketone: x  / Bili: x / Urobili: x   Blood: x / Protein: x / Nitrite: x   Leuk Esterase: x / RBC: x / WBC x   Sq Epi: x / Non Sq Epi: x / Bacteria: x      ___________________________________________________  MICRO  Recent Cultures:    ___________________________________________________  MEDICATIONS  (STANDING):  acetaminophen     Tablet .. 1000 milliGRAM(s) Oral every 8 hours  ceFAZolin   IVPB 2000 milliGRAM(s) IV Intermittent every 8 hours  gabapentin 100 milliGRAM(s) Oral three times a day  lactated ringers. 1000 milliLiter(s) (30 mL/Hr) IV Continuous <Continuous>  pantoprazole    Tablet 40 milliGRAM(s) Oral before breakfast  polyethylene glycol 3350 17 Gram(s) Oral daily  senna 2 Tablet(s) Oral at bedtime  sodium chloride 0.9%. 1000 milliLiter(s) (75 mL/Hr) IV Continuous <Continuous>  traMADol 25 milliGRAM(s) Oral every 8 hours    MEDICATIONS  (PRN):  bisacodyl 5 milliGRAM(s) Oral every 12 hours PRN Constipation  cyclobenzaprine 5 milliGRAM(s) Oral every 8 hours PRN Muscle Spasm  ondansetron Injectable 4 milliGRAM(s) IV Push every 6 hours PRN Nausea and/or Vomiting  oxyCODONE    IR 2.5 milliGRAM(s) Oral every 4 hours PRN Moderate Pain (4 - 6)  oxyCODONE    IR 5 milliGRAM(s) Oral every 4 hours PRN Severe Pain (7 - 10)

## 2024-09-04 NOTE — CONSULT NOTE ADULT - ASSESSMENT
78 yo F w/ HLD, colon cancer s/p partial colectomy, spinal stenosis presenting with back pain and paresthesias, no significant relief noted with NSAIDs, physical therapy, steroid injections, MRI noted spinal stenosis and neuroforaminal narrowing, admitted for orthopedic procedure, s/p L3-L5 decompression and L4-L5 fusion on 9/3, currently POD#1, doing well.     # Lumbar Spinal stenosis s/p decompression and fusion  - POD#1  - post-op care, pain management, bowel regimen to prevent opioid induced constipation as per ortho  - encourage incentive spirometry  - VTE ppx: as per ortho  - plan for removal of cortes as per ortho  - monitoring drain output as per ortho  - PT recs home w/ otpt PT, rolling walker  - on gabapentin tid (home med), hold for sedation    # HLD   - can resume statin    # hx Colon cancer  - review of otpt records, patient seen by GI 2021 for screening, per documentation, colonoscopy noted normal mucosa, 3 small polyps removed and benign  - to f/u GI as otpt as needed

## 2024-09-04 NOTE — OCCUPATIONAL THERAPY INITIAL EVALUATION ADULT - PERTINENT HX OF CURRENT PROBLEM, REHAB EVAL
Patient is a 79 year old Female status post L3-5 decompression and L4-5 fusion, Patient is a 79 year old Female status post L3-5 decompression and L4-5 fusion.

## 2024-09-04 NOTE — PROGRESS NOTE ADULT - ASSESSMENT
Patient is a 79y y/o Female s/p L3-5 decompression and L4-5 fusion    PLAN  -WBAT  -Pain control/analgesia  -Antibiotics - Ancef postop  -Incentive spirometry  -Venodynes  -F/U AM Labs  -PT/OT  -Harrison - monitor output  -Monitor ELIE output  -Dispo: home   Patient is a 79y y/o Female s/p L3-5 decompression and L4-5 fusion    PLAN  -WBAT  -Pain control/analgesia  -Antibiotics - Ancef postop  -Incentive spirometry  -Venodynes  -F/U AM Labs  -PT/OT  -Harrison - monitor output  -Monitor ELIE output  -Dispo: home      Patient seen on rounds doing well. Resolved pre-operative leg pain, incisional pain controlled. Ambulated with PT last PM. I discussed with the above, Dr. Dieter Carbajal.

## 2024-09-05 ENCOUNTER — TRANSCRIPTION ENCOUNTER (OUTPATIENT)
Age: 79
End: 2024-09-05

## 2024-09-05 LAB
ANION GAP SERPL CALC-SCNC: 9 MMOL/L — SIGNIFICANT CHANGE UP (ref 7–14)
BASOPHILS # BLD AUTO: 0.02 K/UL — SIGNIFICANT CHANGE UP (ref 0–0.2)
BASOPHILS NFR BLD AUTO: 0.2 % — SIGNIFICANT CHANGE UP (ref 0–2)
BUN SERPL-MCNC: 10 MG/DL — SIGNIFICANT CHANGE UP (ref 7–23)
CALCIUM SERPL-MCNC: 8.7 MG/DL — SIGNIFICANT CHANGE UP (ref 8.4–10.5)
CHLORIDE SERPL-SCNC: 105 MMOL/L — SIGNIFICANT CHANGE UP (ref 98–107)
CO2 SERPL-SCNC: 26 MMOL/L — SIGNIFICANT CHANGE UP (ref 22–31)
CREAT SERPL-MCNC: 0.59 MG/DL — SIGNIFICANT CHANGE UP (ref 0.5–1.3)
EGFR: 92 ML/MIN/1.73M2 — SIGNIFICANT CHANGE UP
EOSINOPHIL # BLD AUTO: 0.01 K/UL — SIGNIFICANT CHANGE UP (ref 0–0.5)
EOSINOPHIL NFR BLD AUTO: 0.1 % — SIGNIFICANT CHANGE UP (ref 0–6)
GLUCOSE SERPL-MCNC: 100 MG/DL — HIGH (ref 70–99)
HCT VFR BLD CALC: 30.3 % — LOW (ref 34.5–45)
HGB BLD-MCNC: 10 G/DL — LOW (ref 11.5–15.5)
IANC: 8.44 K/UL — HIGH (ref 1.8–7.4)
IMM GRANULOCYTES NFR BLD AUTO: 0.5 % — SIGNIFICANT CHANGE UP (ref 0–0.9)
LYMPHOCYTES # BLD AUTO: 19 % — SIGNIFICANT CHANGE UP (ref 13–44)
LYMPHOCYTES # BLD AUTO: 2.24 K/UL — SIGNIFICANT CHANGE UP (ref 1–3.3)
MCHC RBC-ENTMCNC: 30.9 PG — SIGNIFICANT CHANGE UP (ref 27–34)
MCHC RBC-ENTMCNC: 33 GM/DL — SIGNIFICANT CHANGE UP (ref 32–36)
MCV RBC AUTO: 93.5 FL — SIGNIFICANT CHANGE UP (ref 80–100)
MONOCYTES # BLD AUTO: 1.05 K/UL — HIGH (ref 0–0.9)
MONOCYTES NFR BLD AUTO: 8.9 % — SIGNIFICANT CHANGE UP (ref 2–14)
NEUTROPHILS # BLD AUTO: 8.44 K/UL — HIGH (ref 1.8–7.4)
NEUTROPHILS NFR BLD AUTO: 71.3 % — SIGNIFICANT CHANGE UP (ref 43–77)
NRBC # BLD: 0 /100 WBCS — SIGNIFICANT CHANGE UP (ref 0–0)
NRBC # FLD: 0 K/UL — SIGNIFICANT CHANGE UP (ref 0–0)
PLATELET # BLD AUTO: 182 K/UL — SIGNIFICANT CHANGE UP (ref 150–400)
POTASSIUM SERPL-MCNC: 3.9 MMOL/L — SIGNIFICANT CHANGE UP (ref 3.5–5.3)
POTASSIUM SERPL-SCNC: 3.9 MMOL/L — SIGNIFICANT CHANGE UP (ref 3.5–5.3)
RBC # BLD: 3.24 M/UL — LOW (ref 3.8–5.2)
RBC # FLD: 11.8 % — SIGNIFICANT CHANGE UP (ref 10.3–14.5)
SODIUM SERPL-SCNC: 140 MMOL/L — SIGNIFICANT CHANGE UP (ref 135–145)
WBC # BLD: 11.82 K/UL — HIGH (ref 3.8–10.5)
WBC # FLD AUTO: 11.82 K/UL — HIGH (ref 3.8–10.5)

## 2024-09-05 PROCEDURE — 99232 SBSQ HOSP IP/OBS MODERATE 35: CPT

## 2024-09-05 RX ADMIN — OXYCODONE HYDROCHLORIDE 2.5 MILLIGRAM(S): 5 TABLET ORAL at 18:20

## 2024-09-05 RX ADMIN — ACETAMINOPHEN 1000 MILLIGRAM(S): 325 TABLET ORAL at 21:27

## 2024-09-05 RX ADMIN — ACETAMINOPHEN 1000 MILLIGRAM(S): 325 TABLET ORAL at 13:18

## 2024-09-05 RX ADMIN — POLYETHYLENE GLYCOL 3350 17 GRAM(S): 17 POWDER, FOR SOLUTION ORAL at 13:17

## 2024-09-05 RX ADMIN — TRAMADOL HYDROCHLORIDE 25 MILLIGRAM(S): 200 TABLET, EXTENDED RELEASE ORAL at 06:52

## 2024-09-05 RX ADMIN — Medication 100 MILLIGRAM(S): at 05:20

## 2024-09-05 RX ADMIN — Medication 10 MILLIGRAM(S): at 21:27

## 2024-09-05 RX ADMIN — ACETAMINOPHEN 1000 MILLIGRAM(S): 325 TABLET ORAL at 14:18

## 2024-09-05 RX ADMIN — Medication 100 MILLIGRAM(S): at 21:27

## 2024-09-05 RX ADMIN — TRAMADOL HYDROCHLORIDE 25 MILLIGRAM(S): 200 TABLET, EXTENDED RELEASE ORAL at 07:42

## 2024-09-05 RX ADMIN — ACETAMINOPHEN 1000 MILLIGRAM(S): 325 TABLET ORAL at 22:27

## 2024-09-05 RX ADMIN — ACETAMINOPHEN 1000 MILLIGRAM(S): 325 TABLET ORAL at 05:21

## 2024-09-05 RX ADMIN — Medication 100 MILLIGRAM(S): at 13:24

## 2024-09-05 RX ADMIN — TRAMADOL HYDROCHLORIDE 25 MILLIGRAM(S): 200 TABLET, EXTENDED RELEASE ORAL at 23:17

## 2024-09-05 RX ADMIN — Medication 40 MILLIGRAM(S): at 05:21

## 2024-09-05 RX ADMIN — OXYCODONE HYDROCHLORIDE 2.5 MILLIGRAM(S): 5 TABLET ORAL at 19:20

## 2024-09-05 RX ADMIN — ACETAMINOPHEN 1000 MILLIGRAM(S): 325 TABLET ORAL at 06:21

## 2024-09-05 NOTE — PROGRESS NOTE ADULT - ASSESSMENT
78 yo F w/ HLD, colon cancer s/p partial colectomy, spinal stenosis presenting with back pain and paresthesias, no significant relief noted with NSAIDs, physical therapy, steroid injections, MRI noted spinal stenosis and neuroforaminal narrowing, admitted for orthopedic procedure, s/p L3-L5 decompression and L4-L5 fusion on 9/3, currently POD#2, doing well.     # Lumbar Spinal stenosis s/p decompression and fusion  - POD#2  - post-op care, pain management, bowel regimen to prevent opioid induced constipation as per ortho  - encourage incentive spirometry  - VTE ppx: as per ortho  - s/p removal of cortes, is able to urinate  - monitoring drain output as per ortho  - PT recs home w/ otpt PT, rolling walker  - on gabapentin tid (home med), hold for sedation    # HLD   - c/w statin    # hx Colon cancer  - review of otpt records, patient seen by GI 2021 for screening, per documentation, colonoscopy noted normal mucosa, 3 small polyps removed and benign  - to f/u GI as otpt as needed

## 2024-09-05 NOTE — PROGRESS NOTE ADULT - SUBJECTIVE AND OBJECTIVE BOX
[facility] Medicine  Pager [phone]    Patient is a 79y old  Female who presents with a chief complaint of s/p L3-5 decompression, L4-5 fusion (03 Sep 2024 19:25)      INTERVAL HPI/OVERNIGHT EVENTS:    MEDICATIONS  (STANDING):  acetaminophen     Tablet .. 1000 milliGRAM(s) Oral every 8 hours  atorvastatin 10 milliGRAM(s) Oral at bedtime  gabapentin 100 milliGRAM(s) Oral three times a day  pantoprazole    Tablet 40 milliGRAM(s) Oral before breakfast  polyethylene glycol 3350 17 Gram(s) Oral daily  senna 2 Tablet(s) Oral at bedtime  traMADol 25 milliGRAM(s) Oral every 8 hours    MEDICATIONS  (PRN):  bisacodyl 5 milliGRAM(s) Oral every 12 hours PRN Constipation  cyclobenzaprine 5 milliGRAM(s) Oral every 8 hours PRN Muscle Spasm  ondansetron Injectable 4 milliGRAM(s) IV Push every 6 hours PRN Nausea and/or Vomiting  oxyCODONE    IR 5 milliGRAM(s) Oral every 4 hours PRN Severe Pain (7 - 10)  oxyCODONE    IR 2.5 milliGRAM(s) Oral every 4 hours PRN Moderate Pain (4 - 6)      Allergies    No Known Allergies    Intolerances        REVIEW OF SYSTEMS:  Please see interval HPI:    Vital Signs Last 24 Hrs  T(C): 36.7 (05 Sep 2024 10:00), Max: 37.1 (04 Sep 2024 17:33)  T(F): 98.1 (05 Sep 2024 10:00), Max: 98.8 (04 Sep 2024 17:33)  HR: 70 (05 Sep 2024 10:00) (70 - 85)  BP: 121/60 (05 Sep 2024 10:00) (102/57 - 121/60)  BP(mean): --  RR: 17 (05 Sep 2024 10:00) (17 - 18)  SpO2: 97% (05 Sep 2024 10:00) (95% - 98%)    Parameters below as of 05 Sep 2024 10:00  Patient On (Oxygen Delivery Method): room air      I&O's Detail    04 Sep 2024 07:01  -  05 Sep 2024 07:00  --------------------------------------------------------  IN:  Total IN: 0 mL    OUT:    Bulb (mL): 175 mL    Indwelling Catheter - Urethral (mL): 375 mL    Voided (mL): 950 mL  Total OUT: 1500 mL    Total NET: -1500 mL            PHYSICAL EXAM:  GENERAL:   HEAD:    EYES:   ENMT:   NECK:   NERVOUS SYSTEM:    CHEST/LUNG:   HEART:   ABDOMEN:   EXTREMITIES:    LYMPH:   SKIN:     LABS:                        10.0   11.82 )-----------( 182      ( 05 Sep 2024 05:15 )             30.3     05 Sep 2024 05:15    140    |  105    |  10     ----------------------------<  100    3.9     |  26     |  0.59     Ca    8.7        05 Sep 2024 05:15        CAPILLARY BLOOD GLUCOSE        BLOOD CULTURE    RADIOLOGY & ADDITIONAL TESTS:    Imaging Personally Reviewed:  [ ] YES     Consultant(s) Notes Reviewed:      Care Discussed with Consultants/Other Providers: [facility] Medicine  Pager [phone]    Patient is a 79y old  Female who presents with a chief complaint of s/p L3-5 decompression, L4-5 fusion (03 Sep 2024 19:25)      INTERVAL HPI/OVERNIGHT EVENTS:  Sitting in chair, reports did well walking with PT. Reports only having a little pain with movement/getting out of bed or chair. No BM yet but is having flatus. No issues with urinating, able to tolerate diet. Plan to continue monitoring drain output, to coordinate return home with family once improved.     MEDICATIONS  (STANDING):  acetaminophen     Tablet .. 1000 milliGRAM(s) Oral every 8 hours  atorvastatin 10 milliGRAM(s) Oral at bedtime  gabapentin 100 milliGRAM(s) Oral three times a day  pantoprazole    Tablet 40 milliGRAM(s) Oral before breakfast  polyethylene glycol 3350 17 Gram(s) Oral daily  senna 2 Tablet(s) Oral at bedtime  traMADol 25 milliGRAM(s) Oral every 8 hours    MEDICATIONS  (PRN):  bisacodyl 5 milliGRAM(s) Oral every 12 hours PRN Constipation  cyclobenzaprine 5 milliGRAM(s) Oral every 8 hours PRN Muscle Spasm  ondansetron Injectable 4 milliGRAM(s) IV Push every 6 hours PRN Nausea and/or Vomiting  oxyCODONE    IR 5 milliGRAM(s) Oral every 4 hours PRN Severe Pain (7 - 10)  oxyCODONE    IR 2.5 milliGRAM(s) Oral every 4 hours PRN Moderate Pain (4 - 6)    Allergies  No Known Allergies    Intolerances    REVIEW OF SYSTEMS:  Please see interval HPI:    Vital Signs Last 24 Hrs  T(C): 36.7 (05 Sep 2024 10:00), Max: 37.1 (04 Sep 2024 17:33)  T(F): 98.1 (05 Sep 2024 10:00), Max: 98.8 (04 Sep 2024 17:33)  HR: 70 (05 Sep 2024 10:00) (70 - 85)  BP: 121/60 (05 Sep 2024 10:00) (102/57 - 121/60)  RR: 17 (05 Sep 2024 10:00) (17 - 18)  SpO2: 97% (05 Sep 2024 10:00) (95% - 98%)    Parameters below as of 05 Sep 2024 10:00  Patient On (Oxygen Delivery Method): room air      I&O's Detail    04 Sep 2024 07:01  -  05 Sep 2024 07:00  --------------------------------------------------------  IN:  Total IN: 0 mL    OUT:    Bulb (mL): 175 mL    Indwelling Catheter - Urethral (mL): 375 mL    Voided (mL): 950 mL  Total OUT: 1500 mL    Total NET: -1500 mL    PHYSICAL EXAM:  GEN: NAD, sitting in chair, in good spirits  HEAD: NC/AT  EYES: +glasses, R eye visual deficit  ENT: MMM, hearing intact to voice  NECK: Supple  RESPIRATORY: comfortable on RA, speaking in full sentences, no respiratory distress on RA  ABDOMEN: soft, non-tender  EXTREMITIES: no c/c/e  : cortes was removed previously  SKIN: +drain    LABS:                        10.0   11.82 )-----------( 182      ( 05 Sep 2024 05:15 )             30.3     05 Sep 2024 05:15    140    |  105    |  10     ----------------------------<  100    3.9     |  26     |  0.59     Ca    8.7        05 Sep 2024 05:15        CAPILLARY BLOOD GLUCOSE        BLOOD CULTURE    RADIOLOGY & ADDITIONAL TESTS:    Imaging Personally Reviewed:  [ ] YES     Consultant(s) Notes Reviewed:  Ortho    Care Discussed with Consultants/Other Providers: Kip Vora re: PT recs, monitoring drain output

## 2024-09-05 NOTE — DISCHARGE NOTE NURSING/CASE MANAGEMENT/SOCIAL WORK - NSDCPNINST_GEN_ALL_CORE
Maintain back incision and dressing clean dry and intact. Call MD with any signs of infection ie fever, redness, drainage, or with signs of bleeding, unrelieved increased pain, or persistent nausea. Avoid twisting motion and remember to logroll to turn in bed. Continue to drink plenty of fluids. Avoid strenuous activity and constipation which may be a side effect from taking certain medications and narcotics. No heavy lifting greater than 10 pounds, ie. a gallon of milk. Continue Spine precautions and Safety and fall prevention measures to prevent injury. Follow-up with MD in office as instructed.   You have a Post-Op appt scheduled with Dr. Carbajal on September 16th, 2024 at 10:00 AM.  Maintain back incision and dressing clean dry and intact. Call MD with any signs of infection ie fever, redness, drainage, or with signs of bleeding, unrelieved increased pain, or persistent nausea. Avoid twisting motion and remember to logroll to turn in bed. Continue to drink plenty of fluids. Avoid strenuous activity and constipation which may be a side effect from taking certain medications and narcotics. No heavy lifting greater than 10 pounds, ie. a gallon of milk. Continue Spine precautions and Safety and fall prevention measures to prevent injury. Follow-up with MD in office as instructed.

## 2024-09-05 NOTE — DISCHARGE NOTE NURSING/CASE MANAGEMENT/SOCIAL WORK - NSDCPECAREGIVERED_GEN_ALL_CORE
Medline and carenotes for surgical procedure Lami/Fusion, Spine Prcautions, incision care, pain mgt, oxy IR, Pavan, Tramadol, as well as DC Medications and side effects literature for patient reference.

## 2024-09-05 NOTE — DISCHARGE NOTE NURSING/CASE MANAGEMENT/SOCIAL WORK - NSDCPEFALRISK_GEN_ALL_CORE
For information on Fall & Injury Prevention, visit: [email or web address] OR  [email or web address] OR  https://www.cdc.gov/steadi/patient.html

## 2024-09-05 NOTE — DISCHARGE NOTE NURSING/CASE MANAGEMENT/SOCIAL WORK - PATIENT PORTAL LINK FT
You can access the FollowMyHealth Patient Portal offered by [facility] by registering at the following website: [email or web address]. By joining [facility]’s FollowMyHealth portal, you will also be able to view your health information using other applications (apps) compatible with our system.

## 2024-09-05 NOTE — PROGRESS NOTE ADULT - SUBJECTIVE AND OBJECTIVE BOX
Pt seen/examined. Doing well. Pain controlled. No acute overnight complaints or events.    T(C): 36.8 (09-05-24 @ 06:17), Max: 37.1 (09-04-24 @ 17:33)  HR: 70 (09-05-24 @ 06:17) (70 - 97)  BP: 107/74 (09-05-24 @ 06:17) (102/57 - 118/63)  RR: 17 (09-05-24 @ 06:17) (17 - 18)  SpO2: 97% (09-05-24 @ 06:17) (95% - 98%)  Wt(kg): --  - Gen: NAD    Physical Exam:  General: NAD; resting comfrotably in bed  Resp: non labored  BACK  Drain w sanguinous output  Dressing c.d.i  RUE:  Delt 5/5 Bi 5/5 Tri 5/5 Wrist ext 5/5  5/5  SILT C5-T1  2+ radial pulse    LUE:  Delt 5/5 Bi 5/5 Tri 5/5 Wrist ext 5/5  5/5  SILT C5-T1  2+ radial pulse    RLE:  IP 5/5 HS 5/5 Q 5/5 GS 5/5 TA 5/5 EHL 5/5   SILT L2-S1  2+ DP/PT pulses    LLE:  IP 5/5 HS 5/5 Q 5/5 GS 5/5 TA 5/5 EHL 5/5  SILT L2-S1  2+ DP/PT pulses    Patient is a 79y y/o Female s/p L3-5 decompression and L4-5 fusion    PLAN  -WBAT  -Pain control/analgesia  -Incentive spirometry  -Venodynes  -F/U AM Labs  -PT/OT  -Monitor ELIE output  -Dispo: home Pt seen/examined. Doing well. Pain controlled. No acute overnight complaints or events.    T(C): 36.8 (09-05-24 @ 06:17), Max: 37.1 (09-04-24 @ 17:33)  HR: 70 (09-05-24 @ 06:17) (70 - 97)  BP: 107/74 (09-05-24 @ 06:17) (102/57 - 118/63)  RR: 17 (09-05-24 @ 06:17) (17 - 18)  SpO2: 97% (09-05-24 @ 06:17) (95% - 98%)  Wt(kg): --  - Gen: NAD    Physical Exam:  General: NAD; resting comfrotably in bed  Resp: non labored  BACK  Drain w sanguinous output  Dressing c.d.i  RUE:  Delt 5/5 Bi 5/5 Tri 5/5 Wrist ext 5/5  5/5  SILT C5-T1  2+ radial pulse    LUE:  Delt 5/5 Bi 5/5 Tri 5/5 Wrist ext 5/5  5/5  SILT C5-T1  2+ radial pulse    RLE:  IP 5/5 HS 5/5 Q 5/5 GS 5/5 TA 5/5 EHL 5/5   SILT L2-S1  2+ DP/PT pulses    LLE:  IP 5/5 HS 5/5 Q 5/5 GS 5/5 TA 5/5 EHL 5/5  SILT L2-S1  2+ DP/PT pulses    Patient is a 79y y/o Female s/p L3-5 decompression and L4-5 fusion    PLAN  -WBAT  -Pain control/analgesia  -Incentive spirometry  -Venodynes  -F/U AM Labs  -PT/OT  -Monitor ELIE output  -Dispo: home    Patient is doing well.  Ambulating with physical therapy.  Her incisional pain is controlled and her preoperative leg pains are significantly improved.  Plastic surgery input appreciated.  I discussed with the above, she would like to leave tomorrow so she can be picked up by her daughter, Dr. Dieter Carbajal.

## 2024-09-06 PROCEDURE — 99232 SBSQ HOSP IP/OBS MODERATE 35: CPT

## 2024-09-06 RX ORDER — SENNA 187 MG
2 TABLET ORAL
Qty: 28 | Refills: 0
Start: 2024-09-06 | End: 2024-09-19

## 2024-09-06 RX ORDER — NALOXONE HCL 1 MG/ML
1 VIAL (ML) INJECTION
Qty: 1 | Refills: 0
Start: 2024-09-06 | End: 2024-09-06

## 2024-09-06 RX ORDER — MELOXICAM 15 MG/1
1 TABLET ORAL
Refills: 0 | DISCHARGE

## 2024-09-06 RX ORDER — CALCIUM CARBONATE 500(1250)
1 TABLET ORAL
Refills: 0 | DISCHARGE

## 2024-09-06 RX ORDER — GABAPENTIN 100 MG
1 CAPSULE ORAL
Qty: 90 | Refills: 0
Start: 2024-09-06 | End: 2024-10-05

## 2024-09-06 RX ORDER — PANTOPRAZOLE SODIUM 40 MG
1 TABLET, DELAYED RELEASE (ENTERIC COATED) ORAL
Qty: 30 | Refills: 0
Start: 2024-09-06 | End: 2024-10-05

## 2024-09-06 RX ORDER — OXYCODONE HYDROCHLORIDE 5 MG/1
1 TABLET ORAL
Qty: 42 | Refills: 0
Start: 2024-09-06 | End: 2024-09-12

## 2024-09-06 RX ORDER — DICLOFENAC POTASSIUM 25 MG/1
1 CAPSULE, LIQUID FILLED ORAL
Refills: 0 | DISCHARGE

## 2024-09-06 RX ORDER — CYCLOBENZAPRINE HCL 10 MG
1 TABLET ORAL
Qty: 21 | Refills: 0
Start: 2024-09-06 | End: 2024-09-12

## 2024-09-06 RX ORDER — TRAMADOL HYDROCHLORIDE 200 MG/1
0.5 TABLET, EXTENDED RELEASE ORAL
Qty: 9 | Refills: 0
Start: 2024-09-06 | End: 2024-09-11

## 2024-09-06 RX ADMIN — Medication 100 MILLIGRAM(S): at 14:36

## 2024-09-06 RX ADMIN — ACETAMINOPHEN 1000 MILLIGRAM(S): 325 TABLET ORAL at 05:15

## 2024-09-06 RX ADMIN — OXYCODONE HYDROCHLORIDE 5 MILLIGRAM(S): 5 TABLET ORAL at 15:36

## 2024-09-06 RX ADMIN — OXYCODONE HYDROCHLORIDE 5 MILLIGRAM(S): 5 TABLET ORAL at 14:36

## 2024-09-06 RX ADMIN — Medication 100 MILLIGRAM(S): at 05:15

## 2024-09-06 RX ADMIN — POLYETHYLENE GLYCOL 3350 17 GRAM(S): 17 POWDER, FOR SOLUTION ORAL at 11:44

## 2024-09-06 RX ADMIN — TRAMADOL HYDROCHLORIDE 25 MILLIGRAM(S): 200 TABLET, EXTENDED RELEASE ORAL at 08:17

## 2024-09-06 RX ADMIN — ACETAMINOPHEN 1000 MILLIGRAM(S): 325 TABLET ORAL at 06:15

## 2024-09-06 RX ADMIN — ACETAMINOPHEN 1000 MILLIGRAM(S): 325 TABLET ORAL at 14:36

## 2024-09-06 RX ADMIN — ACETAMINOPHEN 1000 MILLIGRAM(S): 325 TABLET ORAL at 15:36

## 2024-09-06 RX ADMIN — Medication 40 MILLIGRAM(S): at 05:15

## 2024-09-06 RX ADMIN — TRAMADOL HYDROCHLORIDE 25 MILLIGRAM(S): 200 TABLET, EXTENDED RELEASE ORAL at 07:17

## 2024-09-06 NOTE — PROGRESS NOTE ADULT - SUBJECTIVE AND OBJECTIVE BOX
SURGERY DAILY PROGRESS NOTE:       SUBJECTIVE: Patient seen and evaluated on rounds. Pt is resting comfortably in bed with no complaints. Denies back pain.     OBJECTIVE:  Vital Signs Last 24 Hrs  T(C): 36.5 (06 Sep 2024 14:41), Max: 36.9 (06 Sep 2024 06:35)  T(F): 97.7 (06 Sep 2024 14:41), Max: 98.5 (06 Sep 2024 06:35)  HR: 78 (06 Sep 2024 14:41) (78 - 83)  BP: 112/65 (06 Sep 2024 14:41) (101/62 - 136/63)  BP(mean): --  RR: 18 (06 Sep 2024 14:41) (17 - 18)  SpO2: 98% (06 Sep 2024 14:41) (95% - 100%)    Parameters below as of 06 Sep 2024 14:41  Patient On (Oxygen Delivery Method): room air      I&O's Detail    05 Sep 2024 07:01  -  06 Sep 2024 07:00  --------------------------------------------------------  IN:  Total IN: 0 mL    OUT:    Bulb (mL): 102.5 mL    Voided (mL): 1650 mL  Total OUT: 1752.5 mL    Total NET: -1752.5 mL      06 Sep 2024 07:01  -  06 Sep 2024 17:02  --------------------------------------------------------  IN:  Total IN: 0 mL    OUT:    Bulb (mL): 17 mL    Voided (mL): 600 mL  Total OUT: 617 mL    Total NET: -617 mL        Daily     Daily Weight in k.7 (06 Sep 2024 01:45)    LABS:                        10.0   11.82 )-----------( 182      ( 05 Sep 2024 05:15 )             30.3     09-05    140  |  105  |  10  ----------------------------<  100<H>  3.9   |  26  |  0.59    Ca    8.7      05 Sep 2024 05:15        Urinalysis Basic - ( 05 Sep 2024 05:15 )    Color: x / Appearance: x / SG: x / pH: x  Gluc: 100 mg/dL / Ketone: x  / Bili: x / Urobili: x   Blood: x / Protein: x / Nitrite: x   Leuk Esterase: x / RBC: x / WBC x   Sq Epi: x / Non Sq Epi: x / Bacteria: x      PHYSICAL EXAM    -- CONSTITUTIONAL: NAD, lying in bed  -- NEURO: Awake, alert  -- PULM: Non-labored respirations  -- BACK: soft, flat, dressing without strikethrough ELIE drain ss to suction, drain pulled /6 incision c/d/i no signs of infection, covered with gauze and tegaderm

## 2024-09-06 NOTE — PROGRESS NOTE ADULT - PROVIDER SPECIALTY LIST ADULT
Hospitalist
Orthopedics
Hospitalist
Plastic Surgery
Plastic Surgery
Orthopedics

## 2024-09-06 NOTE — PROGRESS NOTE ADULT - TIME BILLING
Reviewing labs/vitals/consultant recs, discussing with ortho, interviewing/examining patient, discussing plan, documentation
Reviewing vitals/consultant recs, discussing with ortho, interviewing/examining patient, discussing discharge home today, documentation.

## 2024-09-06 NOTE — PROGRESS NOTE ADULT - ASSESSMENT
Patient is a 79y y/o Female s/p L3-5 decompression and L4-5 fusion    PLAN  -WBAT  -Pain control/analgesia  -Incentive spirometry  -Venodynes  -PT/OT  -Monitor ELIE output  -Dispo: home once drain out    Patient is a 79y y/o Female s/p L3-5 decompression and L4-5 fusion    PLAN  -WBAT  -Pain control/analgesia  -Incentive spirometry  -Venodynes  -PT/OT  -Monitor ELIE output  -Dispo: home once drain out     Patient doing well, ambulating with PT, D/C drain today and DC home. Patient feels great. Discussed with the above, Dr. Dieter Carbajal.

## 2024-09-06 NOTE — PROGRESS NOTE ADULT - SUBJECTIVE AND OBJECTIVE BOX
ORTHOPAEDIC PROGRESS NOTE    SUBJECTIVE:  Pt seen and examined at bedside this am.  Doing well.  No acute events overnight.  Pain improving    OBJECTIVE:  Vital Signs Last 24 Hrs  T(C): 36.6 (06 Sep 2024 01:45), Max: 36.8 (05 Sep 2024 06:17)  T(F): 97.8 (06 Sep 2024 01:45), Max: 98.2 (05 Sep 2024 06:17)  HR: 81 (06 Sep 2024 01:45) (70 - 83)  BP: 136/63 (06 Sep 2024 01:45) (105/54 - 136/63)  BP(mean): --  RR: 18 (06 Sep 2024 01:45) (17 - 18)  SpO2: 99% (06 Sep 2024 01:45) (97% - 100%)    Parameters below as of 06 Sep 2024 01:45  Patient On (Oxygen Delivery Method): room air        Physical Exam:  General: NAD; resting comfrotably in bed  Resp: non labored  BACK  Drain w sanguinous output  Dressing c.d.i  RUE:  Delt 5/5 Bi 5/5 Tri 5/5 Wrist ext 5/5  5/5  SILT C5-T1  2+ radial pulse    LUE:  Delt 5/5 Bi 5/5 Tri 5/5 Wrist ext 5/5  5/5  SILT C5-T1  2+ radial pulse    RLE:  IP 5/5 HS 5/5 Q 5/5 GS 5/5 TA 5/5 EHL 5/5   SILT L2-S1  2+ DP/PT pulses    LLE:  IP 5/5 HS 5/5 Q 5/5 GS 5/5 TA 5/5 EHL 5/5  SILT L2-S1  2+ DP/PT pulses        LABS                        10.0   11.82 )-----------( 182      ( 05 Sep 2024 05:15 )             30.3       09-05    140  |  105  |  10  ----------------------------<  100<H>  3.9   |  26  |  0.59    Ca    8.7      05 Sep 2024 05:15            I&O's Summary    04 Sep 2024 07:01  -  05 Sep 2024 07:00  --------------------------------------------------------  IN: 0 mL / OUT: 1500 mL / NET: -1500 mL    05 Sep 2024 07:01  -  06 Sep 2024 06:15  --------------------------------------------------------  IN: 0 mL / OUT: 945 mL / NET: -945 mL

## 2024-09-06 NOTE — PROGRESS NOTE ADULT - SUBJECTIVE AND OBJECTIVE BOX
[facility] Medicine  Pager [phone]    Patient is a 79y old  Female who presents with a chief complaint of s/p L3-5 decompression, L4-5 fusion 9/3/24 (03 Sep 2024 19:25)      INTERVAL HPI/OVERNIGHT EVENTS:    MEDICATIONS  (STANDING):  acetaminophen     Tablet .. 1000 milliGRAM(s) Oral every 8 hours  atorvastatin 10 milliGRAM(s) Oral at bedtime  gabapentin 100 milliGRAM(s) Oral three times a day  pantoprazole    Tablet 40 milliGRAM(s) Oral before breakfast  polyethylene glycol 3350 17 Gram(s) Oral daily  senna 2 Tablet(s) Oral at bedtime  traMADol 25 milliGRAM(s) Oral every 8 hours    MEDICATIONS  (PRN):  bisacodyl 5 milliGRAM(s) Oral every 12 hours PRN Constipation  cyclobenzaprine 5 milliGRAM(s) Oral every 8 hours PRN Muscle Spasm  ondansetron Injectable 4 milliGRAM(s) IV Push every 6 hours PRN Nausea and/or Vomiting  oxyCODONE    IR 5 milliGRAM(s) Oral every 4 hours PRN Severe Pain (7 - 10)  oxyCODONE    IR 2.5 milliGRAM(s) Oral every 4 hours PRN Moderate Pain (4 - 6)      Allergies    No Known Allergies    Intolerances        REVIEW OF SYSTEMS:  Please see interval HPI:    Vital Signs Last 24 Hrs  T(C): 36.4 (06 Sep 2024 09:23), Max: 36.9 (06 Sep 2024 06:35)  T(F): 97.6 (06 Sep 2024 09:23), Max: 98.5 (06 Sep 2024 06:35)  HR: 82 (06 Sep 2024 09:23) (80 - 83)  BP: 101/62 (06 Sep 2024 09:23) (101/62 - 136/63)  BP(mean): --  RR: 18 (06 Sep 2024 09:23) (17 - 18)  SpO2: 95% (06 Sep 2024 09:23) (95% - 100%)    Parameters below as of 06 Sep 2024 09:23  Patient On (Oxygen Delivery Method): room air      I&O's Detail    05 Sep 2024 07:01  -  06 Sep 2024 07:00  --------------------------------------------------------  IN:  Total IN: 0 mL    OUT:    Bulb (mL): 102.5 mL    Voided (mL): 1650 mL  Total OUT: 1752.5 mL    Total NET: -1752.5 mL      06 Sep 2024 07:01  -  06 Sep 2024 10:52  --------------------------------------------------------  IN:  Total IN: 0 mL    OUT:    Bulb (mL): 17 mL    Voided (mL): 600 mL  Total OUT: 617 mL    Total NET: -617 mL            PHYSICAL EXAM:  GENERAL:   HEAD:    EYES:   ENMT:   NECK:   NERVOUS SYSTEM:    CHEST/LUNG:   HEART:   ABDOMEN:   EXTREMITIES:    LYMPH:   SKIN:     LABS:      Ca    8.7        05 Sep 2024 05:15        CAPILLARY BLOOD GLUCOSE        BLOOD CULTURE    RADIOLOGY & ADDITIONAL TESTS:    Imaging Personally Reviewed:  [ ] YES     Consultant(s) Notes Reviewed:      Care Discussed with Consultants/Other Providers: [facility] Medicine  Pager [phone]    Patient is a 79y old  Female who presents with a chief complaint of s/p L3-5 decompression, L4-5 fusion 9/3/24 (03 Sep 2024 19:25)      INTERVAL HPI/OVERNIGHT EVENTS:  Drain removed. Feels well, ready to go home. Pain controlled, able to ambulate. No acute complaints at this time.     MEDICATIONS  (STANDING):  acetaminophen     Tablet .. 1000 milliGRAM(s) Oral every 8 hours  atorvastatin 10 milliGRAM(s) Oral at bedtime  gabapentin 100 milliGRAM(s) Oral three times a day  pantoprazole    Tablet 40 milliGRAM(s) Oral before breakfast  polyethylene glycol 3350 17 Gram(s) Oral daily  senna 2 Tablet(s) Oral at bedtime  traMADol 25 milliGRAM(s) Oral every 8 hours    MEDICATIONS  (PRN):  bisacodyl 5 milliGRAM(s) Oral every 12 hours PRN Constipation  cyclobenzaprine 5 milliGRAM(s) Oral every 8 hours PRN Muscle Spasm  ondansetron Injectable 4 milliGRAM(s) IV Push every 6 hours PRN Nausea and/or Vomiting  oxyCODONE    IR 5 milliGRAM(s) Oral every 4 hours PRN Severe Pain (7 - 10)  oxyCODONE    IR 2.5 milliGRAM(s) Oral every 4 hours PRN Moderate Pain (4 - 6)      Allergies  No Known Allergies    Intolerances    REVIEW OF SYSTEMS:  Please see interval HPI:    Vital Signs Last 24 Hrs  T(C): 36.4 (06 Sep 2024 09:23), Max: 36.9 (06 Sep 2024 06:35)  T(F): 97.6 (06 Sep 2024 09:23), Max: 98.5 (06 Sep 2024 06:35)  HR: 82 (06 Sep 2024 09:23) (80 - 83)  BP: 101/62 (06 Sep 2024 09:23) (101/62 - 136/63)  RR: 18 (06 Sep 2024 09:23) (17 - 18)  SpO2: 95% (06 Sep 2024 09:23) (95% - 100%)    Parameters below as of 06 Sep 2024 09:23  Patient On (Oxygen Delivery Method): room air      I&O's Detail    05 Sep 2024 07:01  -  06 Sep 2024 07:00  --------------------------------------------------------  IN:  Total IN: 0 mL    OUT:    Bulb (mL): 102.5 mL    Voided (mL): 1650 mL  Total OUT: 1752.5 mL    Total NET: -1752.5 mL      06 Sep 2024 07:01  -  06 Sep 2024 10:52  --------------------------------------------------------  IN:  Total IN: 0 mL    OUT:    Bulb (mL): 17 mL    Voided (mL): 600 mL  Total OUT: 617 mL    Total NET: -617 mL    PHYSICAL EXAM:  GEN: NAD, lying in bed  HEAD: NC/AT  ENT: MMM, hearing intact to voice  NECK: Supple  RESPIRATORY: comfortable on RA, speaking in full sentences, no respiratory distress on RA  ABDOMEN: soft, non-tender  EXTREMITIES: no c/c/e    LABS:                        10.0   11.82 )-----------( 182      ( 05 Sep 2024 05:15 )             30.3     09-05    140  |  105  |  10  ----------------------------<  100<H>  3.9   |  26  |  0.59    Ca    8.7      05 Sep 2024 05:15    CAPILLARY BLOOD GLUCOSE    BLOOD CULTURE    RADIOLOGY & ADDITIONAL TESTS:    Imaging Personally Reviewed:  [ ] YES     Consultant(s) Notes Reviewed:  Ortho    Care Discussed with Consultants/Other Providers: Ortho Ali re: drain removed, d/c planning

## 2024-09-06 NOTE — PROGRESS NOTE ADULT - ASSESSMENT
Patient is a 79y y/o Female s/p L3-5 decompression and L4-5 fusion with bilateral muscle flap closure.    PLAN  - pain control  - abx  - dressing in place: gauze/tegederms  - ELIE removed  - remainder of care per primary aprpeciated

## 2024-09-06 NOTE — PROGRESS NOTE ADULT - ASSESSMENT
80 yo F w/ HLD, colon cancer s/p partial colectomy, spinal stenosis presenting with back pain and paresthesias, no significant relief noted with NSAIDs, physical therapy, steroid injections, MRI noted spinal stenosis and neuroforaminal narrowing, admitted for orthopedic procedure, s/p L3-L5 decompression and L4-L5 fusion on 9/3, currently POD#3, doing well, being planned for discharge.     # Lumbar Spinal stenosis s/p decompression and fusion  - POD#3  - post-op care, pain management, bowel regimen to prevent opioid induced constipation as per ortho  - encourage incentive spirometry  - VTE ppx: as per ortho  - s/p removal of cortes, is able to urinate  - s/p removal of drain as per ortho  - PT recs home w/ otpt PT, rolling walker  - on gabapentin tid (home med), hold for sedation    # HLD   - c/w statin    # hx Colon cancer  - review of otpt records, patient seen by GI 2021 for screening, per documentation, colonoscopy noted normal mucosa, 3 small polyps removed and benign  - to f/u GI as otpt as needed

## 2024-09-07 VITALS
TEMPERATURE: 97 F | SYSTOLIC BLOOD PRESSURE: 130 MMHG | OXYGEN SATURATION: 95 % | RESPIRATION RATE: 17 BRPM | HEART RATE: 66 BPM | DIASTOLIC BLOOD PRESSURE: 61 MMHG

## 2024-09-09 LAB — SURGICAL PATHOLOGY STUDY: SIGNIFICANT CHANGE UP

## 2024-09-16 ENCOUNTER — APPOINTMENT (OUTPATIENT)
Dept: ORTHOPEDIC SURGERY | Facility: CLINIC | Age: 79
End: 2024-09-16
Payer: MEDICARE

## 2024-09-16 VITALS
BODY MASS INDEX: 25.4 KG/M2 | OXYGEN SATURATION: 99 % | HEIGHT: 59 IN | HEART RATE: 108 BPM | DIASTOLIC BLOOD PRESSURE: 76 MMHG | WEIGHT: 126 LBS | SYSTOLIC BLOOD PRESSURE: 116 MMHG

## 2024-09-16 DIAGNOSIS — M48.07 SPINAL STENOSIS, LUMBOSACRAL REGION: ICD-10-CM

## 2024-09-16 DIAGNOSIS — M43.16 SPONDYLOLISTHESIS, LUMBAR REGION: ICD-10-CM

## 2024-09-16 PROCEDURE — 99024 POSTOP FOLLOW-UP VISIT: CPT

## 2024-09-16 PROCEDURE — 72100 X-RAY EXAM L-S SPINE 2/3 VWS: CPT

## 2024-09-16 NOTE — ADDENDUM
[FreeTextEntry1] : This note was written by Cristopher Campbell on 09/16/2024 acting as scribe for Dr. Dieter Carbajal M.D.  I, Dieter Carbajal MD, have read and attest that all the information, medical decision making and discharge instructions within are true and accurate.

## 2024-09-16 NOTE — PHYSICAL EXAM
[Normal] : Gait: normal [Solomon's Sign] : negative Solomon's sign [Pronator Drift] : negative pronator drift [SLR] : negative straight leg raise [de-identified] : 5 out of 5 motor strength, sensation is intact and symmetrical full range of motion flexion extension and rotation, no palpatory tenderness full range of motion of hips knees shoulders and elbows (all four extremities), no atrophy, negative straight leg raise, no pathological reflexes, no swelling, normal ambulation, no apparent distress skin is intact, no palpable lymph nodes, no upper or lower extremity instability, alert and oriented x3 and normal mood. Normal finger-to nose test. No upper motor neuron findings. Incision healing. [de-identified] : XR AP Lat Lumbar 9/16/24 -L4-5 laminectomy and fusion- reviewed with patient.

## 2024-09-16 NOTE — DISCUSSION/SUMMARY
[de-identified] : s/p L4-5 laminectomy and diskectomy on the left, L4-5 fusion on 9/3/24.  Doing well, is happy with surgical results. Discussed all options. Discussed post-op do's and don'ts. F/U 4 weeks. All options discussed including rest, medicine, home exercise, acupuncture, Chiropractic care, Physical Therapy, Pain management, and last resort surgery. All questions were answered, all alternatives discussed. and the patient is in complete agreement with the treatment plan which the patient contributed to and discussed with me through the shared decision-making process. Follow-up appointment as instructed. Any issues and the patient will call or come in sooner. Family member agrees with plan.

## 2024-09-16 NOTE — HISTORY OF PRESENT ILLNESS
[Improving] : improving [de-identified] : 79 year old female presents s/p L4-5 laminectomy and diskectomy on the left, L4-5 fusion on 9/3/24.  Pre-operative leg pain has improved.  Able to ambulate with no issues.  Doing well.  No fever chills sweats nausea vomiting no bowel or bladder dysfunction, no recent weight loss or gain no night pain. This history is in addition to the intake form that I personally reviewed.

## 2024-10-16 ENCOUNTER — APPOINTMENT (OUTPATIENT)
Dept: ORTHOPEDIC SURGERY | Facility: CLINIC | Age: 79
End: 2024-10-16
Payer: MEDICARE

## 2024-10-16 VITALS
HEIGHT: 59 IN | DIASTOLIC BLOOD PRESSURE: 71 MMHG | HEART RATE: 111 BPM | BODY MASS INDEX: 24.6 KG/M2 | OXYGEN SATURATION: 98 % | SYSTOLIC BLOOD PRESSURE: 119 MMHG | WEIGHT: 122 LBS

## 2024-10-16 DIAGNOSIS — M48.07 SPINAL STENOSIS, LUMBOSACRAL REGION: ICD-10-CM

## 2024-10-16 DIAGNOSIS — M43.16 SPONDYLOLISTHESIS, LUMBAR REGION: ICD-10-CM

## 2024-10-16 PROCEDURE — 72100 X-RAY EXAM L-S SPINE 2/3 VWS: CPT

## 2024-10-16 PROCEDURE — 99024 POSTOP FOLLOW-UP VISIT: CPT

## 2024-12-18 ENCOUNTER — NON-APPOINTMENT (OUTPATIENT)
Age: 79
End: 2024-12-18

## 2024-12-18 ENCOUNTER — APPOINTMENT (OUTPATIENT)
Dept: ORTHOPEDIC SURGERY | Facility: CLINIC | Age: 79
End: 2024-12-18
Payer: MEDICARE

## 2024-12-18 VITALS
SYSTOLIC BLOOD PRESSURE: 120 MMHG | OXYGEN SATURATION: 99 % | WEIGHT: 122 LBS | HEART RATE: 85 BPM | DIASTOLIC BLOOD PRESSURE: 72 MMHG | HEIGHT: 59 IN | BODY MASS INDEX: 24.6 KG/M2

## 2024-12-18 DIAGNOSIS — M48.02 SPINAL STENOSIS, CERVICAL REGION: ICD-10-CM

## 2024-12-18 DIAGNOSIS — M43.16 SPONDYLOLISTHESIS, LUMBAR REGION: ICD-10-CM

## 2024-12-18 DIAGNOSIS — M48.07 SPINAL STENOSIS, LUMBOSACRAL REGION: ICD-10-CM

## 2024-12-18 PROCEDURE — 72100 X-RAY EXAM L-S SPINE 2/3 VWS: CPT

## 2024-12-18 PROCEDURE — 99214 OFFICE O/P EST MOD 30 MIN: CPT

## 2024-12-18 PROCEDURE — 72040 X-RAY EXAM NECK SPINE 2-3 VW: CPT

## 2024-12-24 NOTE — ASU PATIENT PROFILE, ADULT - BRADEN SCORE (IF 18 OR LESS ACTIVATE SKIN INJURY RISK INCREASED GUIDELINE), MLM
Call to patient to confirm which dose of Mounjaro she is taking. Is she having any side effects with higher dose(s)?     Refill request received for Mounjaro 5 MG/0.5ML Solution Auto-injector     Last refill 10 mg dose 11/27/24 qty 2 ml refills 1    According to Yanelis's note on 10/23/24   - Continue current dose of Mounjaro 7.5 mg weekly for now, as she recently started this dose - may consider increasing at next visit if tolerating well, or at visit with endocrine pharmacist on 11/27/24     Labs per protocol   Component      Latest Ref Rng 12/9/2024  12:28 PM   Sodium      135 - 145 mmol/L 140    Potassium      3.4 - 5.1 mmol/L 3.7    Chloride      97 - 110 mmol/L 107    CO2      21 - 32 mmol/L 30    ANION GAP      7 - 19 mmol/L 7    Glucose      70 - 99 mg/dL 137 (H)    BUN      6 - 20 mg/dL 10    Creatinine      0.51 - 0.95 mg/dL 0.77    Glomerular Filtration Rate      >=60  >90    BUN/CREATININE RATIO      7 - 25  13    CALCIUM      8.4 - 10.2 mg/dL 9.4        Follow up appointment Yanelis 1/24/25      23

## 2025-02-04 ENCOUNTER — APPOINTMENT (OUTPATIENT)
Dept: GASTROENTEROLOGY | Facility: CLINIC | Age: 80
End: 2025-02-04
Payer: MEDICARE

## 2025-02-04 ENCOUNTER — NON-APPOINTMENT (OUTPATIENT)
Age: 80
End: 2025-02-04

## 2025-02-04 VITALS
DIASTOLIC BLOOD PRESSURE: 64 MMHG | HEART RATE: 97 BPM | BODY MASS INDEX: 25.02 KG/M2 | HEIGHT: 59 IN | SYSTOLIC BLOOD PRESSURE: 118 MMHG | TEMPERATURE: 98 F | OXYGEN SATURATION: 99 % | RESPIRATION RATE: 14 BRPM | WEIGHT: 124.13 LBS

## 2025-02-04 DIAGNOSIS — Z86.0100 PERSONAL HISTORY OF COLON POLYPS, UNSPECIFIED: ICD-10-CM

## 2025-02-04 DIAGNOSIS — Z80.0 ENCOUNTER FOR SCREENING FOR MALIGNANT NEOPLASM OF COLON: ICD-10-CM

## 2025-02-04 DIAGNOSIS — Z12.11 ENCOUNTER FOR SCREENING FOR MALIGNANT NEOPLASM OF COLON: ICD-10-CM

## 2025-02-04 DIAGNOSIS — C18.9 MALIGNANT NEOPLASM OF COLON, UNSPECIFIED: ICD-10-CM

## 2025-02-04 DIAGNOSIS — Z86.39 PERSONAL HISTORY OF OTHER ENDOCRINE, NUTRITIONAL AND METABOLIC DISEASE: ICD-10-CM

## 2025-02-04 DIAGNOSIS — Z85.038 PERSONAL HISTORY OF OTHER MALIGNANT NEOPLASM OF LARGE INTESTINE: ICD-10-CM

## 2025-02-04 DIAGNOSIS — Z80.0 FAMILY HISTORY OF MALIGNANT NEOPLASM OF DIGESTIVE ORGANS: ICD-10-CM

## 2025-02-04 PROCEDURE — 99204 OFFICE O/P NEW MOD 45 MIN: CPT

## 2025-02-04 RX ORDER — SODIUM PICOSULFATE, MAGNESIUM OXIDE, AND ANHYDROUS CITRIC ACID 12; 3.5; 1 G/175ML; G/175ML; MG/175ML
10-3.5-12 MG-GM LIQUID ORAL TWICE DAILY
Qty: 2 | Refills: 0 | Status: ACTIVE | COMMUNITY
Start: 2025-02-04 | End: 1900-01-01

## 2025-02-04 RX ORDER — ATORVASTATIN CALCIUM 10 MG/1
10 TABLET, FILM COATED ORAL
Refills: 0 | Status: ACTIVE | COMMUNITY

## 2025-02-10 RX ORDER — POLYETHYLENE GLYCOL-3350 AND ELECTROLYTES WITH FLAVOR PACK 240; 5.84; 2.98; 6.72; 22.72 G/278.26G; G/278.26G; G/278.26G; G/278.26G; G/278.26G
240 POWDER, FOR SOLUTION ORAL
Qty: 1 | Refills: 0 | Status: ACTIVE | COMMUNITY
Start: 2025-02-10 | End: 1900-01-01

## 2025-02-12 ENCOUNTER — NON-APPOINTMENT (OUTPATIENT)
Age: 80
End: 2025-02-12

## 2025-02-12 ENCOUNTER — APPOINTMENT (OUTPATIENT)
Dept: OPHTHALMOLOGY | Facility: CLINIC | Age: 80
End: 2025-02-12
Payer: MEDICARE

## 2025-02-12 PROCEDURE — 92012 INTRM OPH EXAM EST PATIENT: CPT

## 2025-02-12 PROCEDURE — 92083 EXTENDED VISUAL FIELD XM: CPT

## 2025-02-12 PROCEDURE — 92133 CPTRZD OPH DX IMG PST SGM ON: CPT

## 2025-03-20 ENCOUNTER — APPOINTMENT (OUTPATIENT)
Dept: ORTHOPEDIC SURGERY | Facility: CLINIC | Age: 80
End: 2025-03-20
Payer: MEDICARE

## 2025-03-20 VITALS — SYSTOLIC BLOOD PRESSURE: 109 MMHG | DIASTOLIC BLOOD PRESSURE: 70 MMHG | HEART RATE: 80 BPM

## 2025-03-20 VITALS — HEIGHT: 59 IN | BODY MASS INDEX: 24.6 KG/M2 | WEIGHT: 122 LBS

## 2025-03-20 DIAGNOSIS — M48.07 SPINAL STENOSIS, LUMBOSACRAL REGION: ICD-10-CM

## 2025-03-20 DIAGNOSIS — M43.16 SPONDYLOLISTHESIS, LUMBAR REGION: ICD-10-CM

## 2025-03-20 PROCEDURE — 99214 OFFICE O/P EST MOD 30 MIN: CPT

## 2025-03-20 PROCEDURE — 72100 X-RAY EXAM L-S SPINE 2/3 VWS: CPT

## 2025-04-08 ENCOUNTER — APPOINTMENT (OUTPATIENT)
Dept: GASTROENTEROLOGY | Facility: AMBULATORY SURGERY CENTER | Age: 80
End: 2025-04-08
Payer: MEDICARE

## 2025-04-08 PROCEDURE — 45378 DIAGNOSTIC COLONOSCOPY: CPT

## 2025-04-17 ENCOUNTER — NON-APPOINTMENT (OUTPATIENT)
Age: 80
End: 2025-04-17

## 2025-05-14 ENCOUNTER — NON-APPOINTMENT (OUTPATIENT)
Age: 80
End: 2025-05-14

## 2025-05-14 ENCOUNTER — APPOINTMENT (OUTPATIENT)
Dept: OPHTHALMOLOGY | Facility: CLINIC | Age: 80
End: 2025-05-14
Payer: MEDICARE

## 2025-05-14 PROCEDURE — 92136 OPHTHALMIC BIOMETRY: CPT

## 2025-05-14 PROCEDURE — 92025 CPTRIZED CORNEAL TOPOGRAPHY: CPT

## 2025-05-14 PROCEDURE — 92014 COMPRE OPH EXAM EST PT 1/>: CPT

## 2025-05-14 PROCEDURE — 92250 FUNDUS PHOTOGRAPHY W/I&R: CPT

## 2025-07-10 NOTE — H&P PST ADULT - SOURCE OF INFORMATION, PROFILE
Physical Therapy    Visit Type: initial evaluation and treatment  SUBJECTIVE  Patient in bed upon arrival. Patient agreeable to therapy.      Patient / Family Goal: return to previous functional status, maximize function and return home    Pain   RN informed on pain level.    Location: abdomen    At onset of session (out of 10): 3     OBJECTIVE     Cognitive Status   Level of Consciousness   - alert  Affect/Behavior    - cooperative and pleasant  Functional Communication   - Overall Communication Status: within functional limits    Patient Activity Tolerance: 2 to 1 activity to rest    Strength  (out of 5 unless noted, standard test position unless noted)   Comments / Details: B LE strength wfl per functional mobility       Bed Mobility  - Supine to sit: supervision  Increased time needed   Transfers  Assistive devices: gait belt, 2-wheeled walker  Description: BUE use  - Sit to stand: supervision  - Stand to sit: supervision  Increased time needed, verbal cues for hand placement    Increase in pain upon transfer to standing position.    Ambulation / Gait  - Assistive device: gait belt and 2-wheeled walker  - Distance (feet unless otherwise indicated): 180  - Assist Level: supervision  - Surface: even  - Description: decreased jese/pace and no gait deviation observed    Slow pace secondary to abdominal pain.  Overall steady.  Stair Ambulation  - Number of steps: 6;   - Assist Level: stand by assist  - Railing: right  - Devices Used: folded walker  Education and demonstration on stair negotiation with folded walker. Practiced up/down 6 steps with stand by assist provided.    Interventions     Training provided: bed mobility training, activity tolerance, functional ambulation, transfer training, stair training, use of assistive device and body mechanics  Education on plan of care.  Skilled input: Verbal instruction/cues and tactile instruction/cues  Verbal Consent: Writer verbally educated and received verbal  consent for hand placement, positioning of patient, and techniques to be performed today from patient for clothing adjustments for techniques as described above and how they are pertinent to the patient's plan of care.         Education:   - Present and ready to learn: patient  Education provided during session:  - role of PT, transfer technique, safety, fall prevention, pain management, proper body mechanics, gait training, bed mobility techniques, energy conservation and stairs training  - Results of above outlined education: Verbalizes understanding    ASSESSMENT   Patient will benefit from skilled therapy to address listed impairments and functional limitations.  Interfering components: decreased activity tolerance    Discharge needs based on today's assessment:   - Current level of function: slightly below baseline level of function   - Therapy needs at discharge: therapy 1-3 times per week   - Activities of daily living (ADLs) requiring support at discharge: bed mobility, transfers, ambulation and stairs   - Impairments that require further therapy intervention: strength, pain, activity tolerance and balance    AM-PAC  - Generalized Prior Level of Function: IND/MOD I ([facility] 22-24)       Key: MOD A=moderate assistance, IND/MOD I=independent/modified independent  - Generalized Current Level of Function     - Current Mobility Score: 18       AM-PAC Scoring Key= >21 Modified Independent; 20-21 Supervision; 18-19 Minimal assist; 13-17 Moderate assist; 9-12 Max assist; <9 Total assist       • Predicted patient presentation: Low (stable) - Patient comorbidities and complexities, as defined above, will have little effect on progress for prescribed plan of care.    PLAN (while hospitalized)  Suggestions for next session as indicated: Plan: progress transfers and ambulation, stair review, Lower Extremities strengthening  PT Frequency: 3-5 x per week      PT/OT Mobility Equipment for Discharge: Patient owns 2ww      Interventions: bed mobility, functional transfer training, energy conservation, gait training and strengthening  Agreement to plan and goals: patient agrees with goals and treatment plan        GOALS  Review Date: 7/16/2025  Long Term Goals: (to be met by time of discharge from hospital)  Sit to supine: Patient will complete sit to supine modified independent.  Supine to sit: Patient will complete supine to sit modified independent.  Sit to stand: Patient will complete sit to stand transfer with modified independent.   Stand to sit: Patient will complete stand to sit transfer with modified independent.   Ambulation (even): Patient will ambulate on even surface for 150 feet with modified independent.   Stairs: Patient will ambulate 6 steps with modified independent.   Documented in the chart in the following areas:  Assessment/Plan.       Patient at End of Session:   Location: in chair  Safety measures: alarm system in place/re-engaged and call light within reach  Handoff to: nurse        Therapy procedure time and total treatment time can be found documented on the Time Entry flowsheet   patient

## 2025-08-13 ENCOUNTER — NON-APPOINTMENT (OUTPATIENT)
Age: 80
End: 2025-08-13

## (undated) DEVICE — FOLEY TRAY 14FR 5CC LF UMETER CLOSED

## (undated) DEVICE — PACK NEURO MINOR

## (undated) DEVICE — VENODYNE/SCD SLEEVE CALF MEDIUM

## (undated) DEVICE — PREP DURAPREP 26CC

## (undated) DEVICE — ELCTR BOVIE PENCIL SMOKE EVACUATION

## (undated) DEVICE — Device

## (undated) DEVICE — DRSG XEROFORM 2 X 2"

## (undated) DEVICE — SYR ASEPTO

## (undated) DEVICE — DRAPE SURGICAL #1010

## (undated) DEVICE — SUT ETHILON 2-0 18" FS

## (undated) DEVICE — WARMING BLANKET FULL ADULT

## (undated) DEVICE — DRILL BIT MEDTRONIC 2.4MM

## (undated) DEVICE — DRILL BIT K2 MEDICAL 2.3MM

## (undated) DEVICE — SUT STRATAFIX SPIRAL MONOCRYL PLUS 3-0 19" PS-2 UNDYED

## (undated) DEVICE — SOL IRR POUR H2O 500ML

## (undated) DEVICE — GLV 7.5 PROTEXIS (BLUE)

## (undated) DEVICE — SUT VICRYL PLUS 2-0 27" FS-1 UNDYED

## (undated) DEVICE — SOL IRR POUR NS 0.9% 1500ML

## (undated) DEVICE — GLV 7.5 PROTEXIS (WHITE)

## (undated) DEVICE — SOL IRR BAG NS 0.9% 1000ML

## (undated) DEVICE — DRAPE 3/4 SHEET 52X76"

## (undated) DEVICE — ELCTR SUBDERMAL NDL 27G X 1/2" WITH TWISTED PAIR

## (undated) DEVICE — SUT VICRYL 2-0 27" FS-1 UNDYED

## (undated) DEVICE — SPONGE DISSECTOR PEANUT

## (undated) DEVICE — DRSG STERISTRIPS 0.5 X 4"

## (undated) DEVICE — DRSG DERMABOND PRINEO 42CM

## (undated) DEVICE — DRAPE BACK TABLE COVER 44X90"

## (undated) DEVICE — SUT VICRYL 1 36" CT-1 UNDYED

## (undated) DEVICE — SOL IRR POUR H2O 1500ML

## (undated) DEVICE — NDL HYPO SAFE 21G X 1.5" (GREEN)

## (undated) DEVICE — ELCTR PEDICLE SCREW PROBE 3MM BALL 1.8MM X 100MM

## (undated) DEVICE — DRAIN RESERVOIR FOR JACKSON PRATT 100CC CARDINAL

## (undated) DEVICE — GOWN XL

## (undated) DEVICE — LIJ-KMEDIC KERRISON RONGUER: Type: DURABLE MEDICAL EQUIPMENT

## (undated) DEVICE — DRAPE TOWEL WHITE 17" X 27"

## (undated) DEVICE — DRAIN JACKSON PRATT 3 SPRING RESERVOIR W 10FR PVC DRAIN

## (undated) DEVICE — TAPE SILK 3"

## (undated) DEVICE — DRAPE TOWEL BLUE 17" X 24"

## (undated) DEVICE — BIPOLAR FORCEP STRYKER STANDARD 9" X 1MM (YELLOW)

## (undated) DEVICE — DRSG TEGADERM 1.75X1.75"

## (undated) DEVICE — MIDAS REX MR7 LUBRICANT DIFFUSER CARTRIDGE

## (undated) DEVICE — DRAPE COVER SNAP 36X30"

## (undated) DEVICE — POSITIONER HEAD REST PRONE

## (undated) DEVICE — POSITIONER PATIENT SAFETY STRAP 3X60"

## (undated) DEVICE — SUT STRATAFIX SPIRAL PDS PLUS 1 18" OS-8

## (undated) DEVICE — PROTECTOR HEEL / ELBOW FLUFFY

## (undated) DEVICE — SPONGE X-RAY 4X8"

## (undated) DEVICE — POSITIONER STRAP ARMBOARD VELCRO TS-30

## (undated) DEVICE — MIDAS REX LEGEND BALL FLUTED SM BORE 4.0MM X 10CM

## (undated) DEVICE — ELCTR 4-DISC 20MM 49" (RED, BLUE, GREEN, BLACK)

## (undated) DEVICE — TUBING CODMAN INTEGRATED BIPOLAR CORD & TUBING SET FLYING LEADS

## (undated) DEVICE — STRYKER BONE MILL BLADE FINE 3.2MM

## (undated) DEVICE — STRYKER BONE MILL & MEDIUM BLADE

## (undated) DEVICE — DRAPE INSTRUMENT POUCH 6.75" X 11"

## (undated) DEVICE — SUT MONOCRYL 3-0 27" PS-2 UNDYED

## (undated) DEVICE — ELCTR GROUNDING PAD ADULT COVIDIEN

## (undated) DEVICE — SUT ETHILON 3-0 18" FS-1

## (undated) DEVICE — DRSG BENZOIN 0.6CC

## (undated) DEVICE — POSITIONER BLUE BOLSTER

## (undated) DEVICE — STAPLER SKIN MULTI DIRECTION W35

## (undated) DEVICE — DRSG CURITY GAUZE SPONGE 4 X 4" 12-PLY

## (undated) DEVICE — CANISTER DISPOSABLE THIN WALL 3000CC

## (undated) DEVICE — DRAPE LAPAROTOMY W VELCRO CORD TABS

## (undated) DEVICE — GLV 7.5 PROTEXIS (CREAM) MICRO

## (undated) DEVICE — SUT PDS II 0 36" CT-1

## (undated) DEVICE — DRSG TELFA 3 X 8

## (undated) DEVICE — GLV 8 PROTEXIS (WHITE)

## (undated) DEVICE — DRSG TEGADERM 4X4.75"

## (undated) DEVICE — POSITIONER CUSHION INSERT PRONE VIEW LG

## (undated) DEVICE — FOLEY TRAY 16FR 5CC LF UMETER CLOSED

## (undated) DEVICE — NDL SPINAL 18G X 3.5" (PINK)

## (undated) DEVICE — GLV 8 PROTEXIS (BLUE)

## (undated) DEVICE — ELCTR BOVIE PENCIL BLADE 10FT

## (undated) DEVICE — CERVICAL COLLAR ZIMMER PHILA SM

## (undated) DEVICE — BLADE SCALPEL SAFETYLOCK #10

## (undated) DEVICE — SUT SILK 2-0 18" FS

## (undated) DEVICE — ELCTR SUBDERMAL NDL CLASSIC 1.5M X 59" (6 COLOR)

## (undated) DEVICE — SUT VICRYL PLUS 0 27" OS-6 UNDYED

## (undated) DEVICE — GOWN SMARTGOWN RAGLAN XLG

## (undated) DEVICE — MIDAS REX LEGEND BALL FLUTED SM BORE 3.0MM X 10CM

## (undated) DEVICE — MIDAS REX LEGEND LUBRICANT DIFFUSER CARTRIDGE

## (undated) DEVICE — DRAIN BLAKE 15FR BARD CHANNEL

## (undated) DEVICE — LABELS BLANK W PEN

## (undated) DEVICE — GLV 7 PROTEXIS (WHITE)

## (undated) DEVICE — SUT VICRYL 0 27" OS-6 UNDYED

## (undated) DEVICE — MIDAS REX MR8 BALL FLUTED SM BORE 4MM X 10CM

## (undated) DEVICE — POSITIONER FOAM EGG CRATE ULNAR 2PCS (PINK)

## (undated) DEVICE — FOLEY CATH 2-WAY 16FR 5CC SILICONE

## (undated) DEVICE — DRAPE MINOR PROCEDURE

## (undated) DEVICE — SUT VICRYL 1 36" CTX UNDYED